# Patient Record
Sex: MALE | Race: WHITE | Employment: OTHER | URBAN - METROPOLITAN AREA
[De-identification: names, ages, dates, MRNs, and addresses within clinical notes are randomized per-mention and may not be internally consistent; named-entity substitution may affect disease eponyms.]

---

## 2017-01-05 ENCOUNTER — HOSPITAL ENCOUNTER (OUTPATIENT)
Dept: GENERAL RADIOLOGY | Age: 77
Discharge: HOME | End: 2017-01-05
Attending: INTERNAL MEDICINE | Admitting: INTERNAL MEDICINE

## 2017-01-05 ENCOUNTER — TELEPHONE (OUTPATIENT)
Dept: INTERNAL MEDICINE CLINIC | Age: 77
End: 2017-01-05

## 2017-01-05 DIAGNOSIS — S69.91XA INJURY OF RIGHT THUMB, INITIAL ENCOUNTER: Primary | ICD-10-CM

## 2017-01-05 DIAGNOSIS — S69.91XA INJURY OF RIGHT THUMB, INITIAL ENCOUNTER: ICD-10-CM

## 2017-01-06 ENCOUNTER — NURSE ONLY (OUTPATIENT)
Dept: INTERNAL MEDICINE CLINIC | Age: 77
End: 2017-01-06

## 2017-01-06 DIAGNOSIS — I48.20 CHRONIC ATRIAL FIBRILLATION (HCC): ICD-10-CM

## 2017-01-06 DIAGNOSIS — Z79.01 LONG TERM CURRENT USE OF ANTICOAGULANT THERAPY: ICD-10-CM

## 2017-01-06 LAB
INTERNATIONAL NORMALIZATION RATIO, POC: 2.3
PROTHROMBIN TIME, POC: NORMAL

## 2017-01-06 PROCEDURE — 85610 PROTHROMBIN TIME: CPT | Performed by: INTERNAL MEDICINE

## 2017-01-16 ENCOUNTER — TELEPHONE (OUTPATIENT)
Dept: INTERNAL MEDICINE CLINIC | Age: 77
End: 2017-01-16

## 2017-01-16 DIAGNOSIS — R25.1 TREMOR: Primary | ICD-10-CM

## 2017-01-23 ENCOUNTER — OFFICE VISIT (OUTPATIENT)
Dept: CARDIOLOGY CLINIC | Age: 77
End: 2017-01-23

## 2017-01-23 VITALS
BODY MASS INDEX: 32.35 KG/M2 | DIASTOLIC BLOOD PRESSURE: 78 MMHG | HEART RATE: 68 BPM | HEIGHT: 70 IN | SYSTOLIC BLOOD PRESSURE: 128 MMHG | WEIGHT: 226 LBS

## 2017-01-23 DIAGNOSIS — Z95.0 CARDIAC PACEMAKER IN SITU: ICD-10-CM

## 2017-01-23 DIAGNOSIS — E78.5 DYSLIPIDEMIA: Primary | ICD-10-CM

## 2017-01-23 PROCEDURE — 93279 PRGRMG DEV EVAL PM/LDLS PM: CPT | Performed by: INTERNAL MEDICINE

## 2017-01-23 PROCEDURE — 99213 OFFICE O/P EST LOW 20 MIN: CPT | Performed by: INTERNAL MEDICINE

## 2017-01-23 RX ORDER — EZETIMIBE 10 MG/1
10 TABLET ORAL DAILY
Qty: 30 TABLET | Refills: 11 | Status: SHIPPED | OUTPATIENT
Start: 2017-01-23 | End: 2017-11-08

## 2017-02-03 ENCOUNTER — NURSE ONLY (OUTPATIENT)
Dept: INTERNAL MEDICINE CLINIC | Age: 77
End: 2017-02-03

## 2017-02-03 VITALS — HEART RATE: 64 BPM | RESPIRATION RATE: 12 BRPM | DIASTOLIC BLOOD PRESSURE: 70 MMHG | SYSTOLIC BLOOD PRESSURE: 124 MMHG

## 2017-02-03 DIAGNOSIS — I48.20 CHRONIC ATRIAL FIBRILLATION (HCC): ICD-10-CM

## 2017-02-03 DIAGNOSIS — Z79.01 LONG TERM CURRENT USE OF ANTICOAGULANT THERAPY: ICD-10-CM

## 2017-02-03 LAB
INTERNATIONAL NORMALIZATION RATIO, POC: 2.3
PROTHROMBIN TIME, POC: NORMAL

## 2017-02-03 PROCEDURE — 85610 PROTHROMBIN TIME: CPT | Performed by: INTERNAL MEDICINE

## 2017-03-03 ENCOUNTER — NURSE ONLY (OUTPATIENT)
Dept: INTERNAL MEDICINE CLINIC | Age: 77
End: 2017-03-03

## 2017-03-03 DIAGNOSIS — Z79.01 LONG TERM CURRENT USE OF ANTICOAGULANT THERAPY: ICD-10-CM

## 2017-03-03 DIAGNOSIS — I48.20 CHRONIC ATRIAL FIBRILLATION (HCC): ICD-10-CM

## 2017-03-03 LAB
INTERNATIONAL NORMALIZATION RATIO, POC: 1.9
PROTHROMBIN TIME, POC: NORMAL

## 2017-03-03 PROCEDURE — 85610 PROTHROMBIN TIME: CPT | Performed by: INTERNAL MEDICINE

## 2017-03-13 RX ORDER — NIZATIDINE 150 MG/1
CAPSULE ORAL
Qty: 90 CAPSULE | Refills: 3 | Status: SHIPPED | OUTPATIENT
Start: 2017-03-13 | End: 2018-02-28 | Stop reason: SDUPTHER

## 2017-03-17 ENCOUNTER — NURSE ONLY (OUTPATIENT)
Dept: INTERNAL MEDICINE CLINIC | Age: 77
End: 2017-03-17

## 2017-03-17 VITALS — DIASTOLIC BLOOD PRESSURE: 64 MMHG | SYSTOLIC BLOOD PRESSURE: 122 MMHG

## 2017-03-17 DIAGNOSIS — I48.20 CHRONIC ATRIAL FIBRILLATION (HCC): ICD-10-CM

## 2017-03-17 DIAGNOSIS — Z79.01 LONG TERM CURRENT USE OF ANTICOAGULANT THERAPY: ICD-10-CM

## 2017-03-17 LAB
INTERNATIONAL NORMALIZATION RATIO, POC: 2.1
PROTHROMBIN TIME, POC: NORMAL

## 2017-03-17 PROCEDURE — 85610 PROTHROMBIN TIME: CPT | Performed by: INTERNAL MEDICINE

## 2017-03-22 ENCOUNTER — TELEPHONE (OUTPATIENT)
Dept: INTERNAL MEDICINE CLINIC | Age: 77
End: 2017-03-22

## 2017-03-23 ENCOUNTER — OFFICE VISIT (OUTPATIENT)
Dept: INTERNAL MEDICINE CLINIC | Age: 77
End: 2017-03-23

## 2017-03-23 VITALS
SYSTOLIC BLOOD PRESSURE: 120 MMHG | HEART RATE: 80 BPM | WEIGHT: 219 LBS | DIASTOLIC BLOOD PRESSURE: 70 MMHG | BODY MASS INDEX: 31.42 KG/M2

## 2017-03-23 DIAGNOSIS — I73.9 CLAUDICATION (HCC): ICD-10-CM

## 2017-03-23 DIAGNOSIS — I73.9 PERIPHERAL VASCULAR DISEASE (HCC): Primary | ICD-10-CM

## 2017-03-23 DIAGNOSIS — R73.02 GLUCOSE INTOLERANCE (IMPAIRED GLUCOSE TOLERANCE): ICD-10-CM

## 2017-03-23 DIAGNOSIS — E78.2 MIXED HYPERLIPIDEMIA: ICD-10-CM

## 2017-03-23 DIAGNOSIS — I10 ESSENTIAL HYPERTENSION: ICD-10-CM

## 2017-03-23 PROCEDURE — 99213 OFFICE O/P EST LOW 20 MIN: CPT | Performed by: INTERNAL MEDICINE

## 2017-03-28 ENCOUNTER — HOSPITAL ENCOUNTER (OUTPATIENT)
Dept: GENERAL RADIOLOGY | Age: 77
Discharge: HOME | End: 2017-03-28
Attending: INTERNAL MEDICINE | Admitting: INTERNAL MEDICINE

## 2017-03-28 LAB
ALBUMIN SERPL-MCNC: 4.6 GM/DL (ref 3.4–5)
ALP BLD-CCNC: 106 IU/L (ref 40–129)
ALT SERPL-CCNC: 14 U/L (ref 10–40)
ANION GAP SERPL CALCULATED.3IONS-SCNC: 13 MMOL/L (ref 4–16)
AST SERPL-CCNC: 15 IU/L (ref 15–37)
BILIRUB SERPL-MCNC: 0.6 MG/DL (ref 0–1)
BILIRUBIN DIRECT: 0.2 MG/DL (ref 0–0.3)
BILIRUBIN, INDIRECT: 0.4 MG/DL (ref 0–0.7)
BUN BLDV-MCNC: 19 MG/DL (ref 6–23)
CALCIUM SERPL-MCNC: 9.2 MG/DL (ref 8.3–10.6)
CHLORIDE BLD-SCNC: 103 MMOL/L (ref 99–110)
CHOLESTEROL: 215 MG/DL
CO2: 25 MMOL/L (ref 21–32)
CREAT SERPL-MCNC: 1.2 MG/DL (ref 0.9–1.3)
ESTIMATED AVERAGE GLUCOSE: 128 MG/DL
GFR AFRICAN AMERICAN: >60 ML/MIN/1.73M2
GFR NON-AFRICAN AMERICAN: 59 ML/MIN/1.73M2
GLUCOSE BLD-MCNC: 107 MG/DL (ref 70–140)
HBA1C MFR BLD: 6.1 % (ref 4.2–6.3)
HCT VFR BLD CALC: 49.6 % (ref 42–52)
HDLC SERPL-MCNC: 64 MG/DL
HEMOGLOBIN: 16 GM/DL (ref 13.5–18)
LDL CHOLESTEROL CALCULATED: 122 MG/DL
MCH RBC QN AUTO: 30.8 PG (ref 27–31)
MCHC RBC AUTO-ENTMCNC: 32.3 % (ref 32–36)
MCV RBC AUTO: 95.6 FL (ref 78–100)
PDW BLD-RTO: 14.1 % (ref 11.7–14.9)
PLATELET # BLD: 219 K/CU MM (ref 140–440)
PMV BLD AUTO: 10.9 FL (ref 7.5–11.1)
POTASSIUM SERPL-SCNC: 4.4 MMOL/L (ref 3.5–5.1)
RBC # BLD: 5.19 M/CU MM (ref 4.6–6.2)
SODIUM BLD-SCNC: 141 MMOL/L (ref 135–145)
TOTAL CK: 57 IU/L (ref 38–174)
TOTAL PROTEIN: 7.2 GM/DL (ref 6.4–8.2)
TRIGL SERPL-MCNC: 143 MG/DL
WBC # BLD: 8.9 K/CU MM (ref 4–10.5)

## 2017-04-05 ENCOUNTER — TELEPHONE (OUTPATIENT)
Dept: INTERNAL MEDICINE CLINIC | Age: 77
End: 2017-04-05

## 2017-04-06 ENCOUNTER — OFFICE VISIT (OUTPATIENT)
Dept: INTERNAL MEDICINE CLINIC | Age: 77
End: 2017-04-06

## 2017-04-06 VITALS
SYSTOLIC BLOOD PRESSURE: 120 MMHG | HEART RATE: 86 BPM | OXYGEN SATURATION: 93 % | RESPIRATION RATE: 20 BRPM | WEIGHT: 219.6 LBS | BODY MASS INDEX: 31.51 KG/M2 | DIASTOLIC BLOOD PRESSURE: 62 MMHG

## 2017-04-06 DIAGNOSIS — E78.2 MIXED HYPERLIPIDEMIA: ICD-10-CM

## 2017-04-06 DIAGNOSIS — I48.20 CHRONIC ATRIAL FIBRILLATION (HCC): ICD-10-CM

## 2017-04-06 DIAGNOSIS — R73.02 GLUCOSE INTOLERANCE (IMPAIRED GLUCOSE TOLERANCE): ICD-10-CM

## 2017-04-06 DIAGNOSIS — I10 ESSENTIAL HYPERTENSION: ICD-10-CM

## 2017-04-06 DIAGNOSIS — G20 PARKINSON DISEASE (HCC): Primary | ICD-10-CM

## 2017-04-06 PROCEDURE — 99213 OFFICE O/P EST LOW 20 MIN: CPT | Performed by: INTERNAL MEDICINE

## 2017-04-13 ENCOUNTER — NURSE ONLY (OUTPATIENT)
Dept: INTERNAL MEDICINE CLINIC | Age: 77
End: 2017-04-13

## 2017-04-13 DIAGNOSIS — Z79.01 LONG TERM CURRENT USE OF ANTICOAGULANT THERAPY: ICD-10-CM

## 2017-04-13 DIAGNOSIS — I48.20 CHRONIC ATRIAL FIBRILLATION (HCC): ICD-10-CM

## 2017-04-13 LAB
INTERNATIONAL NORMALIZATION RATIO, POC: 2.2
PROTHROMBIN TIME, POC: 26.6

## 2017-04-13 PROCEDURE — 85610 PROTHROMBIN TIME: CPT | Performed by: INTERNAL MEDICINE

## 2017-05-09 ENCOUNTER — TELEPHONE (OUTPATIENT)
Dept: INTERNAL MEDICINE CLINIC | Age: 77
End: 2017-05-09

## 2017-05-19 ENCOUNTER — TELEPHONE (OUTPATIENT)
Dept: INTERNAL MEDICINE CLINIC | Age: 77
End: 2017-05-19

## 2017-05-19 DIAGNOSIS — R73.02 GLUCOSE INTOLERANCE (IMPAIRED GLUCOSE TOLERANCE): ICD-10-CM

## 2017-05-19 DIAGNOSIS — Z79.899 ENCOUNTER FOR LONG-TERM (CURRENT) USE OF MEDICATIONS: ICD-10-CM

## 2017-05-19 DIAGNOSIS — I25.10 ASHD (ARTERIOSCLEROTIC HEART DISEASE): Primary | ICD-10-CM

## 2017-05-19 DIAGNOSIS — J43.9 PULMONARY EMPHYSEMA, UNSPECIFIED EMPHYSEMA TYPE (HCC): ICD-10-CM

## 2017-05-19 DIAGNOSIS — I48.0 PAROXYSMAL ATRIAL FIBRILLATION (HCC): ICD-10-CM

## 2017-05-19 DIAGNOSIS — I10 ESSENTIAL HYPERTENSION: ICD-10-CM

## 2017-05-19 DIAGNOSIS — Z51.81 ENCOUNTER FOR THERAPEUTIC DRUG MONITORING: ICD-10-CM

## 2017-05-19 DIAGNOSIS — E78.2 MIXED HYPERLIPIDEMIA: ICD-10-CM

## 2017-05-23 ENCOUNTER — HOSPITAL ENCOUNTER (OUTPATIENT)
Dept: GENERAL RADIOLOGY | Age: 77
Discharge: HOME | End: 2017-05-23
Attending: INTERNAL MEDICINE | Admitting: INTERNAL MEDICINE

## 2017-05-23 LAB
ALBUMIN SERPL-MCNC: 4.5 GM/DL (ref 3.4–5)
ALP BLD-CCNC: 114 IU/L (ref 40–129)
ALT SERPL-CCNC: 11 U/L (ref 10–40)
ANION GAP SERPL CALCULATED.3IONS-SCNC: 15 MMOL/L (ref 4–16)
AST SERPL-CCNC: 15 IU/L (ref 15–37)
BASOPHILS ABSOLUTE: 0.1 K/CU MM
BASOPHILS RELATIVE PERCENT: 0.6 % (ref 0–1)
BILIRUB SERPL-MCNC: 0.6 MG/DL (ref 0–1)
BILIRUBIN DIRECT: 0.2 MG/DL (ref 0–0.3)
BILIRUBIN, INDIRECT: 0.4 MG/DL (ref 0–0.7)
BUN BLDV-MCNC: 19 MG/DL (ref 6–23)
CALCIUM SERPL-MCNC: 9.3 MG/DL (ref 8.3–10.6)
CHLORIDE BLD-SCNC: 106 MMOL/L (ref 99–110)
CHOLESTEROL: 211 MG/DL
CO2: 24 MMOL/L (ref 21–32)
CREAT SERPL-MCNC: 1.1 MG/DL (ref 0.9–1.3)
DIFFERENTIAL TYPE: ABNORMAL
EOSINOPHILS ABSOLUTE: 0.1 K/CU MM
EOSINOPHILS RELATIVE PERCENT: 1.3 % (ref 0–3)
GFR AFRICAN AMERICAN: >60 ML/MIN/1.73M2
GFR NON-AFRICAN AMERICAN: >60 ML/MIN/1.73M2
GLUCOSE BLD-MCNC: 106 MG/DL (ref 70–140)
HCT VFR BLD CALC: 46.6 % (ref 42–52)
HDLC SERPL-MCNC: 58 MG/DL
HEMOGLOBIN: 15.1 GM/DL (ref 13.5–18)
IMMATURE NEUTROPHIL %: 0.3 % (ref 0–0.43)
INR BLD: 1.93 INDEX
LDL CHOLESTEROL DIRECT: 141 MG/DL
LYMPHOCYTES ABSOLUTE: 1.6 K/CU MM
LYMPHOCYTES RELATIVE PERCENT: 18.2 % (ref 24–44)
MCH RBC QN AUTO: 31.6 PG (ref 27–31)
MCHC RBC AUTO-ENTMCNC: 32.4 % (ref 32–36)
MCV RBC AUTO: 97.5 FL (ref 78–100)
MONOCYTES ABSOLUTE: 0.8 K/CU MM
MONOCYTES RELATIVE PERCENT: 8.8 % (ref 0–4)
NUCLEATED RBC %: 0 %
PDW BLD-RTO: 15.2 % (ref 11.7–14.9)
PLATELET # BLD: 251 K/CU MM (ref 140–440)
PMV BLD AUTO: 10.6 FL (ref 7.5–11.1)
POTASSIUM SERPL-SCNC: 5.1 MMOL/L (ref 3.5–5.1)
PROTHROMBIN TIME: 22.4 SECONDS (ref 9.12–12.5)
RBC # BLD: 4.78 M/CU MM (ref 4.6–6.2)
SEGMENTED NEUTROPHILS ABSOLUTE COUNT: 6.2 K/CU MM
SEGMENTED NEUTROPHILS RELATIVE PERCENT: 70.8 % (ref 36–66)
SODIUM BLD-SCNC: 145 MMOL/L (ref 135–145)
TOTAL CK: 58 IU/L (ref 38–174)
TOTAL IMMATURE NEUTOROPHIL: 0.03 K/CU MM
TOTAL NUCLEATED RBC: 0 K/CU MM
TOTAL PROTEIN: 7 GM/DL (ref 6.4–8.2)
TRIGL SERPL-MCNC: 112 MG/DL
TSH HIGH SENSITIVITY: 3.29 UIU/ML (ref 0.27–4.2)
WBC # BLD: 8.7 K/CU MM (ref 4–10.5)

## 2017-05-26 LAB
5 HIAA URINE (PER GM CREAT): 6 MG/GCR
5-HIAA 24 HOUR URINE: NORMAL
5-HIAA INTERPRETATION: NORMAL
5-HIAA URINE: 6.5
CREATININE URINE: 113
CREATININE, 24H UR: NORMAL
TIME URINE: NORMAL
VOLUME, (UVOL): NORMAL

## 2017-05-31 RX ORDER — CLOPIDOGREL BISULFATE 75 MG/1
75 TABLET ORAL DAILY
Qty: 90 TABLET | Refills: 3 | Status: ON HOLD | OUTPATIENT
Start: 2017-05-31 | End: 2017-11-17 | Stop reason: HOSPADM

## 2017-06-05 ENCOUNTER — TELEPHONE (OUTPATIENT)
Dept: INTERNAL MEDICINE CLINIC | Age: 77
End: 2017-06-05

## 2017-06-06 ENCOUNTER — OFFICE VISIT (OUTPATIENT)
Dept: INTERNAL MEDICINE CLINIC | Age: 77
End: 2017-06-06

## 2017-06-06 VITALS
WEIGHT: 216 LBS | HEART RATE: 68 BPM | BODY MASS INDEX: 30.99 KG/M2 | RESPIRATION RATE: 12 BRPM | DIASTOLIC BLOOD PRESSURE: 74 MMHG | SYSTOLIC BLOOD PRESSURE: 118 MMHG

## 2017-06-06 DIAGNOSIS — E78.2 MIXED HYPERLIPIDEMIA: ICD-10-CM

## 2017-06-06 DIAGNOSIS — I10 ESSENTIAL HYPERTENSION: ICD-10-CM

## 2017-06-06 DIAGNOSIS — J44.9 COPD, SEVERE (HCC): ICD-10-CM

## 2017-06-06 DIAGNOSIS — I25.10 ASHD (ARTERIOSCLEROTIC HEART DISEASE): Primary | ICD-10-CM

## 2017-06-06 DIAGNOSIS — F17.200 SMOKING: ICD-10-CM

## 2017-06-06 PROCEDURE — 99213 OFFICE O/P EST LOW 20 MIN: CPT | Performed by: INTERNAL MEDICINE

## 2017-06-06 ASSESSMENT — PATIENT HEALTH QUESTIONNAIRE - PHQ9
1. LITTLE INTEREST OR PLEASURE IN DOING THINGS: 0
2. FEELING DOWN, DEPRESSED OR HOPELESS: 0
SUM OF ALL RESPONSES TO PHQ9 QUESTIONS 1 & 2: 0
SUM OF ALL RESPONSES TO PHQ QUESTIONS 1-9: 0

## 2017-06-26 ENCOUNTER — NURSE ONLY (OUTPATIENT)
Dept: INTERNAL MEDICINE CLINIC | Age: 77
End: 2017-06-26

## 2017-06-26 DIAGNOSIS — Z79.01 LONG TERM CURRENT USE OF ANTICOAGULANT THERAPY: Primary | ICD-10-CM

## 2017-06-26 DIAGNOSIS — I48.20 CHRONIC ATRIAL FIBRILLATION (HCC): ICD-10-CM

## 2017-06-26 LAB
INTERNATIONAL NORMALIZATION RATIO, POC: 2.2
PROTHROMBIN TIME, POC: NORMAL

## 2017-06-26 PROCEDURE — 85610 PROTHROMBIN TIME: CPT | Performed by: INTERNAL MEDICINE

## 2017-06-30 RX ORDER — WARFARIN SODIUM 2 MG/1
TABLET ORAL
Qty: 220 TABLET | Refills: 3 | Status: SHIPPED | OUTPATIENT
Start: 2017-06-30 | End: 2019-12-05 | Stop reason: SDUPTHER

## 2017-07-10 RX ORDER — AMLODIPINE BESYLATE 10 MG/1
TABLET ORAL
Qty: 90 TABLET | Refills: 3 | Status: SHIPPED | OUTPATIENT
Start: 2017-07-10 | End: 2018-06-18 | Stop reason: SDUPTHER

## 2017-07-19 ENCOUNTER — NURSE ONLY (OUTPATIENT)
Dept: CARDIOLOGY CLINIC | Age: 77
End: 2017-07-19

## 2017-07-19 VITALS — SYSTOLIC BLOOD PRESSURE: 136 MMHG | DIASTOLIC BLOOD PRESSURE: 86 MMHG | HEART RATE: 62 BPM

## 2017-07-19 DIAGNOSIS — Z95.0 CARDIAC PACEMAKER IN SITU: Primary | ICD-10-CM

## 2017-07-19 PROCEDURE — 93279 PRGRMG DEV EVAL PM/LDLS PM: CPT | Performed by: INTERNAL MEDICINE

## 2017-07-24 ENCOUNTER — NURSE ONLY (OUTPATIENT)
Dept: INTERNAL MEDICINE CLINIC | Age: 77
End: 2017-07-24

## 2017-07-24 VITALS — SYSTOLIC BLOOD PRESSURE: 130 MMHG | DIASTOLIC BLOOD PRESSURE: 70 MMHG | RESPIRATION RATE: 14 BRPM | HEART RATE: 68 BPM

## 2017-07-24 DIAGNOSIS — Z79.01 LONG TERM CURRENT USE OF ANTICOAGULANT THERAPY: ICD-10-CM

## 2017-07-24 DIAGNOSIS — J44.1 COPD EXACERBATION (HCC): ICD-10-CM

## 2017-07-24 DIAGNOSIS — I48.20 CHRONIC ATRIAL FIBRILLATION (HCC): ICD-10-CM

## 2017-07-24 DIAGNOSIS — R42 DIZZINESS: ICD-10-CM

## 2017-07-24 LAB
INTERNATIONAL NORMALIZATION RATIO, POC: 2.3
PROTHROMBIN TIME, POC: NORMAL

## 2017-07-24 PROCEDURE — 85610 PROTHROMBIN TIME: CPT | Performed by: INTERNAL MEDICINE

## 2017-08-08 RX ORDER — TAMSULOSIN HYDROCHLORIDE 0.4 MG/1
CAPSULE ORAL
Qty: 90 CAPSULE | Refills: 3 | Status: SHIPPED | OUTPATIENT
Start: 2017-08-08 | End: 2017-11-08

## 2017-08-15 ENCOUNTER — TELEPHONE (OUTPATIENT)
Dept: INTERNAL MEDICINE CLINIC | Age: 77
End: 2017-08-15

## 2017-08-15 DIAGNOSIS — E78.2 MIXED HYPERLIPIDEMIA: ICD-10-CM

## 2017-08-15 DIAGNOSIS — Z79.01 LONG TERM CURRENT USE OF ANTICOAGULANT THERAPY: ICD-10-CM

## 2017-08-15 DIAGNOSIS — I10 ESSENTIAL HYPERTENSION: Primary | ICD-10-CM

## 2017-08-15 DIAGNOSIS — E03.4 HYPOTHYROIDISM DUE TO ACQUIRED ATROPHY OF THYROID: ICD-10-CM

## 2017-08-15 DIAGNOSIS — E11.69 TYPE 2 DIABETES MELLITUS WITH OTHER SPECIFIED COMPLICATION (HCC): ICD-10-CM

## 2017-08-15 LAB
BUN BLDV-MCNC: 18 MG/DL
CALCIUM SERPL-MCNC: 9.9 MG/DL
CHLORIDE BLD-SCNC: 103 MMOL/L
CO2: 25 MMOL/L
CREAT SERPL-MCNC: 1.1 MG/DL
GFR CALCULATED: 64
GLUCOSE BLD-MCNC: 102 MG/DL
POTASSIUM SERPL-SCNC: 4.8 MMOL/L
PROSTATE SPECIFIC ANTIGEN: 6.29 NG/ML
SODIUM BLD-SCNC: 145 MMOL/L

## 2017-08-17 ENCOUNTER — ANTI-COAG VISIT (OUTPATIENT)
Dept: INTERNAL MEDICINE CLINIC | Age: 77
End: 2017-08-17

## 2017-08-17 ENCOUNTER — NURSE ONLY (OUTPATIENT)
Dept: INTERNAL MEDICINE CLINIC | Age: 77
End: 2017-08-17

## 2017-08-17 DIAGNOSIS — E11.69 TYPE 2 DIABETES MELLITUS WITH OTHER SPECIFIED COMPLICATION (HCC): ICD-10-CM

## 2017-08-17 DIAGNOSIS — Z79.01 LONG TERM CURRENT USE OF ANTICOAGULANT THERAPY: ICD-10-CM

## 2017-08-17 DIAGNOSIS — I10 ESSENTIAL HYPERTENSION: ICD-10-CM

## 2017-08-17 DIAGNOSIS — E78.2 MIXED HYPERLIPIDEMIA: ICD-10-CM

## 2017-08-17 DIAGNOSIS — E03.4 HYPOTHYROIDISM DUE TO ACQUIRED ATROPHY OF THYROID: ICD-10-CM

## 2017-08-17 LAB
ALBUMIN SERPL-MCNC: 4.5 G/DL (ref 3.4–5)
ALP BLD-CCNC: 104 U/L (ref 40–129)
ALT SERPL-CCNC: 10 U/L (ref 10–40)
ANION GAP SERPL CALCULATED.3IONS-SCNC: 17 MMOL/L (ref 3–16)
AST SERPL-CCNC: 16 U/L (ref 15–37)
BASOPHILS ABSOLUTE: 0.1 K/UL (ref 0–0.2)
BASOPHILS RELATIVE PERCENT: 0.7 %
BILIRUB SERPL-MCNC: 0.4 MG/DL (ref 0–1)
BILIRUBIN DIRECT: <0.2 MG/DL (ref 0–0.3)
BILIRUBIN, INDIRECT: NORMAL MG/DL (ref 0–1)
BUN BLDV-MCNC: 22 MG/DL (ref 7–20)
CALCIUM SERPL-MCNC: 9.4 MG/DL (ref 8.3–10.6)
CHLORIDE BLD-SCNC: 102 MMOL/L (ref 99–110)
CO2: 24 MMOL/L (ref 21–32)
CREAT SERPL-MCNC: 1 MG/DL (ref 0.8–1.3)
EOSINOPHILS ABSOLUTE: 0.1 K/UL (ref 0–0.6)
EOSINOPHILS RELATIVE PERCENT: 1.6 %
ESTIMATED AVERAGE GLUCOSE: 128.4 MG/DL
GFR AFRICAN AMERICAN: >60
GFR NON-AFRICAN AMERICAN: >60
GLUCOSE BLD-MCNC: 89 MG/DL (ref 70–99)
HBA1C MFR BLD: 6.1 %
HCT VFR BLD CALC: 49.3 % (ref 40.5–52.5)
HEMOGLOBIN: 16.3 G/DL (ref 13.5–17.5)
INR BLD: 1.65 (ref 0.85–1.15)
LYMPHOCYTES ABSOLUTE: 1.8 K/UL (ref 1–5.1)
LYMPHOCYTES RELATIVE PERCENT: 22.9 %
MCH RBC QN AUTO: 31.8 PG (ref 26–34)
MCHC RBC AUTO-ENTMCNC: 33.1 G/DL (ref 31–36)
MCV RBC AUTO: 96.1 FL (ref 80–100)
MONOCYTES ABSOLUTE: 0.8 K/UL (ref 0–1.3)
MONOCYTES RELATIVE PERCENT: 10.4 %
NEUTROPHILS ABSOLUTE: 5.1 K/UL (ref 1.7–7.7)
NEUTROPHILS RELATIVE PERCENT: 64.4 %
PDW BLD-RTO: 14.2 % (ref 12.4–15.4)
PLATELET # BLD: 203 K/UL (ref 135–450)
PMV BLD AUTO: 9.4 FL (ref 5–10.5)
POTASSIUM SERPL-SCNC: 4.9 MMOL/L (ref 3.5–5.1)
PROTHROMBIN TIME: 18.7 SEC (ref 9.6–13)
RBC # BLD: 5.12 M/UL (ref 4.2–5.9)
SODIUM BLD-SCNC: 143 MMOL/L (ref 136–145)
TOTAL CK: 47 U/L (ref 39–308)
TOTAL PROTEIN: 6.9 G/DL (ref 6.4–8.2)
WBC # BLD: 7.9 K/UL (ref 4–11)

## 2017-08-17 PROCEDURE — 36415 COLL VENOUS BLD VENIPUNCTURE: CPT | Performed by: INTERNAL MEDICINE

## 2017-08-23 ENCOUNTER — HOSPITAL ENCOUNTER (OUTPATIENT)
Dept: CT IMAGING | Age: 77
Discharge: HOME | End: 2017-08-23
Attending: UROLOGY | Admitting: UROLOGY

## 2017-08-23 DIAGNOSIS — N20.0 CALCULUS OF KIDNEY: ICD-10-CM

## 2017-08-29 ENCOUNTER — TELEPHONE (OUTPATIENT)
Dept: INTERNAL MEDICINE CLINIC | Age: 77
End: 2017-08-29

## 2017-08-30 ENCOUNTER — NURSE ONLY (OUTPATIENT)
Dept: INTERNAL MEDICINE CLINIC | Age: 77
End: 2017-08-30

## 2017-08-30 DIAGNOSIS — I48.20 CHRONIC ATRIAL FIBRILLATION (HCC): ICD-10-CM

## 2017-08-30 DIAGNOSIS — Z79.01 LONG TERM CURRENT USE OF ANTICOAGULANT THERAPY: ICD-10-CM

## 2017-08-30 LAB
INTERNATIONAL NORMALIZATION RATIO, POC: 2.6
PROTHROMBIN TIME, POC: NORMAL

## 2017-08-30 PROCEDURE — 85610 PROTHROMBIN TIME: CPT | Performed by: INTERNAL MEDICINE

## 2017-09-06 ENCOUNTER — TELEPHONE (OUTPATIENT)
Dept: INTERNAL MEDICINE CLINIC | Age: 77
End: 2017-09-06

## 2017-09-07 ENCOUNTER — OFFICE VISIT (OUTPATIENT)
Dept: INTERNAL MEDICINE CLINIC | Age: 77
End: 2017-09-07

## 2017-09-07 VITALS
DIASTOLIC BLOOD PRESSURE: 74 MMHG | RESPIRATION RATE: 16 BRPM | HEART RATE: 90 BPM | WEIGHT: 215 LBS | OXYGEN SATURATION: 96 % | BODY MASS INDEX: 30.85 KG/M2 | SYSTOLIC BLOOD PRESSURE: 126 MMHG

## 2017-09-07 DIAGNOSIS — I10 ESSENTIAL HYPERTENSION: ICD-10-CM

## 2017-09-07 DIAGNOSIS — K76.89 LIVER CYST: ICD-10-CM

## 2017-09-07 DIAGNOSIS — I48.20 CHRONIC ATRIAL FIBRILLATION (HCC): Primary | ICD-10-CM

## 2017-09-07 DIAGNOSIS — E11.69 TYPE 2 DIABETES MELLITUS WITH OTHER SPECIFIED COMPLICATION (HCC): ICD-10-CM

## 2017-09-07 DIAGNOSIS — J44.9 COPD, SEVERE (HCC): ICD-10-CM

## 2017-09-07 PROCEDURE — 99213 OFFICE O/P EST LOW 20 MIN: CPT | Performed by: INTERNAL MEDICINE

## 2017-09-27 ENCOUNTER — NURSE ONLY (OUTPATIENT)
Dept: INTERNAL MEDICINE CLINIC | Age: 77
End: 2017-09-27

## 2017-09-27 DIAGNOSIS — I48.20 CHRONIC ATRIAL FIBRILLATION (HCC): ICD-10-CM

## 2017-09-27 DIAGNOSIS — Z79.01 LONG TERM CURRENT USE OF ANTICOAGULANT THERAPY: ICD-10-CM

## 2017-09-27 LAB
INTERNATIONAL NORMALIZATION RATIO, POC: 2.2
PROTHROMBIN TIME, POC: NORMAL

## 2017-09-27 PROCEDURE — 85610 PROTHROMBIN TIME: CPT | Performed by: INTERNAL MEDICINE

## 2017-10-25 ENCOUNTER — PROCEDURE VISIT (OUTPATIENT)
Dept: CARDIOLOGY CLINIC | Age: 77
End: 2017-10-25

## 2017-10-25 VITALS — DIASTOLIC BLOOD PRESSURE: 74 MMHG | HEART RATE: 66 BPM | SYSTOLIC BLOOD PRESSURE: 132 MMHG

## 2017-10-25 DIAGNOSIS — Z95.0 CARDIAC PACEMAKER IN SITU: Primary | ICD-10-CM

## 2017-10-25 PROCEDURE — 93279 PRGRMG DEV EVAL PM/LDLS PM: CPT | Performed by: INTERNAL MEDICINE

## 2017-10-25 NOTE — PROCEDURES
Daleen Sicard Dillahunt  76 y.o., male  1940    Christian Bates MD    Chief Complaint   Patient presents with    Procedure     Pacemaker check     Vitals:    10/25/17 0902   BP: 132/74   Pulse: 66     Pacer analysis is reviewed and filed in the pacer chart. Analysis is consistent with normal Single-chamber Medtronic pacer function with stable leads and appropriate battery status for the age of the device. Remaining battery is 4 years. Patient is  pacing 100% of the time. Recommend continued every three month pacer check and follow up office visit as scheduled.     Ayesha Rivera MD, 10/25/2017 3:22 PM

## 2017-10-31 ENCOUNTER — TELEPHONE (OUTPATIENT)
Dept: CARDIOLOGY CLINIC | Age: 77
End: 2017-10-31

## 2017-11-06 ENCOUNTER — OFFICE VISIT (OUTPATIENT)
Dept: INTERNAL MEDICINE CLINIC | Age: 77
End: 2017-11-06

## 2017-11-06 VITALS
WEIGHT: 217 LBS | HEIGHT: 70 IN | SYSTOLIC BLOOD PRESSURE: 132 MMHG | HEART RATE: 72 BPM | DIASTOLIC BLOOD PRESSURE: 80 MMHG | BODY MASS INDEX: 31.07 KG/M2

## 2017-11-06 DIAGNOSIS — Z23 NEEDS FLU SHOT: ICD-10-CM

## 2017-11-06 DIAGNOSIS — I10 ESSENTIAL HYPERTENSION: ICD-10-CM

## 2017-11-06 DIAGNOSIS — R73.02 GLUCOSE INTOLERANCE (IMPAIRED GLUCOSE TOLERANCE): ICD-10-CM

## 2017-11-06 DIAGNOSIS — Z79.01 LONG TERM CURRENT USE OF ANTICOAGULANT THERAPY: ICD-10-CM

## 2017-11-06 DIAGNOSIS — I48.20 CHRONIC ATRIAL FIBRILLATION (HCC): Primary | ICD-10-CM

## 2017-11-06 DIAGNOSIS — E78.2 MIXED HYPERLIPIDEMIA: ICD-10-CM

## 2017-11-06 LAB
INTERNATIONAL NORMALIZATION RATIO, POC: 1.8
PROTHROMBIN TIME, POC: ABNORMAL

## 2017-11-06 PROCEDURE — 85610 PROTHROMBIN TIME: CPT | Performed by: INTERNAL MEDICINE

## 2017-11-06 PROCEDURE — G0008 ADMIN INFLUENZA VIRUS VAC: HCPCS | Performed by: INTERNAL MEDICINE

## 2017-11-06 PROCEDURE — 99213 OFFICE O/P EST LOW 20 MIN: CPT | Performed by: INTERNAL MEDICINE

## 2017-11-06 PROCEDURE — 90662 IIV NO PRSV INCREASED AG IM: CPT | Performed by: INTERNAL MEDICINE

## 2017-11-06 RX ORDER — HYDROCODONE BITARTRATE AND ACETAMINOPHEN 5; 325 MG/1; MG/1
TABLET ORAL
Refills: 0 | COMMUNITY
Start: 2017-09-05 | End: 2017-11-06

## 2017-11-07 LAB
ALBUMIN SERPL-MCNC: 4.1 G/DL (ref 3.4–5)
ALP BLD-CCNC: 112 U/L (ref 40–129)
ALT SERPL-CCNC: 11 U/L (ref 10–40)
ANION GAP SERPL CALCULATED.3IONS-SCNC: 15 MMOL/L (ref 3–16)
AST SERPL-CCNC: 15 U/L (ref 15–37)
BILIRUB SERPL-MCNC: 0.4 MG/DL (ref 0–1)
BILIRUBIN DIRECT: <0.2 MG/DL (ref 0–0.3)
BILIRUBIN, INDIRECT: NORMAL MG/DL (ref 0–1)
BUN BLDV-MCNC: 22 MG/DL (ref 7–20)
CALCIUM SERPL-MCNC: 9.5 MG/DL (ref 8.3–10.6)
CHLORIDE BLD-SCNC: 105 MMOL/L (ref 99–110)
CHOLESTEROL, TOTAL: 209 MG/DL (ref 0–199)
CO2: 25 MMOL/L (ref 21–32)
CREAT SERPL-MCNC: 1.1 MG/DL (ref 0.8–1.3)
GFR AFRICAN AMERICAN: >60
GFR NON-AFRICAN AMERICAN: >60
GLUCOSE BLD-MCNC: 103 MG/DL (ref 70–99)
HCT VFR BLD CALC: 45.3 % (ref 40.5–52.5)
HDLC SERPL-MCNC: 51 MG/DL (ref 40–60)
HEMOGLOBIN: 15.2 G/DL (ref 13.5–17.5)
LDL CHOLESTEROL CALCULATED: 126 MG/DL
MCH RBC QN AUTO: 32.5 PG (ref 26–34)
MCHC RBC AUTO-ENTMCNC: 33.6 G/DL (ref 31–36)
MCV RBC AUTO: 96.7 FL (ref 80–100)
PDW BLD-RTO: 14.3 % (ref 12.4–15.4)
PLATELET # BLD: 203 K/UL (ref 135–450)
PMV BLD AUTO: 9.8 FL (ref 5–10.5)
POTASSIUM SERPL-SCNC: 4.8 MMOL/L (ref 3.5–5.1)
RBC # BLD: 4.69 M/UL (ref 4.2–5.9)
SODIUM BLD-SCNC: 145 MMOL/L (ref 136–145)
TOTAL CK: 42 U/L (ref 39–308)
TOTAL PROTEIN: 6.9 G/DL (ref 6.4–8.2)
TRIGL SERPL-MCNC: 162 MG/DL (ref 0–150)
VLDLC SERPL CALC-MCNC: 32 MG/DL
WBC # BLD: 9.2 K/UL (ref 4–11)

## 2017-11-08 VITALS — WEIGHT: 212 LBS | HEIGHT: 70 IN | BODY MASS INDEX: 30.35 KG/M2

## 2017-11-08 LAB
ESTIMATED AVERAGE GLUCOSE: 134.1 MG/DL
HBA1C MFR BLD: 6.3 %

## 2017-11-08 RX ORDER — CIPROFLOXACIN 2 MG/ML
400 INJECTION, SOLUTION INTRAVENOUS ONCE
Status: CANCELLED | OUTPATIENT
Start: 2017-11-10

## 2017-11-08 NOTE — PRE-PROCEDURE INSTRUCTIONS
Surgery:        11/10/2017@ 1445                  Arrival time: 1245  Nothing to eat or drink after midnight unless instructed to take certain medications by the doctor or the nurse the am of surgery  Arrive at the front information desk -1st floor /[facility] [place] is on [address] [facility]  Please leave money and all other valuables at home. Wear comfortable clothing. If you wear contacts please bring a case. No make up. You may be asked to remove rings or body piercing. Please bring insurance cards and picture ID am of procedure. Please bring any consent or paper work from your doctor. If you become ill,such as a cold, sore throat or fever contact your doctor. Please bathe or shower am of procedure.   Medications to take AM of procedure:  As directed by Dr Fede Enrique   Any questions call [facility] [place]  [phone]

## 2017-11-09 ENCOUNTER — HOSPITAL ENCOUNTER (OUTPATIENT)
Dept: PREADMISSION TESTING | Age: 77
Discharge: HOME | End: 2017-12-09
Attending: UROLOGY | Admitting: UROLOGY

## 2017-11-09 RX ORDER — SODIUM CHLORIDE, SODIUM LACTATE, POTASSIUM CHLORIDE, CALCIUM CHLORIDE 600; 310; 30; 20 MG/100ML; MG/100ML; MG/100ML; MG/100ML
INJECTION, SOLUTION INTRAVENOUS CONTINUOUS
Status: CANCELLED | OUTPATIENT
Start: 2017-11-09

## 2017-11-09 NOTE — ANESTHESIA PRE-OP
S/P AV shannan ablation-done 9-0889 Z98.890    Presence of permanent cardiac pacemaker Z95.0    S/P AV shannan ablation-done 4-1999 Z98.890    Long term current use of anticoagulant therapy Z79.01    Unstable angina (HCC) I20.0    Moderate COPD (chronic obstructive pulmonary disease) ([facility]) J44.9       Past Medical History:        Diagnosis Date    Acid reflux     Adenomatous polyp of colon 1-2009    last colonoscopy recomm 3 yr follow up    Arthritis     \"arthritis in low back\"    CAD (coronary artery disease)     follow with dr Ellyn Ayala- cardiac clearance for surgery done 10/31/2017 on chart)    Chronic atrial fibrillation ([facility])     Sees Dr. Mario Madison COPD (chronic obstructive pulmonary disease) ([facility] [address]     follows with dr Ashanti Hammond Diverticulitis     Diverticulosis of colon     left colon    Enlarged prostate     \"had to ream me out a couple of times\":   [facility] H/O cardiac catheterization 1/31/2006    R codominant, RCA patent, LAD patent with minimal nonobstructive irreg, CIRC patent and codominant, RI small but patent    H/O cardiovascular stress test 5/18/10    EF81% decrease in perfusion in inferoseptal segments c/w paced rhythm, LV small in volume    H/O cardiovascular stress test 5/5/2016    lexiscan-normal,EF70%    H/O cardiovascular stress test 6/2/2016    treadmill    H/O Doppler ultrasound 06/15/2016    minimal plaque throughout bilaterally    H/O echocardiogram 5/3/16    EF 50-55%. Mild concentric LV hypertrophy with low normal systolic function. Mod bilateral atrial and right ventricle dilatation. Mild AR, MR, TR, MT. Absence of pericardial effsion.     H/O echocardiogram 8/4/11    EF(not given in faxed records) mild MR, mod LAE, mild AR    H/O percutaneous left heart catheterization 5/12/16    left circumflex is 60% very distal stenosis at the bifurcation, OM1 is 99% mid segment stenosis    H/O unstable angina     History of Doppler ultrasound 3/29/12    Abd Ao Duplex - no abdominal aoritic

## 2017-11-10 ENCOUNTER — HOSPITAL ENCOUNTER (OUTPATIENT)
Dept: SURGERY | Age: 77
Discharge: HOME | End: 2017-11-10
Attending: UROLOGY | Admitting: UROLOGY

## 2017-11-10 LAB
ALBUMIN SERPL-MCNC: 4.6 G/DL
ALP BLD-CCNC: 113 U/L
ALT SERPL-CCNC: 11 U/L
ANION GAP SERPL CALCULATED.3IONS-SCNC: NORMAL MMOL/L
AST SERPL-CCNC: 19 U/L
BASOPHILS ABSOLUTE: NORMAL /ΜL
BASOPHILS RELATIVE PERCENT: NORMAL %
BILIRUB SERPL-MCNC: 0.3 MG/DL (ref 0.1–1.4)
BUN BLDV-MCNC: 22 MG/DL
CALCIUM SERPL-MCNC: 9.3 MG/DL
CHLORIDE BLD-SCNC: 102 MMOL/L
CO2: 20 MMOL/L
CREAT SERPL-MCNC: 1.1 MG/DL
EOSINOPHILS ABSOLUTE: NORMAL /ΜL
EOSINOPHILS RELATIVE PERCENT: NORMAL %
GFR CALCULATED: 64
GLUCOSE BLD-MCNC: 103 MG/DL
HCT VFR BLD CALC: 44.6 % (ref 41–53)
HEMOGLOBIN: 15.1 G/DL (ref 13.5–17.5)
INR BLD: 1.4
LYMPHOCYTES ABSOLUTE: NORMAL /ΜL
LYMPHOCYTES RELATIVE PERCENT: NORMAL %
MCH RBC QN AUTO: 32.1 PG
MCHC RBC AUTO-ENTMCNC: 33.8 G/DL
MCV RBC AUTO: 95 FL
MONOCYTES ABSOLUTE: NORMAL /ΜL
MONOCYTES RELATIVE PERCENT: NORMAL %
NEUTROPHILS ABSOLUTE: NORMAL /ΜL
NEUTROPHILS RELATIVE PERCENT: NORMAL %
PLATELET # BLD: 234 K/ΜL
PMV BLD AUTO: NORMAL FL
POTASSIUM SERPL-SCNC: 4.6 MMOL/L
PROTIME: 14.3 SECONDS
RBC # BLD: 4.69 10^6/ΜL
SODIUM BLD-SCNC: 143 MMOL/L
TOTAL PROTEIN: 6.8
WBC # BLD: 8.7 10^3/ML

## 2017-11-17 PROBLEM — R31.0 GROSS HEMATURIA: Status: ACTIVE | Noted: 2017-11-17

## 2017-11-17 PROBLEM — N20.1 URETERAL STONE: Status: ACTIVE | Noted: 2017-11-17

## 2017-11-21 ENCOUNTER — TELEPHONE (OUTPATIENT)
Dept: INTERNAL MEDICINE CLINIC | Age: 77
End: 2017-11-21

## 2017-11-21 DIAGNOSIS — E03.4 HYPOTHYROIDISM DUE TO ACQUIRED ATROPHY OF THYROID: ICD-10-CM

## 2017-11-21 DIAGNOSIS — I10 ESSENTIAL HYPERTENSION: ICD-10-CM

## 2017-11-21 DIAGNOSIS — E78.2 MIXED HYPERLIPIDEMIA: ICD-10-CM

## 2017-11-21 DIAGNOSIS — E11.69 TYPE 2 DIABETES MELLITUS WITH OTHER SPECIFIED COMPLICATION, UNSPECIFIED LONG TERM INSULIN USE STATUS: Primary | ICD-10-CM

## 2017-11-27 ENCOUNTER — NURSE ONLY (OUTPATIENT)
Dept: INTERNAL MEDICINE CLINIC | Age: 77
End: 2017-11-27

## 2017-11-27 DIAGNOSIS — Z79.01 LONG TERM CURRENT USE OF ANTICOAGULANT THERAPY: ICD-10-CM

## 2017-11-27 DIAGNOSIS — I48.20 CHRONIC ATRIAL FIBRILLATION (HCC): ICD-10-CM

## 2017-11-27 LAB
INTERNATIONAL NORMALIZATION RATIO, POC: 1.2
PROTHROMBIN TIME, POC: ABNORMAL

## 2017-11-27 PROCEDURE — 85610 PROTHROMBIN TIME: CPT | Performed by: INTERNAL MEDICINE

## 2017-11-27 NOTE — PROGRESS NOTES
Radha Elizalde stated that he had been off of his Coumadin due to a procedure and started it back up at this regular dose on Tues.

## 2017-12-01 ENCOUNTER — NURSE ONLY (OUTPATIENT)
Dept: INTERNAL MEDICINE CLINIC | Age: 77
End: 2017-12-01

## 2017-12-01 DIAGNOSIS — Z79.01 LONG TERM CURRENT USE OF ANTICOAGULANT THERAPY: ICD-10-CM

## 2017-12-01 DIAGNOSIS — I48.20 CHRONIC ATRIAL FIBRILLATION (HCC): ICD-10-CM

## 2017-12-01 LAB
INTERNATIONAL NORMALIZATION RATIO, POC: 1.6
PROTHROMBIN TIME, POC: ABNORMAL

## 2017-12-01 PROCEDURE — 85610 PROTHROMBIN TIME: CPT | Performed by: INTERNAL MEDICINE

## 2017-12-01 NOTE — PROGRESS NOTES
Message was left for mario to call the office and make a 2 week follow up and to keep his Coumadin the same dose.

## 2017-12-07 ENCOUNTER — OFFICE VISIT (OUTPATIENT)
Dept: INTERNAL MEDICINE CLINIC | Age: 77
End: 2017-12-07

## 2017-12-07 VITALS
BODY MASS INDEX: 30.39 KG/M2 | WEIGHT: 211.8 LBS | SYSTOLIC BLOOD PRESSURE: 120 MMHG | HEART RATE: 80 BPM | DIASTOLIC BLOOD PRESSURE: 68 MMHG

## 2017-12-07 DIAGNOSIS — I10 ESSENTIAL HYPERTENSION: ICD-10-CM

## 2017-12-07 DIAGNOSIS — J44.9 COPD, SEVERE (HCC): ICD-10-CM

## 2017-12-07 DIAGNOSIS — I48.20 CHRONIC ATRIAL FIBRILLATION (HCC): Primary | ICD-10-CM

## 2017-12-07 DIAGNOSIS — E11.69 TYPE 2 DIABETES MELLITUS WITH OTHER SPECIFIED COMPLICATION, UNSPECIFIED LONG TERM INSULIN USE STATUS: ICD-10-CM

## 2017-12-07 DIAGNOSIS — E78.2 MIXED HYPERLIPIDEMIA: ICD-10-CM

## 2017-12-07 PROCEDURE — 99213 OFFICE O/P EST LOW 20 MIN: CPT | Performed by: INTERNAL MEDICINE

## 2017-12-21 ENCOUNTER — NURSE ONLY (OUTPATIENT)
Dept: INTERNAL MEDICINE CLINIC | Age: 77
End: 2017-12-21

## 2017-12-21 DIAGNOSIS — I48.20 CHRONIC ATRIAL FIBRILLATION (HCC): ICD-10-CM

## 2017-12-21 DIAGNOSIS — Z79.01 LONG TERM CURRENT USE OF ANTICOAGULANT THERAPY: ICD-10-CM

## 2017-12-21 LAB
INTERNATIONAL NORMALIZATION RATIO, POC: 3
PROTHROMBIN TIME, POC: NORMAL

## 2017-12-21 PROCEDURE — 85610 PROTHROMBIN TIME: CPT | Performed by: INTERNAL MEDICINE

## 2018-01-07 DIAGNOSIS — E78.5 DYSLIPIDEMIA: ICD-10-CM

## 2018-01-11 ENCOUNTER — NURSE ONLY (OUTPATIENT)
Dept: INTERNAL MEDICINE CLINIC | Age: 78
End: 2018-01-11

## 2018-01-11 DIAGNOSIS — Z79.01 LONG TERM CURRENT USE OF ANTICOAGULANT THERAPY: ICD-10-CM

## 2018-01-11 DIAGNOSIS — I48.20 CHRONIC ATRIAL FIBRILLATION (HCC): ICD-10-CM

## 2018-01-11 LAB
INTERNATIONAL NORMALIZATION RATIO, POC: 2
PROTHROMBIN TIME, POC: NORMAL

## 2018-01-11 PROCEDURE — 85610 PROTHROMBIN TIME: CPT | Performed by: INTERNAL MEDICINE

## 2018-01-23 ENCOUNTER — TELEPHONE (OUTPATIENT)
Dept: INTERNAL MEDICINE CLINIC | Age: 78
End: 2018-01-23

## 2018-01-23 DIAGNOSIS — E11.69 TYPE 2 DIABETES MELLITUS WITH OTHER SPECIFIED COMPLICATION, UNSPECIFIED LONG TERM INSULIN USE STATUS: ICD-10-CM

## 2018-01-23 DIAGNOSIS — Z79.01 LONG TERM CURRENT USE OF ANTICOAGULANT THERAPY: Primary | ICD-10-CM

## 2018-01-23 DIAGNOSIS — I10 ESSENTIAL HYPERTENSION: ICD-10-CM

## 2018-01-23 DIAGNOSIS — E78.2 MIXED HYPERLIPIDEMIA: ICD-10-CM

## 2018-01-29 DIAGNOSIS — E11.69 TYPE 2 DIABETES MELLITUS WITH OTHER SPECIFIED COMPLICATION, UNSPECIFIED LONG TERM INSULIN USE STATUS: ICD-10-CM

## 2018-01-29 DIAGNOSIS — E78.2 MIXED HYPERLIPIDEMIA: ICD-10-CM

## 2018-01-29 DIAGNOSIS — I10 ESSENTIAL HYPERTENSION: ICD-10-CM

## 2018-01-29 LAB
ALBUMIN SERPL-MCNC: 4.7 G/DL (ref 3.4–5)
ALP BLD-CCNC: 121 U/L (ref 40–129)
ALT SERPL-CCNC: 10 U/L (ref 10–40)
ANION GAP SERPL CALCULATED.3IONS-SCNC: 19 MMOL/L (ref 3–16)
AST SERPL-CCNC: 14 U/L (ref 15–37)
BASOPHILS ABSOLUTE: 0.1 K/UL (ref 0–0.2)
BASOPHILS RELATIVE PERCENT: 0.7 %
BILIRUB SERPL-MCNC: 0.5 MG/DL (ref 0–1)
BILIRUBIN DIRECT: <0.2 MG/DL (ref 0–0.3)
BILIRUBIN, INDIRECT: ABNORMAL MG/DL (ref 0–1)
BUN BLDV-MCNC: 25 MG/DL (ref 7–20)
CALCIUM SERPL-MCNC: 9.4 MG/DL (ref 8.3–10.6)
CHLORIDE BLD-SCNC: 106 MMOL/L (ref 99–110)
CHOLESTEROL, TOTAL: 215 MG/DL (ref 0–199)
CO2: 22 MMOL/L (ref 21–32)
CREAT SERPL-MCNC: 1.1 MG/DL (ref 0.8–1.3)
EOSINOPHILS ABSOLUTE: 0.1 K/UL (ref 0–0.6)
EOSINOPHILS RELATIVE PERCENT: 1.3 %
GFR AFRICAN AMERICAN: >60
GFR NON-AFRICAN AMERICAN: >60
GLUCOSE BLD-MCNC: 97 MG/DL (ref 70–99)
HCT VFR BLD CALC: 45.2 % (ref 40.5–52.5)
HDLC SERPL-MCNC: 62 MG/DL (ref 40–60)
HEMOGLOBIN: 14.9 G/DL (ref 13.5–17.5)
LDL CHOLESTEROL CALCULATED: 130 MG/DL
LYMPHOCYTES ABSOLUTE: 2.1 K/UL (ref 1–5.1)
LYMPHOCYTES RELATIVE PERCENT: 20.5 %
MCH RBC QN AUTO: 32.1 PG (ref 26–34)
MCHC RBC AUTO-ENTMCNC: 32.9 G/DL (ref 31–36)
MCV RBC AUTO: 97.5 FL (ref 80–100)
MONOCYTES ABSOLUTE: 1.1 K/UL (ref 0–1.3)
MONOCYTES RELATIVE PERCENT: 10.6 %
NEUTROPHILS ABSOLUTE: 6.7 K/UL (ref 1.7–7.7)
NEUTROPHILS RELATIVE PERCENT: 66.9 %
PDW BLD-RTO: 14.5 % (ref 12.4–15.4)
PLATELET # BLD: 231 K/UL (ref 135–450)
PMV BLD AUTO: 9.7 FL (ref 5–10.5)
POTASSIUM SERPL-SCNC: 5.2 MMOL/L (ref 3.5–5.1)
RBC # BLD: 4.64 M/UL (ref 4.2–5.9)
SODIUM BLD-SCNC: 147 MMOL/L (ref 136–145)
TOTAL CK: 42 U/L (ref 39–308)
TOTAL PROTEIN: 6.9 G/DL (ref 6.4–8.2)
TRIGL SERPL-MCNC: 116 MG/DL (ref 0–150)
VLDLC SERPL CALC-MCNC: 23 MG/DL
WBC # BLD: 10 K/UL (ref 4–11)

## 2018-01-30 LAB
ESTIMATED AVERAGE GLUCOSE: 114 MG/DL
HBA1C MFR BLD: 5.6 %

## 2018-02-08 ENCOUNTER — OFFICE VISIT (OUTPATIENT)
Dept: INTERNAL MEDICINE CLINIC | Age: 78
End: 2018-02-08

## 2018-02-08 VITALS — HEART RATE: 76 BPM | DIASTOLIC BLOOD PRESSURE: 66 MMHG | SYSTOLIC BLOOD PRESSURE: 130 MMHG

## 2018-02-08 DIAGNOSIS — I48.20 CHRONIC ATRIAL FIBRILLATION (HCC): ICD-10-CM

## 2018-02-08 DIAGNOSIS — Z79.01 LONG TERM CURRENT USE OF ANTICOAGULANT THERAPY: ICD-10-CM

## 2018-02-08 DIAGNOSIS — E78.2 MIXED HYPERLIPIDEMIA: ICD-10-CM

## 2018-02-08 DIAGNOSIS — J44.9 COPD, SEVERE (HCC): Primary | ICD-10-CM

## 2018-02-08 DIAGNOSIS — I10 ESSENTIAL HYPERTENSION: ICD-10-CM

## 2018-02-08 LAB
INTERNATIONAL NORMALIZATION RATIO, POC: 1.2
PROTHROMBIN TIME, POC: ABNORMAL

## 2018-02-08 PROCEDURE — G8427 DOCREV CUR MEDS BY ELIG CLIN: HCPCS | Performed by: INTERNAL MEDICINE

## 2018-02-08 PROCEDURE — 3023F SPIROM DOC REV: CPT | Performed by: INTERNAL MEDICINE

## 2018-02-08 PROCEDURE — G8484 FLU IMMUNIZE NO ADMIN: HCPCS | Performed by: INTERNAL MEDICINE

## 2018-02-08 PROCEDURE — 1123F ACP DISCUSS/DSCN MKR DOCD: CPT | Performed by: INTERNAL MEDICINE

## 2018-02-08 PROCEDURE — 99213 OFFICE O/P EST LOW 20 MIN: CPT | Performed by: INTERNAL MEDICINE

## 2018-02-08 PROCEDURE — 4004F PT TOBACCO SCREEN RCVD TLK: CPT | Performed by: INTERNAL MEDICINE

## 2018-02-08 PROCEDURE — G8598 ASA/ANTIPLAT THER USED: HCPCS | Performed by: INTERNAL MEDICINE

## 2018-02-08 PROCEDURE — 4040F PNEUMOC VAC/ADMIN/RCVD: CPT | Performed by: INTERNAL MEDICINE

## 2018-02-08 PROCEDURE — G8417 CALC BMI ABV UP PARAM F/U: HCPCS | Performed by: INTERNAL MEDICINE

## 2018-02-08 PROCEDURE — 85610 PROTHROMBIN TIME: CPT | Performed by: INTERNAL MEDICINE

## 2018-02-08 PROCEDURE — G8926 SPIRO NO PERF OR DOC: HCPCS | Performed by: INTERNAL MEDICINE

## 2018-02-14 ENCOUNTER — OFFICE VISIT (OUTPATIENT)
Dept: CARDIOLOGY CLINIC | Age: 78
End: 2018-02-14

## 2018-02-14 ENCOUNTER — PROCEDURE VISIT (OUTPATIENT)
Dept: CARDIOLOGY CLINIC | Age: 78
End: 2018-02-14

## 2018-02-14 VITALS
HEIGHT: 70 IN | BODY MASS INDEX: 30.69 KG/M2 | DIASTOLIC BLOOD PRESSURE: 74 MMHG | SYSTOLIC BLOOD PRESSURE: 128 MMHG | WEIGHT: 214.4 LBS | HEART RATE: 70 BPM

## 2018-02-14 VITALS — DIASTOLIC BLOOD PRESSURE: 74 MMHG | SYSTOLIC BLOOD PRESSURE: 128 MMHG | HEART RATE: 70 BPM

## 2018-02-14 DIAGNOSIS — I48.0 PAROXYSMAL ATRIAL FIBRILLATION (HCC): Primary | ICD-10-CM

## 2018-02-14 DIAGNOSIS — Z95.0 CARDIAC PACEMAKER IN SITU: Primary | ICD-10-CM

## 2018-02-14 PROCEDURE — 4004F PT TOBACCO SCREEN RCVD TLK: CPT | Performed by: INTERNAL MEDICINE

## 2018-02-14 PROCEDURE — 93279 PRGRMG DEV EVAL PM/LDLS PM: CPT | Performed by: INTERNAL MEDICINE

## 2018-02-14 PROCEDURE — G8427 DOCREV CUR MEDS BY ELIG CLIN: HCPCS | Performed by: INTERNAL MEDICINE

## 2018-02-14 PROCEDURE — G8484 FLU IMMUNIZE NO ADMIN: HCPCS | Performed by: INTERNAL MEDICINE

## 2018-02-14 PROCEDURE — 99214 OFFICE O/P EST MOD 30 MIN: CPT | Performed by: INTERNAL MEDICINE

## 2018-02-14 PROCEDURE — G8417 CALC BMI ABV UP PARAM F/U: HCPCS | Performed by: INTERNAL MEDICINE

## 2018-02-14 PROCEDURE — G8598 ASA/ANTIPLAT THER USED: HCPCS | Performed by: INTERNAL MEDICINE

## 2018-02-14 PROCEDURE — 1123F ACP DISCUSS/DSCN MKR DOCD: CPT | Performed by: INTERNAL MEDICINE

## 2018-02-14 PROCEDURE — 4040F PNEUMOC VAC/ADMIN/RCVD: CPT | Performed by: INTERNAL MEDICINE

## 2018-02-14 NOTE — PROGRESS NOTES
TAKE 3 TABLETS ON THE OTHER     DAYS OR AS DIRECTED. 220 tablet 3    nizatidine (AXID) 150 MG capsule TAKE 1 CAPSULE DAILY 90 capsule 3    traMADol (ULTRAM) 50 MG tablet Take 1 tablet by mouth every 8 hours as needed (for headaches) 100 tablet 2    valsartan (DIOVAN) 160 MG tablet TAKE 1 TABLET DAILY (Patient taking differently: take 1/2 tab per day) 90 tablet 3    ipratropium-albuterol (DUONEB) 0.5-2.5 (3) MG/3ML SOLN nebulizer solution Inhale 1 vial into the lungs every 4 hours      aspirin 81 MG EC tablet Take 1 tablet by mouth daily 30 tablet 3    Multiple Vitamins-Minerals (CENTRUM PO) Take by mouth nightly Over The Counter      Cholecalciferol (VITAMIN D PO) Take by mouth 2 times daily Over The Counter       Resveratrol 250 MG CAPS Take by mouth every morning      fluticasone-salmeterol (ADVAIR HFA) 230-21 MCG/ACT inhaler Inhale 2 puffs into the lungs 2 times daily. (Patient taking differently:   Inhale 2 puffs into the lungs as needed ) 4 Inhaler 3     No current facility-administered medications for this visit. Allergies: Other;  Atorvastatin; Flomax [tamsulosin hcl]; and Proscar [finasteride]  Past Medical History:   Diagnosis Date    Acid reflux     Adenomatous polyp of colon 1-2009    last colonoscopy recomm 3 yr follow up    Anticoagulated 2010    takes for afib Dr Rubina Dotson monitors INR 1.2 on 02/08/2018    Arthritis     \"arthritis in low back\"    CAD (coronary artery disease)     follow with dr April Love- cardiac clearance for surgery done 10/31/2017 on chart)    Chronic atrial fibrillation (HCC)     Sees Dr. Leatha Morelos COPD (chronic obstructive pulmonary disease) ([facility] [address]     follows with dr Mario Posada Diverticulitis     Diverticulosis of colon     left colon    Enlarged prostate     \"had to ream me out a couple of times\":   [facility] H/O cardiac catheterization 1/31/2006    R codominant, RCA patent, LAD patent with minimal nonobstructive irreg, CIRC patent and codominant, RI small but patent   

## 2018-02-28 RX ORDER — NIZATIDINE 150 MG/1
CAPSULE ORAL
Qty: 90 CAPSULE | Refills: 3 | Status: SHIPPED | OUTPATIENT
Start: 2018-02-28 | End: 2019-02-21 | Stop reason: SDUPTHER

## 2018-03-08 ENCOUNTER — ANTI-COAG VISIT (OUTPATIENT)
Dept: INTERNAL MEDICINE CLINIC | Age: 78
End: 2018-03-08

## 2018-03-08 DIAGNOSIS — Z79.01 LONG TERM CURRENT USE OF ANTICOAGULANT THERAPY: Primary | ICD-10-CM

## 2018-03-08 LAB
INR BLD: 1.12 (ref 0.85–1.15)
PROTHROMBIN TIME: 12.7 SEC (ref 9.6–13)

## 2018-03-27 ENCOUNTER — NURSE ONLY (OUTPATIENT)
Dept: INTERNAL MEDICINE CLINIC | Age: 78
End: 2018-03-27

## 2018-03-27 DIAGNOSIS — Z79.01 LONG TERM CURRENT USE OF ANTICOAGULANT THERAPY: ICD-10-CM

## 2018-03-27 DIAGNOSIS — I48.20 CHRONIC ATRIAL FIBRILLATION (HCC): ICD-10-CM

## 2018-03-27 LAB
INTERNATIONAL NORMALIZATION RATIO, POC: 2.1
PROTHROMBIN TIME, POC: NORMAL

## 2018-03-27 PROCEDURE — 85610 PROTHROMBIN TIME: CPT | Performed by: INTERNAL MEDICINE

## 2018-04-18 DIAGNOSIS — E78.2 MIXED HYPERLIPIDEMIA: ICD-10-CM

## 2018-04-18 DIAGNOSIS — E03.4 HYPOTHYROIDISM DUE TO ACQUIRED ATROPHY OF THYROID: ICD-10-CM

## 2018-04-18 DIAGNOSIS — Z79.01 LONG TERM CURRENT USE OF ANTICOAGULANT THERAPY: Primary | ICD-10-CM

## 2018-04-18 DIAGNOSIS — E11.69 TYPE 2 DIABETES MELLITUS WITH OTHER SPECIFIED COMPLICATION, UNSPECIFIED LONG TERM INSULIN USE STATUS: ICD-10-CM

## 2018-04-18 DIAGNOSIS — I10 ESSENTIAL HYPERTENSION: ICD-10-CM

## 2018-04-27 DIAGNOSIS — E78.2 MIXED HYPERLIPIDEMIA: ICD-10-CM

## 2018-04-27 DIAGNOSIS — I10 ESSENTIAL HYPERTENSION: ICD-10-CM

## 2018-04-27 DIAGNOSIS — E11.69 TYPE 2 DIABETES MELLITUS WITH OTHER SPECIFIED COMPLICATION, UNSPECIFIED LONG TERM INSULIN USE STATUS: ICD-10-CM

## 2018-04-27 DIAGNOSIS — E03.4 HYPOTHYROIDISM DUE TO ACQUIRED ATROPHY OF THYROID: ICD-10-CM

## 2018-04-27 LAB
ALBUMIN SERPL-MCNC: 4.4 G/DL (ref 3.4–5)
ALP BLD-CCNC: 127 U/L (ref 40–129)
ALT SERPL-CCNC: 11 U/L (ref 10–40)
ANION GAP SERPL CALCULATED.3IONS-SCNC: 15 MMOL/L (ref 3–16)
AST SERPL-CCNC: 16 U/L (ref 15–37)
BASOPHILS ABSOLUTE: 0 K/UL (ref 0–0.2)
BASOPHILS RELATIVE PERCENT: 0.6 %
BILIRUB SERPL-MCNC: 0.5 MG/DL (ref 0–1)
BILIRUBIN DIRECT: <0.2 MG/DL (ref 0–0.3)
BILIRUBIN, INDIRECT: NORMAL MG/DL (ref 0–1)
BUN BLDV-MCNC: 19 MG/DL (ref 7–20)
CALCIUM SERPL-MCNC: 9.2 MG/DL (ref 8.3–10.6)
CHLORIDE BLD-SCNC: 104 MMOL/L (ref 99–110)
CHOLESTEROL, TOTAL: 208 MG/DL (ref 0–199)
CO2: 25 MMOL/L (ref 21–32)
CREAT SERPL-MCNC: 1.1 MG/DL (ref 0.8–1.3)
EOSINOPHILS ABSOLUTE: 0.2 K/UL (ref 0–0.6)
EOSINOPHILS RELATIVE PERCENT: 1.8 %
GFR AFRICAN AMERICAN: >60
GFR NON-AFRICAN AMERICAN: >60
GLUCOSE BLD-MCNC: 101 MG/DL (ref 70–99)
HCT VFR BLD CALC: 48.4 % (ref 40.5–52.5)
HDLC SERPL-MCNC: 61 MG/DL (ref 40–60)
HEMOGLOBIN: 15.9 G/DL (ref 13.5–17.5)
INR BLD: 1.72 (ref 0.85–1.15)
LDL CHOLESTEROL CALCULATED: 123 MG/DL
LYMPHOCYTES ABSOLUTE: 1.8 K/UL (ref 1–5.1)
LYMPHOCYTES RELATIVE PERCENT: 21 %
MCH RBC QN AUTO: 30.7 PG (ref 26–34)
MCHC RBC AUTO-ENTMCNC: 32.9 G/DL (ref 31–36)
MCV RBC AUTO: 93.2 FL (ref 80–100)
MONOCYTES ABSOLUTE: 1 K/UL (ref 0–1.3)
MONOCYTES RELATIVE PERCENT: 11.6 %
NEUTROPHILS ABSOLUTE: 5.5 K/UL (ref 1.7–7.7)
NEUTROPHILS RELATIVE PERCENT: 65 %
PDW BLD-RTO: 15.5 % (ref 12.4–15.4)
PLATELET # BLD: 206 K/UL (ref 135–450)
PMV BLD AUTO: 10.1 FL (ref 5–10.5)
POTASSIUM SERPL-SCNC: 4.9 MMOL/L (ref 3.5–5.1)
PROTHROMBIN TIME: 19.4 SEC (ref 9.6–13)
RBC # BLD: 5.2 M/UL (ref 4.2–5.9)
SODIUM BLD-SCNC: 144 MMOL/L (ref 136–145)
TOTAL CK: 50 U/L (ref 39–308)
TOTAL PROTEIN: 6.9 G/DL (ref 6.4–8.2)
TRIGL SERPL-MCNC: 122 MG/DL (ref 0–150)
TSH SERPL DL<=0.05 MIU/L-ACNC: 4.05 UIU/ML (ref 0.27–4.2)
VLDLC SERPL CALC-MCNC: 24 MG/DL
WBC # BLD: 8.4 K/UL (ref 4–11)

## 2018-04-28 LAB
ESTIMATED AVERAGE GLUCOSE: 139.9 MG/DL
HBA1C MFR BLD: 6.5 %

## 2018-05-08 ENCOUNTER — OFFICE VISIT (OUTPATIENT)
Dept: INTERNAL MEDICINE CLINIC | Age: 78
End: 2018-05-08

## 2018-05-08 VITALS
DIASTOLIC BLOOD PRESSURE: 70 MMHG | HEART RATE: 64 BPM | WEIGHT: 211.6 LBS | SYSTOLIC BLOOD PRESSURE: 120 MMHG | BODY MASS INDEX: 30.36 KG/M2

## 2018-05-08 DIAGNOSIS — E78.2 MIXED HYPERLIPIDEMIA: ICD-10-CM

## 2018-05-08 DIAGNOSIS — I25.10 ASHD (ARTERIOSCLEROTIC HEART DISEASE): Primary | ICD-10-CM

## 2018-05-08 DIAGNOSIS — J44.9 COPD, SEVERE (HCC): ICD-10-CM

## 2018-05-08 DIAGNOSIS — I48.0 PAROXYSMAL ATRIAL FIBRILLATION (HCC): ICD-10-CM

## 2018-05-08 DIAGNOSIS — M17.11 PRIMARY OSTEOARTHRITIS OF RIGHT KNEE: ICD-10-CM

## 2018-05-08 PROCEDURE — G8598 ASA/ANTIPLAT THER USED: HCPCS | Performed by: INTERNAL MEDICINE

## 2018-05-08 PROCEDURE — 3023F SPIROM DOC REV: CPT | Performed by: INTERNAL MEDICINE

## 2018-05-08 PROCEDURE — 99213 OFFICE O/P EST LOW 20 MIN: CPT | Performed by: INTERNAL MEDICINE

## 2018-05-08 PROCEDURE — 4040F PNEUMOC VAC/ADMIN/RCVD: CPT | Performed by: INTERNAL MEDICINE

## 2018-05-08 PROCEDURE — 1123F ACP DISCUSS/DSCN MKR DOCD: CPT | Performed by: INTERNAL MEDICINE

## 2018-05-08 PROCEDURE — G8427 DOCREV CUR MEDS BY ELIG CLIN: HCPCS | Performed by: INTERNAL MEDICINE

## 2018-05-08 PROCEDURE — G8417 CALC BMI ABV UP PARAM F/U: HCPCS | Performed by: INTERNAL MEDICINE

## 2018-05-08 PROCEDURE — G8926 SPIRO NO PERF OR DOC: HCPCS | Performed by: INTERNAL MEDICINE

## 2018-05-08 PROCEDURE — 4004F PT TOBACCO SCREEN RCVD TLK: CPT | Performed by: INTERNAL MEDICINE

## 2018-05-16 RX ORDER — CLOPIDOGREL BISULFATE 75 MG/1
TABLET ORAL
Qty: 90 TABLET | Refills: 3 | OUTPATIENT
Start: 2018-05-16

## 2018-05-23 ENCOUNTER — PROCEDURE VISIT (OUTPATIENT)
Dept: CARDIOLOGY CLINIC | Age: 78
End: 2018-05-23

## 2018-05-23 VITALS — DIASTOLIC BLOOD PRESSURE: 62 MMHG | HEART RATE: 68 BPM | SYSTOLIC BLOOD PRESSURE: 104 MMHG

## 2018-05-23 DIAGNOSIS — Z95.0 CARDIAC PACEMAKER IN SITU: Primary | ICD-10-CM

## 2018-05-23 PROCEDURE — 93279 PRGRMG DEV EVAL PM/LDLS PM: CPT | Performed by: INTERNAL MEDICINE

## 2018-05-30 ENCOUNTER — OFFICE VISIT (OUTPATIENT)
Dept: ORTHOPEDIC SURGERY | Age: 78
End: 2018-05-30

## 2018-05-30 VITALS — WEIGHT: 215 LBS | HEIGHT: 70 IN | RESPIRATION RATE: 16 BRPM | BODY MASS INDEX: 30.78 KG/M2

## 2018-05-30 DIAGNOSIS — M17.12 PRIMARY OSTEOARTHRITIS OF LEFT KNEE: Primary | ICD-10-CM

## 2018-05-30 DIAGNOSIS — R52 PAIN: ICD-10-CM

## 2018-05-30 DIAGNOSIS — M11.262: ICD-10-CM

## 2018-05-30 PROBLEM — N40.1 HYPERTROPHY OF PROSTATE WITH URINARY OBSTRUCTION AND OTHER LOWER URINARY TRACT SYMPTOMS (LUTS): Status: ACTIVE | Noted: 2018-05-30

## 2018-05-30 PROBLEM — N13.8 HYPERTROPHY OF PROSTATE WITH URINARY OBSTRUCTION AND OTHER LOWER URINARY TRACT SYMPTOMS (LUTS): Status: ACTIVE | Noted: 2018-05-30

## 2018-05-30 PROBLEM — M62.838 CERVICAL PARASPINAL MUSCLE SPASM: Status: ACTIVE | Noted: 2017-05-18

## 2018-05-30 PROBLEM — N40.0 ENLARGED PROSTATE WITHOUT LOWER URINARY TRACT SYMPTOMS (LUTS): Status: ACTIVE | Noted: 2018-05-30

## 2018-05-30 PROBLEM — N40.1 ENLARGED PROSTATE WITH LOWER URINARY TRACT SYMPTOMS (LUTS): Status: ACTIVE | Noted: 2018-05-30

## 2018-05-30 PROBLEM — R42 LIGHTHEADEDNESS: Status: ACTIVE | Noted: 2017-05-18

## 2018-05-30 PROBLEM — I63.9 MULTIPLE CEREBRAL INFARCTIONS (HCC): Status: ACTIVE | Noted: 2017-05-18

## 2018-05-30 PROBLEM — R10.9 ABDOMINAL PAIN: Status: ACTIVE | Noted: 2018-05-30

## 2018-05-30 PROBLEM — N39.0 URINARY TRACT INFECTION: Status: ACTIVE | Noted: 2018-05-30

## 2018-05-30 PROBLEM — R42 DIZZINESS: Status: ACTIVE | Noted: 2017-05-18

## 2018-05-30 PROBLEM — G25.2 RESTING TREMOR: Status: ACTIVE | Noted: 2017-02-14

## 2018-05-30 PROBLEM — R33.9 INCOMPLETE BLADDER EMPTYING: Status: ACTIVE | Noted: 2018-05-30

## 2018-05-30 PROBLEM — N40.0 HYPERTROPHY OF PROSTATE WITHOUT URINARY OBSTRUCTION AND OTHER LOWER URINARY TRACT SYMPTOMS (LUTS): Status: ACTIVE | Noted: 2018-05-30

## 2018-05-30 PROBLEM — G20 PARKINSON DISEASE (HCC): Status: ACTIVE | Noted: 2017-02-14

## 2018-05-30 PROBLEM — R31.9 HEMATURIA: Status: ACTIVE | Noted: 2018-05-30

## 2018-05-30 PROBLEM — R27.0 MULTIFACTORIAL ATAXIA: Status: ACTIVE | Noted: 2017-05-18

## 2018-05-30 PROBLEM — R27.0 ATAXIA: Status: ACTIVE | Noted: 2017-02-14

## 2018-05-30 PROBLEM — M54.50 LOW BACK PAIN: Status: ACTIVE | Noted: 2018-05-30

## 2018-05-30 PROBLEM — G20.A1 PARKINSON DISEASE: Status: ACTIVE | Noted: 2017-02-14

## 2018-05-30 PROBLEM — Z12.5 SPECIAL SCREENING FOR MALIGNANT NEOPLASM OF PROSTATE: Status: ACTIVE | Noted: 2018-05-30

## 2018-05-30 PROBLEM — R35.1 NOCTURIA: Status: ACTIVE | Noted: 2018-05-30

## 2018-05-30 PROBLEM — G44.209 TENSION TYPE HEADACHE: Status: ACTIVE | Noted: 2017-05-18

## 2018-05-30 PROCEDURE — 99203 OFFICE O/P NEW LOW 30 MIN: CPT | Performed by: ORTHOPAEDIC SURGERY

## 2018-05-30 PROCEDURE — G8427 DOCREV CUR MEDS BY ELIG CLIN: HCPCS | Performed by: ORTHOPAEDIC SURGERY

## 2018-05-30 PROCEDURE — G8417 CALC BMI ABV UP PARAM F/U: HCPCS | Performed by: ORTHOPAEDIC SURGERY

## 2018-05-30 ASSESSMENT — ENCOUNTER SYMPTOMS
WHEEZING: 1
COUGH: 1
EYES NEGATIVE: 1
GASTROINTESTINAL NEGATIVE: 1
SHORTNESS OF BREATH: 1
BACK PAIN: 1

## 2018-05-31 DIAGNOSIS — E78.2 MIXED HYPERLIPIDEMIA: ICD-10-CM

## 2018-05-31 DIAGNOSIS — I48.0 PAROXYSMAL ATRIAL FIBRILLATION (HCC): ICD-10-CM

## 2018-05-31 LAB
ALBUMIN SERPL-MCNC: 3.6 G/DL (ref 3.4–5)
ALP BLD-CCNC: 95 U/L (ref 40–129)
ALT SERPL-CCNC: 11 U/L (ref 10–40)
AST SERPL-CCNC: 13 U/L (ref 15–37)
BILIRUB SERPL-MCNC: <0.2 MG/DL (ref 0–1)
BILIRUBIN DIRECT: <0.2 MG/DL (ref 0–0.3)
BILIRUBIN, INDIRECT: ABNORMAL MG/DL (ref 0–1)
CHOLESTEROL, TOTAL: 126 MG/DL (ref 0–199)
HDLC SERPL-MCNC: 49 MG/DL (ref 40–60)
LDL CHOLESTEROL CALCULATED: 59 MG/DL
REASON FOR REJECTION: NORMAL
REJECTED TEST: NORMAL
TOTAL PROTEIN: 5.9 G/DL (ref 6.4–8.2)
TRIGL SERPL-MCNC: 90 MG/DL (ref 0–150)
VLDLC SERPL CALC-MCNC: 18 MG/DL

## 2018-06-01 ENCOUNTER — NURSE ONLY (OUTPATIENT)
Dept: INTERNAL MEDICINE CLINIC | Age: 78
End: 2018-06-01

## 2018-06-01 DIAGNOSIS — Z79.01 LONG TERM CURRENT USE OF ANTICOAGULANT THERAPY: ICD-10-CM

## 2018-06-01 DIAGNOSIS — I48.20 CHRONIC ATRIAL FIBRILLATION (HCC): ICD-10-CM

## 2018-06-01 LAB
INTERNATIONAL NORMALIZATION RATIO, POC: 2.1
PROTHROMBIN TIME, POC: NORMAL

## 2018-06-01 PROCEDURE — 85610 PROTHROMBIN TIME: CPT | Performed by: INTERNAL MEDICINE

## 2018-06-18 DIAGNOSIS — E78.5 DYSLIPIDEMIA: ICD-10-CM

## 2018-06-18 RX ORDER — AMLODIPINE BESYLATE 10 MG/1
TABLET ORAL
Qty: 90 TABLET | Refills: 3 | Status: SHIPPED | OUTPATIENT
Start: 2018-06-18 | End: 2019-06-05 | Stop reason: SDUPTHER

## 2018-06-25 ENCOUNTER — TELEPHONE (OUTPATIENT)
Dept: INTERNAL MEDICINE CLINIC | Age: 78
End: 2018-06-25

## 2018-06-29 PROBLEM — Z12.5 SPECIAL SCREENING FOR MALIGNANT NEOPLASM OF PROSTATE: Status: RESOLVED | Noted: 2018-05-30 | Resolved: 2018-06-29

## 2018-06-29 PROBLEM — N39.0 URINARY TRACT INFECTION: Status: RESOLVED | Noted: 2018-05-30 | Resolved: 2018-06-29

## 2018-07-02 ENCOUNTER — NURSE ONLY (OUTPATIENT)
Dept: INTERNAL MEDICINE CLINIC | Age: 78
End: 2018-07-02

## 2018-07-02 ENCOUNTER — ANTI-COAG VISIT (OUTPATIENT)
Dept: INTERNAL MEDICINE CLINIC | Age: 78
End: 2018-07-02

## 2018-07-02 VITALS — HEART RATE: 60 BPM | SYSTOLIC BLOOD PRESSURE: 116 MMHG | DIASTOLIC BLOOD PRESSURE: 70 MMHG

## 2018-07-02 DIAGNOSIS — I48.20 CHRONIC ATRIAL FIBRILLATION (HCC): Primary | ICD-10-CM

## 2018-07-02 LAB
INTERNATIONAL NORMALIZATION RATIO, POC: 1.5
PROTHROMBIN TIME, POC: 17.9

## 2018-07-02 PROCEDURE — 85610 PROTHROMBIN TIME: CPT | Performed by: INTERNAL MEDICINE

## 2018-07-02 NOTE — PROGRESS NOTES
Patient presented to the office to have fingerstick INR performed used his right hand finger with good blood return and tolerated well. Results will be forwarded to Dr Eulogio Urbano for review and direction.   PT 17.9  INR 1.5  Patient currently taking Coumadin 2 mg 1 tab other days; 1/2 tab Wed & Fri.

## 2018-07-06 RX ORDER — VALSARTAN 160 MG/1
TABLET ORAL
Qty: 90 TABLET | Refills: 3 | Status: SHIPPED | OUTPATIENT
Start: 2018-07-06 | End: 2018-07-25 | Stop reason: CLARIF

## 2018-07-10 ENCOUNTER — NURSE ONLY (OUTPATIENT)
Dept: INTERNAL MEDICINE CLINIC | Age: 78
End: 2018-07-10

## 2018-07-10 DIAGNOSIS — Z79.01 LONG TERM CURRENT USE OF ANTICOAGULANT THERAPY: Primary | ICD-10-CM

## 2018-07-10 DIAGNOSIS — I48.20 CHRONIC ATRIAL FIBRILLATION (HCC): ICD-10-CM

## 2018-07-10 LAB
INTERNATIONAL NORMALIZATION RATIO, POC: 3.2
PROTHROMBIN TIME, POC: ABNORMAL

## 2018-07-10 PROCEDURE — 85610 PROTHROMBIN TIME: CPT | Performed by: INTERNAL MEDICINE

## 2018-07-10 ASSESSMENT — PATIENT HEALTH QUESTIONNAIRE - PHQ9
1. LITTLE INTEREST OR PLEASURE IN DOING THINGS: 0
SUM OF ALL RESPONSES TO PHQ QUESTIONS 1-9: 0
SUM OF ALL RESPONSES TO PHQ9 QUESTIONS 1 & 2: 0
2. FEELING DOWN, DEPRESSED OR HOPELESS: 0

## 2018-07-10 NOTE — PROGRESS NOTES
Nancy Magdaleno presented to the office to have fingerstick INR performed-used left hand middle finger with good blood return and tolerated well-results will be forwarded to Dr Suhail Dunham for review and directions

## 2018-07-17 ENCOUNTER — NURSE ONLY (OUTPATIENT)
Dept: INTERNAL MEDICINE CLINIC | Age: 78
End: 2018-07-17

## 2018-07-17 DIAGNOSIS — I48.20 CHRONIC ATRIAL FIBRILLATION (HCC): ICD-10-CM

## 2018-07-17 DIAGNOSIS — Z79.01 LONG TERM CURRENT USE OF ANTICOAGULANT THERAPY: ICD-10-CM

## 2018-07-17 LAB
INTERNATIONAL NORMALIZATION RATIO, POC: 2.8
PROTHROMBIN TIME, POC: NORMAL

## 2018-07-17 PROCEDURE — 85610 PROTHROMBIN TIME: CPT | Performed by: INTERNAL MEDICINE

## 2018-07-24 ENCOUNTER — TELEPHONE (OUTPATIENT)
Dept: INTERNAL MEDICINE CLINIC | Age: 78
End: 2018-07-24

## 2018-07-25 RX ORDER — LOSARTAN POTASSIUM 100 MG/1
100 TABLET ORAL DAILY
Qty: 90 TABLET | Refills: 3 | Status: SHIPPED | OUTPATIENT
Start: 2018-07-25 | End: 2019-07-20 | Stop reason: SDUPTHER

## 2018-07-25 NOTE — TELEPHONE ENCOUNTER
Spoke with pt, he verbalized understanding. Patient requested rx be sent to [facility]. Rx sent over.

## 2018-08-03 ENCOUNTER — NURSE ONLY (OUTPATIENT)
Dept: INTERNAL MEDICINE CLINIC | Age: 78
End: 2018-08-03

## 2018-08-03 DIAGNOSIS — Z79.01 LONG TERM CURRENT USE OF ANTICOAGULANT THERAPY: ICD-10-CM

## 2018-08-03 DIAGNOSIS — I48.20 CHRONIC ATRIAL FIBRILLATION (HCC): ICD-10-CM

## 2018-08-03 LAB
INTERNATIONAL NORMALIZATION RATIO, POC: 2.4
PROTHROMBIN TIME, POC: NORMAL

## 2018-08-03 PROCEDURE — 85610 PROTHROMBIN TIME: CPT | Performed by: INTERNAL MEDICINE

## 2018-08-03 NOTE — PROGRESS NOTES
Gabo Garcia presented to the office to have fingerstick INR performed-used left hand middle finger with good blood return and tolerated well-results will be forwarded to Dr Stevenson Rothman for review and directions

## 2018-08-17 ENCOUNTER — NURSE ONLY (OUTPATIENT)
Dept: INTERNAL MEDICINE CLINIC | Age: 78
End: 2018-08-17

## 2018-08-17 DIAGNOSIS — I48.20 CHRONIC ATRIAL FIBRILLATION (HCC): ICD-10-CM

## 2018-08-17 DIAGNOSIS — Z79.01 LONG TERM CURRENT USE OF ANTICOAGULANT THERAPY: ICD-10-CM

## 2018-08-17 LAB
INTERNATIONAL NORMALIZATION RATIO, POC: 1.8
PROTHROMBIN TIME, POC: ABNORMAL

## 2018-08-17 PROCEDURE — 85610 PROTHROMBIN TIME: CPT | Performed by: INTERNAL MEDICINE

## 2018-08-17 NOTE — PROGRESS NOTES
Patient in office today for INR, INR at 1.8 today. Spoke with PCP before patient left office, he stated continue current dosage and repeat in 2 weeks. Pt verbalized understanding.

## 2018-08-29 ENCOUNTER — PROCEDURE VISIT (OUTPATIENT)
Dept: CARDIOLOGY CLINIC | Age: 78
End: 2018-08-29

## 2018-08-29 ENCOUNTER — OFFICE VISIT (OUTPATIENT)
Dept: CARDIOLOGY CLINIC | Age: 78
End: 2018-08-29

## 2018-08-29 VITALS
WEIGHT: 211.2 LBS | HEART RATE: 66 BPM | HEIGHT: 70 IN | SYSTOLIC BLOOD PRESSURE: 118 MMHG | DIASTOLIC BLOOD PRESSURE: 68 MMHG | BODY MASS INDEX: 30.24 KG/M2

## 2018-08-29 VITALS — DIASTOLIC BLOOD PRESSURE: 68 MMHG | SYSTOLIC BLOOD PRESSURE: 118 MMHG | HEART RATE: 66 BPM

## 2018-08-29 DIAGNOSIS — Z95.0 CARDIAC PACEMAKER IN SITU: Primary | ICD-10-CM

## 2018-08-29 DIAGNOSIS — I25.10 CORONARY ARTERY DISEASE INVOLVING NATIVE CORONARY ARTERY OF NATIVE HEART WITHOUT ANGINA PECTORIS: Primary | ICD-10-CM

## 2018-08-29 PROCEDURE — G8427 DOCREV CUR MEDS BY ELIG CLIN: HCPCS | Performed by: INTERNAL MEDICINE

## 2018-08-29 PROCEDURE — 4004F PT TOBACCO SCREEN RCVD TLK: CPT | Performed by: INTERNAL MEDICINE

## 2018-08-29 PROCEDURE — 99214 OFFICE O/P EST MOD 30 MIN: CPT | Performed by: INTERNAL MEDICINE

## 2018-08-29 PROCEDURE — G8417 CALC BMI ABV UP PARAM F/U: HCPCS | Performed by: INTERNAL MEDICINE

## 2018-08-29 PROCEDURE — G8598 ASA/ANTIPLAT THER USED: HCPCS | Performed by: INTERNAL MEDICINE

## 2018-08-29 PROCEDURE — 4040F PNEUMOC VAC/ADMIN/RCVD: CPT | Performed by: INTERNAL MEDICINE

## 2018-08-29 PROCEDURE — 93279 PRGRMG DEV EVAL PM/LDLS PM: CPT | Performed by: INTERNAL MEDICINE

## 2018-08-29 PROCEDURE — 1123F ACP DISCUSS/DSCN MKR DOCD: CPT | Performed by: INTERNAL MEDICINE

## 2018-08-29 PROCEDURE — 1101F PT FALLS ASSESS-DOCD LE1/YR: CPT | Performed by: INTERNAL MEDICINE

## 2018-08-29 NOTE — PROGRESS NOTES
January Velez MD        OFFICE  FOLLOWUP NOTE    Chief complaints: patient is here for management of CAD, PPM, HTN, AFIB, DYSLPIDEMIA    Subjective: patient feels better, no chest pain, no shortness of breath, no dizziness, no palpitations    JOSE Weber is a [de-identified] y. o.year old who  has a past medical history of Acid reflux; Adenomatous polyp of colon; Anticoagulated; Arthritis; CAD (coronary artery disease); Chronic atrial fibrillation (HCC); COPD (chronic obstructive pulmonary disease) ([facility] [address] Diverticulitis; Diverticulosis of colon; Enlarged prostate; H/O cardiac catheterization; H/O cardiovascular stress test; H/O cardiovascular stress test; H/O cardiovascular stress test; H/O Doppler ultrasound; H/O echocardiogram; H/O echocardiogram; H/O percutaneous left heart catheterization; H/O unstable angina; History of Doppler ultrasound; History of Doppler ultrasound; History of Holter monitoring; Hx of CT scan of chest; HX OTHER MEDICAL; Hyperlipidemia; Hypertension; Kidney stones; Nephrolithiasis; Nocturia; Pacemaker; Peptic ulcer, unspecified site, unspecified as acute or chronic, without mention of hemorrhage or perforation; Pneumonia; Shortness of breath; Skin Cancer Arms And Ears; Sleep apnea; Stented coronary artery; Teeth missing; Wears dentures; and Wears glasses.  and presents for management of CAD, PPM, HTN, AFIB, DYSLPIDEMIA, which are well controlled      Current Outpatient Prescriptions   Medication Sig Dispense Refill    carbidopa-levodopa (SINEMET)  MG per tablet Take 1 tablet by mouth      Evolocumab (REPATHA SC) Inject into the skin      losartan (COZAAR) 100 MG tablet Take 1 tablet by mouth daily 90 tablet 3    warfarin (COUMADIN) 1 MG tablet Take 2 mg by mouth daily      metoprolol tartrate (LOPRESSOR) 25 MG tablet Take 0.5 tablets by mouth 2 times daily 45 tablet 3    amLODIPine (NORVASC) 10 MG tablet TAKE 1 TABLET NIGHTLY 90 tablet 3    fluticasone-salmeterol (ADVAIR HFA) scar,no stasis dermatitis   Neurologic  alert oriented times 3,Cranial nerves II through XII are grossly intact. There were no gross focal neurologic abnormalities. All sensory and motor nerves examined and were normal  Psychiatric: normal mood and affect    Lab Results   Component Value Date    CKTOTAL 50 04/27/2018    TROPONINI 0.020 12/04/2012     BNP:    Lab Results   Component Value Date    BNP 83.4 10/18/2010     PT/INR:  No results found for: PTINR  Lab Results   Component Value Date    LABA1C 6.5 04/27/2018    LABA1C 5.6 01/29/2018     Lab Results   Component Value Date    CHOL 126 05/31/2018    TRIG 90 05/31/2018    HDL 49 05/31/2018    LDLCALC 59 05/31/2018    LDLDIRECT 141 (H) 05/23/2017     Lab Results   Component Value Date    ALT 11 05/31/2018    AST 13 (L) 05/31/2018     TSH:    Lab Results   Component Value Date    TSH 4.05 04/27/2018     PACER/ICD  INTERROGATION      LIFE/VOLTAGE:ADEQUATE  A FIB:VVIR mode  ATRIAL PACING: NO  VENTRICULAR PACING:YES  SHOCKS:No  ALL lead thresholds, impedence and functions are normal  Recommend to continue routine evaluation      Impression:  Pearlean Habermann is a [de-identified] y. o.year old who  has a past medical history of Acid reflux; Adenomatous polyp of colon; Anticoagulated; Arthritis; CAD (coronary artery disease); Chronic atrial fibrillation (HCC); COPD (chronic obstructive pulmonary disease) ([facility] [address] Diverticulitis; Diverticulosis of colon; Enlarged prostate; H/O cardiac catheterization; H/O cardiovascular stress test; H/O cardiovascular stress test; H/O cardiovascular stress test; H/O Doppler ultrasound; H/O echocardiogram; H/O echocardiogram; H/O percutaneous left heart catheterization; H/O unstable angina; History of Doppler ultrasound; History of Doppler ultrasound; History of Holter monitoring; Hx of CT scan of chest; HX OTHER MEDICAL; Hyperlipidemia; Hypertension; Kidney stones; Nephrolithiasis; Nocturia;  Pacemaker; Peptic ulcer, unspecified site, unspecified as acute or chronic, without mention of hemorrhage or perforation; Pneumonia; Shortness of breath; Skin Cancer Arms And Ears; Sleep apnea; Stented coronary artery; Teeth missing; Wears dentures; and Wears glasses. and presents with     Plan:  1. CAD:stable Continue aspirin, statins were discontinued as it was causing myalgia, continue ARB and, betablockers  2. Paroxysmal afib: on coumadin  3. PPM: checked, normal function. 4. Thigh muscle ache and burning: MRI of lumbar spine to r.o spinal stenosis  5. Dyslipidemia:patient could not tolerate 4 statins, on repatha now  6. HTN: stable, continue lopressor and losartan  7. COPD: stable, alreadty quit smokingHealth maintenance: exerise and diet  All labs, medications and tests reviewed, continue all other medications of all above medical condition listed as is.     @[employer]@

## 2018-08-30 DIAGNOSIS — E11.69 TYPE 2 DIABETES MELLITUS WITH OTHER SPECIFIED COMPLICATION, UNSPECIFIED WHETHER LONG TERM INSULIN USE (HCC): ICD-10-CM

## 2018-08-30 DIAGNOSIS — E78.2 MIXED HYPERLIPIDEMIA: Primary | ICD-10-CM

## 2018-08-30 DIAGNOSIS — I10 ESSENTIAL HYPERTENSION: ICD-10-CM

## 2018-08-30 DIAGNOSIS — E03.4 HYPOTHYROIDISM DUE TO ACQUIRED ATROPHY OF THYROID: ICD-10-CM

## 2018-08-31 ENCOUNTER — NURSE ONLY (OUTPATIENT)
Dept: INTERNAL MEDICINE CLINIC | Age: 78
End: 2018-08-31

## 2018-08-31 DIAGNOSIS — Z79.01 LONG TERM CURRENT USE OF ANTICOAGULANT THERAPY: ICD-10-CM

## 2018-08-31 DIAGNOSIS — I48.20 CHRONIC ATRIAL FIBRILLATION (HCC): ICD-10-CM

## 2018-08-31 LAB
INTERNATIONAL NORMALIZATION RATIO, POC: 1.8
PROTHROMBIN TIME, POC: ABNORMAL

## 2018-08-31 PROCEDURE — 85610 PROTHROMBIN TIME: CPT | Performed by: INTERNAL MEDICINE

## 2018-08-31 NOTE — PROGRESS NOTES
Candice Salcedo presented to the office to have fingerstick INR performed-used left hand middle finger with good blood return and tolerated well-results will be forwarded to Dr Niharika Wilson for review and directions

## 2018-09-06 DIAGNOSIS — E78.2 MIXED HYPERLIPIDEMIA: ICD-10-CM

## 2018-09-06 DIAGNOSIS — I10 ESSENTIAL HYPERTENSION: ICD-10-CM

## 2018-09-06 DIAGNOSIS — E11.69 TYPE 2 DIABETES MELLITUS WITH OTHER SPECIFIED COMPLICATION, UNSPECIFIED WHETHER LONG TERM INSULIN USE (HCC): ICD-10-CM

## 2018-09-06 DIAGNOSIS — E03.4 HYPOTHYROIDISM DUE TO ACQUIRED ATROPHY OF THYROID: ICD-10-CM

## 2018-09-06 LAB
ALBUMIN SERPL-MCNC: 4.4 G/DL (ref 3.4–5)
ALP BLD-CCNC: 138 U/L (ref 40–129)
ALT SERPL-CCNC: 17 U/L (ref 10–40)
ANION GAP SERPL CALCULATED.3IONS-SCNC: 13 MMOL/L (ref 3–16)
AST SERPL-CCNC: 18 U/L (ref 15–37)
BASOPHILS ABSOLUTE: 0 K/UL (ref 0–0.2)
BASOPHILS RELATIVE PERCENT: 0.5 %
BILIRUB SERPL-MCNC: 0.6 MG/DL (ref 0–1)
BILIRUBIN DIRECT: <0.2 MG/DL (ref 0–0.3)
BILIRUBIN, INDIRECT: ABNORMAL MG/DL (ref 0–1)
BUN BLDV-MCNC: 17 MG/DL (ref 7–20)
CALCIUM SERPL-MCNC: 9.5 MG/DL (ref 8.3–10.6)
CHLORIDE BLD-SCNC: 104 MMOL/L (ref 99–110)
CHOLESTEROL, TOTAL: 133 MG/DL (ref 0–199)
CO2: 27 MMOL/L (ref 21–32)
CREAT SERPL-MCNC: 1.3 MG/DL (ref 0.8–1.3)
EOSINOPHILS ABSOLUTE: 0.1 K/UL (ref 0–0.6)
EOSINOPHILS RELATIVE PERCENT: 1.5 %
GFR AFRICAN AMERICAN: >60
GFR NON-AFRICAN AMERICAN: 53
GLUCOSE BLD-MCNC: 92 MG/DL (ref 70–99)
HCT VFR BLD CALC: 51.2 % (ref 40.5–52.5)
HDLC SERPL-MCNC: 51 MG/DL (ref 40–60)
HEMOGLOBIN: 16.9 G/DL (ref 13.5–17.5)
LDL CHOLESTEROL CALCULATED: 56 MG/DL
LYMPHOCYTES ABSOLUTE: 2 K/UL (ref 1–5.1)
LYMPHOCYTES RELATIVE PERCENT: 22.4 %
MCH RBC QN AUTO: 32 PG (ref 26–34)
MCHC RBC AUTO-ENTMCNC: 33 G/DL (ref 31–36)
MCV RBC AUTO: 97 FL (ref 80–100)
MONOCYTES ABSOLUTE: 0.9 K/UL (ref 0–1.3)
MONOCYTES RELATIVE PERCENT: 10.3 %
NEUTROPHILS ABSOLUTE: 5.9 K/UL (ref 1.7–7.7)
NEUTROPHILS RELATIVE PERCENT: 65.3 %
PDW BLD-RTO: 15.4 % (ref 12.4–15.4)
PLATELET # BLD: 198 K/UL (ref 135–450)
PMV BLD AUTO: 9.7 FL (ref 5–10.5)
POTASSIUM SERPL-SCNC: 5.1 MMOL/L (ref 3.5–5.1)
RBC # BLD: 5.27 M/UL (ref 4.2–5.9)
SODIUM BLD-SCNC: 144 MMOL/L (ref 136–145)
TOTAL CK: 42 U/L (ref 39–308)
TOTAL PROTEIN: 7.1 G/DL (ref 6.4–8.2)
TRIGL SERPL-MCNC: 131 MG/DL (ref 0–150)
TSH SERPL DL<=0.05 MIU/L-ACNC: 5.37 UIU/ML (ref 0.27–4.2)
VLDLC SERPL CALC-MCNC: 26 MG/DL
WBC # BLD: 9.1 K/UL (ref 4–11)

## 2018-09-07 LAB
ESTIMATED AVERAGE GLUCOSE: 151.3 MG/DL
HBA1C MFR BLD: 6.9 %

## 2018-09-13 ENCOUNTER — OFFICE VISIT (OUTPATIENT)
Dept: INTERNAL MEDICINE CLINIC | Age: 78
End: 2018-09-13

## 2018-09-13 VITALS — SYSTOLIC BLOOD PRESSURE: 104 MMHG | DIASTOLIC BLOOD PRESSURE: 52 MMHG | HEART RATE: 80 BPM

## 2018-09-13 DIAGNOSIS — I10 ESSENTIAL HYPERTENSION: ICD-10-CM

## 2018-09-13 DIAGNOSIS — I48.20 CHRONIC ATRIAL FIBRILLATION (HCC): ICD-10-CM

## 2018-09-13 DIAGNOSIS — G20 PARKINSON DISEASE (HCC): ICD-10-CM

## 2018-09-13 DIAGNOSIS — I25.10 ASHD (ARTERIOSCLEROTIC HEART DISEASE): Primary | ICD-10-CM

## 2018-09-13 DIAGNOSIS — E78.2 MIXED HYPERLIPIDEMIA: ICD-10-CM

## 2018-09-13 DIAGNOSIS — R74.8 ELEVATED ALKALINE PHOSPHATASE IN NEWBORN: ICD-10-CM

## 2018-09-13 DIAGNOSIS — J44.9 COPD, SEVERE (HCC): ICD-10-CM

## 2018-09-13 PROCEDURE — 4040F PNEUMOC VAC/ADMIN/RCVD: CPT | Performed by: INTERNAL MEDICINE

## 2018-09-13 PROCEDURE — G8598 ASA/ANTIPLAT THER USED: HCPCS | Performed by: INTERNAL MEDICINE

## 2018-09-13 PROCEDURE — G8427 DOCREV CUR MEDS BY ELIG CLIN: HCPCS | Performed by: INTERNAL MEDICINE

## 2018-09-13 PROCEDURE — G8926 SPIRO NO PERF OR DOC: HCPCS | Performed by: INTERNAL MEDICINE

## 2018-09-13 PROCEDURE — 1101F PT FALLS ASSESS-DOCD LE1/YR: CPT | Performed by: INTERNAL MEDICINE

## 2018-09-13 PROCEDURE — G8417 CALC BMI ABV UP PARAM F/U: HCPCS | Performed by: INTERNAL MEDICINE

## 2018-09-13 PROCEDURE — 1123F ACP DISCUSS/DSCN MKR DOCD: CPT | Performed by: INTERNAL MEDICINE

## 2018-09-13 PROCEDURE — 4004F PT TOBACCO SCREEN RCVD TLK: CPT | Performed by: INTERNAL MEDICINE

## 2018-09-13 PROCEDURE — 99214 OFFICE O/P EST MOD 30 MIN: CPT | Performed by: INTERNAL MEDICINE

## 2018-09-13 PROCEDURE — 3023F SPIROM DOC REV: CPT | Performed by: INTERNAL MEDICINE

## 2018-09-17 LAB
ALK PHOS OTHER CALC: 0 U/L
ALK PHOSPHATASE: 132 U/L (ref 40–120)
ALKALINE PHOSPHATASE BONE FRACTION: 29 U/L (ref 0–55)
ALKALINE PHOSPHATASE LIVER FRACTION: 103 U/L (ref 0–94)

## 2018-09-24 NOTE — PROGRESS NOTES
S: Patient presents with problems of COPD, atrial fib on coumadin, AV shannan ablation s/p pacer, HTN, hyperlipidemia, ASHD s/p PTCA & stenting of OM1, and sleep apnea. After being intolerant to multiple statins he was placed on Repatha. He is tolerating this and following his low fat diet. No headache, chest pain, or breathing difficulties. He is still smoking and understands the need to stop. No spontaneous bleeding or bruising from his coumadin therapy. His right leg tremor has been diagnosed as Parkinson's. He is now taking Sinemet CR  1 bid. O:Blood pressure (!) 104/52, pulse 80. standing 116/70      HEENT: TMs and canals were clear, oral pharynx clear       Neck: No palpable lymph nodes, normal thyroid examination.       Lungs: Diffuse decreased BS, no wheezing       Cardio: irreg pulse      Ext: no edema          Labs: from 9/6/18 CBC normal, Lytes normal, BUN 17 Creat 1.3, Glucose 92, Liver normal except Alk Phos 138, CK 42, Hgba1c 6.9%, LDL 56 HDL 51 Trig 131, TSH 5.37, from 8/31/18 INR 1.8     A: ASHD s/p PTCA & stenting of OM1 on 5/12/16      Hypertension controlled      COPD stable       Smoking       Chronic atrial fib on coumadin       Intolerance to Lipitor, Livalo, & Zetia        Hyperlipidemia controlled with Repatha        Parkinson's now on Sinemet     P: copy of labs given       Continue present medications       Encourage to stop smoking       Alk Phos Isoenzymes and call results        INR on 5/31/18       OV in 2M with basic chem Hgba1c lipid liver cpk cbc tsh

## 2018-09-25 ENCOUNTER — NURSE ONLY (OUTPATIENT)
Dept: INTERNAL MEDICINE CLINIC | Age: 78
End: 2018-09-25
Payer: COMMERCIAL

## 2018-09-25 DIAGNOSIS — Z79.01 LONG TERM CURRENT USE OF ANTICOAGULANT THERAPY: ICD-10-CM

## 2018-09-25 DIAGNOSIS — I48.20 CHRONIC ATRIAL FIBRILLATION (HCC): ICD-10-CM

## 2018-09-25 LAB
INTERNATIONAL NORMALIZATION RATIO, POC: 1.9
PROTHROMBIN TIME, POC: ABNORMAL

## 2018-09-25 PROCEDURE — 85610 PROTHROMBIN TIME: CPT | Performed by: INTERNAL MEDICINE

## 2018-09-25 NOTE — PROGRESS NOTES
Patient in office today for INR. INR at 1.9, advised pt to continue current dosage and repeat in 2 weeks. Will call patient with any dosage changes or if PCP wants him in sooner than 2 weeks. Patient verbalized understanding.

## 2018-10-23 ENCOUNTER — NURSE ONLY (OUTPATIENT)
Dept: INTERNAL MEDICINE CLINIC | Age: 78
End: 2018-10-23
Payer: COMMERCIAL

## 2018-10-23 DIAGNOSIS — E78.2 MIXED HYPERLIPIDEMIA: ICD-10-CM

## 2018-10-23 DIAGNOSIS — I48.20 CHRONIC ATRIAL FIBRILLATION (HCC): ICD-10-CM

## 2018-10-23 DIAGNOSIS — E11.69 DIABETES MELLITUS ASSOCIATED WITH HORMONAL ETIOLOGY (HCC): ICD-10-CM

## 2018-10-23 DIAGNOSIS — Z79.01 LONG TERM CURRENT USE OF ANTICOAGULANT THERAPY: ICD-10-CM

## 2018-10-23 DIAGNOSIS — E03.4 HYPOTHYROIDISM DUE TO ACQUIRED ATROPHY OF THYROID: ICD-10-CM

## 2018-10-23 DIAGNOSIS — I10 ESSENTIAL HYPERTENSION: Primary | ICD-10-CM

## 2018-10-23 LAB
INTERNATIONAL NORMALIZATION RATIO, POC: 1.7
PROTHROMBIN TIME, POC: ABNORMAL

## 2018-10-23 PROCEDURE — 85610 PROTHROMBIN TIME: CPT | Performed by: INTERNAL MEDICINE

## 2018-10-24 ENCOUNTER — TELEPHONE (OUTPATIENT)
Dept: INTERNAL MEDICINE CLINIC | Age: 78
End: 2018-10-24

## 2018-10-31 ENCOUNTER — HOSPITAL ENCOUNTER (OUTPATIENT)
Age: 78
Discharge: HOME | End: 2018-10-31
Payer: COMMERCIAL

## 2018-10-31 LAB
ALBUMIN SERPL-MCNC: 4.2 GM/DL (ref 3.4–5)
ALP BLD-CCNC: 123 IU/L (ref 40–129)
ALT SERPL-CCNC: 13 U/L (ref 10–40)
ANION GAP SERPL CALCULATED.3IONS-SCNC: 12 MMOL/L (ref 4–16)
AST SERPL-CCNC: 16 IU/L (ref 15–37)
BASOPHILS ABSOLUTE: 0.1 K/CU MM
BASOPHILS RELATIVE PERCENT: 0.6 % (ref 0–1)
BILIRUB SERPL-MCNC: 0.6 MG/DL (ref 0–1)
BILIRUBIN DIRECT: 0.2 MG/DL (ref 0–0.3)
BILIRUBIN, INDIRECT: 0.4 MG/DL (ref 0–0.7)
BUN BLDV-MCNC: 21 MG/DL (ref 6–23)
CALCIUM SERPL-MCNC: 9 MG/DL (ref 8.3–10.6)
CHLORIDE BLD-SCNC: 106 MMOL/L (ref 99–110)
CHOLESTEROL: 170 MG/DL
CO2: 25 MMOL/L (ref 21–32)
CREAT SERPL-MCNC: 1.3 MG/DL (ref 0.9–1.3)
DIFFERENTIAL TYPE: ABNORMAL
EOSINOPHILS ABSOLUTE: 0.2 K/CU MM
EOSINOPHILS RELATIVE PERCENT: 2.4 % (ref 0–3)
ESTIMATED AVERAGE GLUCOSE: 128 MG/DL
GFR AFRICAN AMERICAN: >60 ML/MIN/1.73M2
GFR NON-AFRICAN AMERICAN: 53 ML/MIN/1.73M2
GLUCOSE BLD-MCNC: 99 MG/DL (ref 70–99)
HBA1C MFR BLD: 6.1 % (ref 4.2–6.3)
HCT VFR BLD CALC: 50 % (ref 42–52)
HDLC SERPL-MCNC: 52 MG/DL
HEMOGLOBIN: 16.6 GM/DL (ref 13.5–18)
IMMATURE NEUTROPHIL %: 0.2 % (ref 0–0.43)
INR BLD: 1.95 INDEX
LDL CHOLESTEROL DIRECT: 101 MG/DL
LYMPHOCYTES ABSOLUTE: 2 K/CU MM
LYMPHOCYTES RELATIVE PERCENT: 22.9 % (ref 24–44)
MCH RBC QN AUTO: 32.7 PG (ref 27–31)
MCHC RBC AUTO-ENTMCNC: 33.2 % (ref 32–36)
MCV RBC AUTO: 98.6 FL (ref 78–100)
MONOCYTES ABSOLUTE: 0.9 K/CU MM
MONOCYTES RELATIVE PERCENT: 10.7 % (ref 0–4)
NUCLEATED RBC %: 0 %
PDW BLD-RTO: 14.9 % (ref 11.7–14.9)
PLATELET # BLD: 218 K/CU MM (ref 140–440)
PMV BLD AUTO: 11.5 FL (ref 7.5–11.1)
POTASSIUM SERPL-SCNC: 4.5 MMOL/L (ref 3.5–5.1)
PROTHROMBIN TIME: 22.1 SECONDS (ref 9.12–12.5)
RBC # BLD: 5.07 M/CU MM (ref 4.6–6.2)
SEGMENTED NEUTROPHILS ABSOLUTE COUNT: 5.5 K/CU MM
SEGMENTED NEUTROPHILS RELATIVE PERCENT: 63.2 % (ref 36–66)
SODIUM BLD-SCNC: 143 MMOL/L (ref 135–145)
TOTAL CK: 61 IU/L (ref 38–174)
TOTAL IMMATURE NEUTOROPHIL: 0.02 K/CU MM
TOTAL NUCLEATED RBC: 0 K/CU MM
TOTAL PROTEIN: 6.7 GM/DL (ref 6.4–8.2)
TRIGL SERPL-MCNC: 149 MG/DL
TSH HIGH SENSITIVITY: 3.46 UIU/ML (ref 0.27–4.2)
WBC # BLD: 8.7 K/CU MM (ref 4–10.5)

## 2018-10-31 PROCEDURE — 83036 HEMOGLOBIN GLYCOSYLATED A1C: CPT

## 2018-10-31 PROCEDURE — 82248 BILIRUBIN DIRECT: CPT

## 2018-10-31 PROCEDURE — 83721 ASSAY OF BLOOD LIPOPROTEIN: CPT

## 2018-10-31 PROCEDURE — 80061 LIPID PANEL: CPT

## 2018-10-31 PROCEDURE — 85025 COMPLETE CBC W/AUTO DIFF WBC: CPT

## 2018-10-31 PROCEDURE — 85610 PROTHROMBIN TIME: CPT

## 2018-10-31 PROCEDURE — 84443 ASSAY THYROID STIM HORMONE: CPT

## 2018-10-31 PROCEDURE — 80053 COMPREHEN METABOLIC PANEL: CPT

## 2018-10-31 PROCEDURE — 82550 ASSAY OF CK (CPK): CPT

## 2018-10-31 PROCEDURE — 36415 COLL VENOUS BLD VENIPUNCTURE: CPT

## 2018-11-05 DIAGNOSIS — E78.2 MIXED HYPERLIPIDEMIA: ICD-10-CM

## 2018-11-05 DIAGNOSIS — Z79.01 LONG TERM CURRENT USE OF ANTICOAGULANT THERAPY: ICD-10-CM

## 2018-11-05 DIAGNOSIS — E03.4 HYPOTHYROIDISM DUE TO ACQUIRED ATROPHY OF THYROID: ICD-10-CM

## 2018-11-05 DIAGNOSIS — I48.20 CHRONIC ATRIAL FIBRILLATION (HCC): ICD-10-CM

## 2018-11-05 DIAGNOSIS — E11.69 TYPE 2 DIABETES MELLITUS WITH OTHER SPECIFIED COMPLICATION, UNSPECIFIED WHETHER LONG TERM INSULIN USE (HCC): Primary | ICD-10-CM

## 2018-11-05 DIAGNOSIS — I10 ESSENTIAL HYPERTENSION: ICD-10-CM

## 2018-11-14 ENCOUNTER — OFFICE VISIT (OUTPATIENT)
Dept: INTERNAL MEDICINE CLINIC | Age: 78
End: 2018-11-14
Payer: COMMERCIAL

## 2018-11-14 VITALS
HEART RATE: 72 BPM | RESPIRATION RATE: 16 BRPM | DIASTOLIC BLOOD PRESSURE: 60 MMHG | BODY MASS INDEX: 30.13 KG/M2 | SYSTOLIC BLOOD PRESSURE: 120 MMHG | WEIGHT: 210 LBS | OXYGEN SATURATION: 98 %

## 2018-11-14 DIAGNOSIS — I48.20 CHRONIC ATRIAL FIBRILLATION (HCC): ICD-10-CM

## 2018-11-14 DIAGNOSIS — E78.2 MIXED HYPERLIPIDEMIA: ICD-10-CM

## 2018-11-14 DIAGNOSIS — I10 ESSENTIAL HYPERTENSION: ICD-10-CM

## 2018-11-14 DIAGNOSIS — R25.1 TREMOR, UNSPECIFIED: ICD-10-CM

## 2018-11-14 DIAGNOSIS — Z23 NEED FOR INFLUENZA VACCINATION: ICD-10-CM

## 2018-11-14 DIAGNOSIS — E74.39 GLUCOSE INTOLERANCE: ICD-10-CM

## 2018-11-14 DIAGNOSIS — I25.10 ASHD (ARTERIOSCLEROTIC HEART DISEASE): ICD-10-CM

## 2018-11-14 DIAGNOSIS — J44.9 CHRONIC OBSTRUCTIVE PULMONARY DISEASE, UNSPECIFIED COPD TYPE (HCC): Primary | ICD-10-CM

## 2018-11-14 DIAGNOSIS — Z23 NEED FOR PROPHYLACTIC VACCINATION AND INOCULATION AGAINST VARICELLA: ICD-10-CM

## 2018-11-14 PROCEDURE — 99214 OFFICE O/P EST MOD 30 MIN: CPT | Performed by: INTERNAL MEDICINE

## 2018-11-14 PROCEDURE — 1101F PT FALLS ASSESS-DOCD LE1/YR: CPT | Performed by: INTERNAL MEDICINE

## 2018-11-14 PROCEDURE — 4040F PNEUMOC VAC/ADMIN/RCVD: CPT | Performed by: INTERNAL MEDICINE

## 2018-11-14 PROCEDURE — G0008 ADMIN INFLUENZA VIRUS VAC: HCPCS | Performed by: INTERNAL MEDICINE

## 2018-11-14 PROCEDURE — G8926 SPIRO NO PERF OR DOC: HCPCS | Performed by: INTERNAL MEDICINE

## 2018-11-14 PROCEDURE — G8482 FLU IMMUNIZE ORDER/ADMIN: HCPCS | Performed by: INTERNAL MEDICINE

## 2018-11-14 PROCEDURE — 4004F PT TOBACCO SCREEN RCVD TLK: CPT | Performed by: INTERNAL MEDICINE

## 2018-11-14 PROCEDURE — G8417 CALC BMI ABV UP PARAM F/U: HCPCS | Performed by: INTERNAL MEDICINE

## 2018-11-14 PROCEDURE — 1123F ACP DISCUSS/DSCN MKR DOCD: CPT | Performed by: INTERNAL MEDICINE

## 2018-11-14 PROCEDURE — 90662 IIV NO PRSV INCREASED AG IM: CPT | Performed by: INTERNAL MEDICINE

## 2018-11-14 PROCEDURE — G8598 ASA/ANTIPLAT THER USED: HCPCS | Performed by: INTERNAL MEDICINE

## 2018-11-14 PROCEDURE — G8427 DOCREV CUR MEDS BY ELIG CLIN: HCPCS | Performed by: INTERNAL MEDICINE

## 2018-11-14 PROCEDURE — 3023F SPIROM DOC REV: CPT | Performed by: INTERNAL MEDICINE

## 2018-11-14 RX ORDER — CLONAZEPAM 0.5 MG/1
TABLET ORAL
Refills: 1 | COMMUNITY
Start: 2018-10-12 | End: 2018-11-26 | Stop reason: SDUPTHER

## 2018-11-26 DIAGNOSIS — F41.9 ANXIETY: Primary | ICD-10-CM

## 2018-11-26 NOTE — LETTER
supplements and oral decongestants. Dependence withdrawal symptoms may include depressed mood, loss of interest, suicidal thoughts, anxiety, fatigue, appetite changes and agitation. Testosterone replacement therapy:  Potential side effects include increased risk of stroke and heart attack, blood clots, increased blood pressure, increased cholesterol, enlarged prostate, sleep apnea, irritability/aggression and other mood disorders, and decreased fertility. Other:     1. I understand that I have the following responsibilities:  · I will take medications at the dose and frequency prescribed. · I will not increase or change how I take my medications without the approval of the health care provider who signs this Medication Agreement. · I will arrange for refills at the prescribed interval ONLY during regular office hours. I will not ask for refills earlier than agreed, after-hours, on holidays or on weekends. · I will obtain all refills for these medications at  ·  ____________________________________  pharmacy (phone number  ·  ________________________), with full consent for my provider and pharmacist to exchange information in writing or verbally. · I will not request any pain medications or controlled substances from other providers and will inform this provider of all other medications I am taking. · I will inform my other health care providers that I am taking these medications and of the existence of this [address]. In the event of an emergency, I will provide the same information to the emergency department providers. · I will protect my prescriptions and medications. I understand that lost or misplaced prescriptions will not be replaced. · I will keep medications only for my own use and will not share them with others. I will keep all medications away from children. · I agree to participate in any medical, psychological or psychiatric assessments recommended by my provider. which may also result in my being prevented from receiving further care from this office. · Other:____________________________________________________________________    AGREEMENT:    I have read the above and have had all of my questions answered. For chronic disease management, I know that my symptoms can be managed with many types of treatments. A chronic medication trial may be part of my treatment, but I must be an active participant in my care. Medication therapy is only one part of my symptom management plan. In some cases, there may be limited scientific evidence to support the chronic use of certain medications to improve symptoms and daily function. Furthermore, in certain circumstances, there may be scientific information that suggests that use of chronic controlled substances may actually worsen my symptoms and increase my risk of unintentional death directly related to this medication therapy. I know that if my provider feels my risk from controlled medications is greater than my benefit, I will have my controlled substance medication(s) compassionately lowered or removed altogether. I agree to a controlled substance medication trial.      I further agree to allow this office to contact family or friends if there are concerns about my safety and use of the controlled medications. I have agreed to use the following medications above as instructed by my physician and as stated in this [address].      Patient Signature:  ______________________  Date:12/5/2018 or _____________    Provider Signature:______________________  IMKW:09/2/5660 or _____________

## 2018-11-29 ENCOUNTER — PROCEDURE VISIT (OUTPATIENT)
Dept: CARDIOLOGY CLINIC | Age: 78
End: 2018-11-29
Payer: COMMERCIAL

## 2018-11-29 VITALS — HEART RATE: 70 BPM | DIASTOLIC BLOOD PRESSURE: 72 MMHG | SYSTOLIC BLOOD PRESSURE: 126 MMHG

## 2018-11-29 DIAGNOSIS — Z95.0 CARDIAC PACEMAKER IN SITU: Primary | ICD-10-CM

## 2018-11-29 PROCEDURE — 93279 PRGRMG DEV EVAL PM/LDLS PM: CPT | Performed by: INTERNAL MEDICINE

## 2018-12-05 ENCOUNTER — NURSE ONLY (OUTPATIENT)
Dept: INTERNAL MEDICINE CLINIC | Age: 78
End: 2018-12-05
Payer: COMMERCIAL

## 2018-12-05 DIAGNOSIS — I48.20 CHRONIC ATRIAL FIBRILLATION (HCC): ICD-10-CM

## 2018-12-05 DIAGNOSIS — Z79.01 LONG TERM CURRENT USE OF ANTICOAGULANT THERAPY: ICD-10-CM

## 2018-12-05 LAB
INTERNATIONAL NORMALIZATION RATIO, POC: 2
PROTHROMBIN TIME, POC: NORMAL

## 2018-12-05 PROCEDURE — 85610 PROTHROMBIN TIME: CPT | Performed by: INTERNAL MEDICINE

## 2018-12-05 RX ORDER — LEVOTHYROXINE SODIUM 0.03 MG/1
25 TABLET ORAL DAILY
Qty: 90 TABLET | Refills: 3 | Status: SHIPPED | OUTPATIENT
Start: 2018-12-05 | End: 2019-12-07 | Stop reason: SDUPTHER

## 2018-12-05 RX ORDER — CLONAZEPAM 0.5 MG/1
TABLET ORAL
Qty: 90 TABLET | Refills: 0 | Status: SHIPPED | OUTPATIENT
Start: 2018-12-05 | End: 2019-03-06 | Stop reason: SDUPTHER

## 2018-12-05 NOTE — TELEPHONE ENCOUNTER
Controlled Substances Monitoring:     RX Monitoring 12/5/2018   Attestation The Prescription Monitoring Report for this patient was reviewed today. Documentation No signs of potential drug abuse or diversion identified.

## 2018-12-26 ENCOUNTER — NURSE ONLY (OUTPATIENT)
Dept: INTERNAL MEDICINE CLINIC | Age: 78
End: 2018-12-26
Payer: COMMERCIAL

## 2018-12-26 DIAGNOSIS — I48.20 CHRONIC ATRIAL FIBRILLATION (HCC): ICD-10-CM

## 2018-12-26 DIAGNOSIS — Z79.01 LONG TERM CURRENT USE OF ANTICOAGULANT THERAPY: ICD-10-CM

## 2018-12-26 DIAGNOSIS — E78.5 DYSLIPIDEMIA: ICD-10-CM

## 2018-12-26 LAB
INTERNATIONAL NORMALIZATION RATIO, POC: 2
PROTHROMBIN TIME, POC: NORMAL

## 2018-12-26 PROCEDURE — 85610 PROTHROMBIN TIME: CPT | Performed by: INTERNAL MEDICINE

## 2018-12-26 NOTE — PROGRESS NOTES
Barbara Ellis presented to the office to have fingerstick INR performed-used left hand middle finger with good blood return and tolerated well-results will be forwarded to Dr Santhosh Elliott for review and directions

## 2019-01-04 ENCOUNTER — TELEPHONE (OUTPATIENT)
Dept: INTERNAL MEDICINE CLINIC | Age: 79
End: 2019-01-04

## 2019-01-04 ENCOUNTER — OFFICE VISIT (OUTPATIENT)
Dept: INTERNAL MEDICINE CLINIC | Age: 79
End: 2019-01-04
Payer: COMMERCIAL

## 2019-01-04 VITALS
BODY MASS INDEX: 31.14 KG/M2 | HEART RATE: 84 BPM | DIASTOLIC BLOOD PRESSURE: 68 MMHG | SYSTOLIC BLOOD PRESSURE: 134 MMHG | WEIGHT: 217 LBS | RESPIRATION RATE: 16 BRPM

## 2019-01-04 DIAGNOSIS — I48.20 CHRONIC ATRIAL FIBRILLATION (HCC): ICD-10-CM

## 2019-01-04 DIAGNOSIS — I10 ESSENTIAL HYPERTENSION: ICD-10-CM

## 2019-01-04 DIAGNOSIS — M50.30 DDD (DEGENERATIVE DISC DISEASE), CERVICAL: ICD-10-CM

## 2019-01-04 DIAGNOSIS — M62.838 CERVICAL PARASPINAL MUSCLE SPASM: Primary | ICD-10-CM

## 2019-01-04 PROCEDURE — G8482 FLU IMMUNIZE ORDER/ADMIN: HCPCS | Performed by: INTERNAL MEDICINE

## 2019-01-04 PROCEDURE — G8427 DOCREV CUR MEDS BY ELIG CLIN: HCPCS | Performed by: INTERNAL MEDICINE

## 2019-01-04 PROCEDURE — 4040F PNEUMOC VAC/ADMIN/RCVD: CPT | Performed by: INTERNAL MEDICINE

## 2019-01-04 PROCEDURE — 4004F PT TOBACCO SCREEN RCVD TLK: CPT | Performed by: INTERNAL MEDICINE

## 2019-01-04 PROCEDURE — 99213 OFFICE O/P EST LOW 20 MIN: CPT | Performed by: INTERNAL MEDICINE

## 2019-01-04 PROCEDURE — G8417 CALC BMI ABV UP PARAM F/U: HCPCS | Performed by: INTERNAL MEDICINE

## 2019-01-04 PROCEDURE — G8598 ASA/ANTIPLAT THER USED: HCPCS | Performed by: INTERNAL MEDICINE

## 2019-01-04 PROCEDURE — 1101F PT FALLS ASSESS-DOCD LE1/YR: CPT | Performed by: INTERNAL MEDICINE

## 2019-01-04 PROCEDURE — 1123F ACP DISCUSS/DSCN MKR DOCD: CPT | Performed by: INTERNAL MEDICINE

## 2019-01-04 RX ORDER — BACLOFEN 10 MG/1
10 TABLET ORAL 2 TIMES DAILY PRN
Qty: 30 TABLET | Refills: 1 | Status: SHIPPED | OUTPATIENT
Start: 2019-01-04 | End: 2019-01-04 | Stop reason: SDUPTHER

## 2019-01-04 RX ORDER — METHYLPREDNISOLONE 4 MG/1
TABLET ORAL
Qty: 1 KIT | Refills: 0 | Status: SHIPPED | OUTPATIENT
Start: 2019-01-04 | End: 2019-01-04 | Stop reason: SDUPTHER

## 2019-01-04 RX ORDER — BACLOFEN 10 MG/1
10 TABLET ORAL 2 TIMES DAILY PRN
Qty: 30 TABLET | Refills: 1 | Status: SHIPPED | OUTPATIENT
Start: 2019-01-04 | End: 2019-12-05

## 2019-01-04 RX ORDER — METHYLPREDNISOLONE 4 MG/1
TABLET ORAL
Qty: 1 KIT | Refills: 0 | Status: SHIPPED | OUTPATIENT
Start: 2019-01-04 | End: 2019-01-10

## 2019-01-15 ENCOUNTER — OFFICE VISIT (OUTPATIENT)
Dept: INTERNAL MEDICINE CLINIC | Age: 79
End: 2019-01-15
Payer: COMMERCIAL

## 2019-01-15 VITALS — RESPIRATION RATE: 15 BRPM | DIASTOLIC BLOOD PRESSURE: 74 MMHG | HEART RATE: 76 BPM | SYSTOLIC BLOOD PRESSURE: 124 MMHG

## 2019-01-15 DIAGNOSIS — R73.09 ELEVATED HEMOGLOBIN A1C: ICD-10-CM

## 2019-01-15 DIAGNOSIS — E74.39 GLUCOSE INTOLERANCE: ICD-10-CM

## 2019-01-15 DIAGNOSIS — I48.20 CHRONIC ATRIAL FIBRILLATION (HCC): ICD-10-CM

## 2019-01-15 DIAGNOSIS — E78.2 MIXED HYPERLIPIDEMIA: ICD-10-CM

## 2019-01-15 DIAGNOSIS — I10 ESSENTIAL HYPERTENSION: ICD-10-CM

## 2019-01-15 DIAGNOSIS — J44.9 COPD, SEVERE (HCC): Primary | ICD-10-CM

## 2019-01-15 PROCEDURE — G8598 ASA/ANTIPLAT THER USED: HCPCS | Performed by: INTERNAL MEDICINE

## 2019-01-15 PROCEDURE — G8417 CALC BMI ABV UP PARAM F/U: HCPCS | Performed by: INTERNAL MEDICINE

## 2019-01-15 PROCEDURE — 4040F PNEUMOC VAC/ADMIN/RCVD: CPT | Performed by: INTERNAL MEDICINE

## 2019-01-15 PROCEDURE — G8427 DOCREV CUR MEDS BY ELIG CLIN: HCPCS | Performed by: INTERNAL MEDICINE

## 2019-01-15 PROCEDURE — 1101F PT FALLS ASSESS-DOCD LE1/YR: CPT | Performed by: INTERNAL MEDICINE

## 2019-01-15 PROCEDURE — 4004F PT TOBACCO SCREEN RCVD TLK: CPT | Performed by: INTERNAL MEDICINE

## 2019-01-15 PROCEDURE — 99214 OFFICE O/P EST MOD 30 MIN: CPT | Performed by: INTERNAL MEDICINE

## 2019-01-15 PROCEDURE — G8482 FLU IMMUNIZE ORDER/ADMIN: HCPCS | Performed by: INTERNAL MEDICINE

## 2019-01-15 PROCEDURE — 3023F SPIROM DOC REV: CPT | Performed by: INTERNAL MEDICINE

## 2019-01-15 PROCEDURE — G8926 SPIRO NO PERF OR DOC: HCPCS | Performed by: INTERNAL MEDICINE

## 2019-01-15 PROCEDURE — 1123F ACP DISCUSS/DSCN MKR DOCD: CPT | Performed by: INTERNAL MEDICINE

## 2019-01-16 ENCOUNTER — ANTI-COAG VISIT (OUTPATIENT)
Dept: INTERNAL MEDICINE CLINIC | Age: 79
End: 2019-01-16

## 2019-01-16 DIAGNOSIS — R73.09 ELEVATED HEMOGLOBIN A1C: ICD-10-CM

## 2019-01-16 DIAGNOSIS — E78.2 MIXED HYPERLIPIDEMIA: ICD-10-CM

## 2019-01-16 DIAGNOSIS — I10 ESSENTIAL HYPERTENSION: ICD-10-CM

## 2019-01-16 DIAGNOSIS — I48.20 CHRONIC ATRIAL FIBRILLATION (HCC): ICD-10-CM

## 2019-01-16 LAB
ALBUMIN SERPL-MCNC: 4.2 G/DL (ref 3.4–5)
ALP BLD-CCNC: 107 U/L (ref 40–129)
ALT SERPL-CCNC: 19 U/L (ref 10–40)
ANION GAP SERPL CALCULATED.3IONS-SCNC: 15 MMOL/L (ref 3–16)
AST SERPL-CCNC: 16 U/L (ref 15–37)
BILIRUB SERPL-MCNC: 0.6 MG/DL (ref 0–1)
BILIRUBIN DIRECT: <0.2 MG/DL (ref 0–0.3)
BILIRUBIN, INDIRECT: NORMAL MG/DL (ref 0–1)
BUN BLDV-MCNC: 19 MG/DL (ref 7–20)
CALCIUM SERPL-MCNC: 9.2 MG/DL (ref 8.3–10.6)
CHLORIDE BLD-SCNC: 103 MMOL/L (ref 99–110)
CHOLESTEROL, TOTAL: 125 MG/DL (ref 0–199)
CO2: 26 MMOL/L (ref 21–32)
CREAT SERPL-MCNC: 1.3 MG/DL (ref 0.8–1.3)
GFR AFRICAN AMERICAN: >60
GFR NON-AFRICAN AMERICAN: 53
GLUCOSE BLD-MCNC: 100 MG/DL (ref 70–99)
HCT VFR BLD CALC: 49.9 % (ref 40.5–52.5)
HDLC SERPL-MCNC: 54 MG/DL (ref 40–60)
HEMOGLOBIN: 16.5 G/DL (ref 13.5–17.5)
INR BLD: 1.82 (ref 0.86–1.14)
LDL CHOLESTEROL CALCULATED: 45 MG/DL
MCH RBC QN AUTO: 32.4 PG (ref 26–34)
MCHC RBC AUTO-ENTMCNC: 33 G/DL (ref 31–36)
MCV RBC AUTO: 98.2 FL (ref 80–100)
PDW BLD-RTO: 14.1 % (ref 12.4–15.4)
PLATELET # BLD: 205 K/UL (ref 135–450)
PMV BLD AUTO: 9.5 FL (ref 5–10.5)
POTASSIUM SERPL-SCNC: 4.9 MMOL/L (ref 3.5–5.1)
PROTHROMBIN TIME: 20.8 SEC (ref 9.8–13)
RBC # BLD: 5.09 M/UL (ref 4.2–5.9)
SODIUM BLD-SCNC: 144 MMOL/L (ref 136–145)
TOTAL CK: 34 U/L (ref 39–308)
TOTAL PROTEIN: 6.8 G/DL (ref 6.4–8.2)
TRIGL SERPL-MCNC: 129 MG/DL (ref 0–150)
VLDLC SERPL CALC-MCNC: 26 MG/DL
WBC # BLD: 9.9 K/UL (ref 4–11)

## 2019-01-17 LAB
ESTIMATED AVERAGE GLUCOSE: 128.4 MG/DL
HBA1C MFR BLD: 6.1 %

## 2019-02-12 ENCOUNTER — NURSE ONLY (OUTPATIENT)
Dept: INTERNAL MEDICINE CLINIC | Age: 79
End: 2019-02-12
Payer: COMMERCIAL

## 2019-02-12 DIAGNOSIS — Z79.01 LONG TERM CURRENT USE OF ANTICOAGULANT THERAPY: ICD-10-CM

## 2019-02-12 DIAGNOSIS — I48.20 CHRONIC ATRIAL FIBRILLATION (HCC): ICD-10-CM

## 2019-02-12 LAB
INTERNATIONAL NORMALIZATION RATIO, POC: 2
PROTHROMBIN TIME, POC: NORMAL

## 2019-02-12 PROCEDURE — 85610 PROTHROMBIN TIME: CPT | Performed by: INTERNAL MEDICINE

## 2019-02-21 RX ORDER — NIZATIDINE 150 MG/1
CAPSULE ORAL
Qty: 90 CAPSULE | Refills: 3 | Status: SHIPPED | OUTPATIENT
Start: 2019-02-21 | End: 2020-02-18 | Stop reason: SDUPTHER

## 2019-03-01 ENCOUNTER — TELEPHONE (OUTPATIENT)
Dept: CARDIOLOGY CLINIC | Age: 79
End: 2019-03-01

## 2019-03-01 ENCOUNTER — OFFICE VISIT (OUTPATIENT)
Dept: CARDIOLOGY CLINIC | Age: 79
End: 2019-03-01
Payer: COMMERCIAL

## 2019-03-01 ENCOUNTER — PROCEDURE VISIT (OUTPATIENT)
Dept: CARDIOLOGY CLINIC | Age: 79
End: 2019-03-01
Payer: COMMERCIAL

## 2019-03-01 VITALS — DIASTOLIC BLOOD PRESSURE: 80 MMHG | HEART RATE: 70 BPM | SYSTOLIC BLOOD PRESSURE: 134 MMHG

## 2019-03-01 VITALS
SYSTOLIC BLOOD PRESSURE: 134 MMHG | BODY MASS INDEX: 31.78 KG/M2 | HEIGHT: 70 IN | WEIGHT: 222 LBS | HEART RATE: 70 BPM | DIASTOLIC BLOOD PRESSURE: 80 MMHG

## 2019-03-01 DIAGNOSIS — I10 ESSENTIAL HYPERTENSION: Primary | ICD-10-CM

## 2019-03-01 DIAGNOSIS — E78.5 DYSLIPIDEMIA: ICD-10-CM

## 2019-03-01 DIAGNOSIS — Z95.0 CARDIAC PACEMAKER IN SITU: Primary | ICD-10-CM

## 2019-03-01 PROCEDURE — G8427 DOCREV CUR MEDS BY ELIG CLIN: HCPCS | Performed by: INTERNAL MEDICINE

## 2019-03-01 PROCEDURE — G8417 CALC BMI ABV UP PARAM F/U: HCPCS | Performed by: INTERNAL MEDICINE

## 2019-03-01 PROCEDURE — 4004F PT TOBACCO SCREEN RCVD TLK: CPT | Performed by: INTERNAL MEDICINE

## 2019-03-01 PROCEDURE — 93279 PRGRMG DEV EVAL PM/LDLS PM: CPT | Performed by: INTERNAL MEDICINE

## 2019-03-01 PROCEDURE — 1101F PT FALLS ASSESS-DOCD LE1/YR: CPT | Performed by: INTERNAL MEDICINE

## 2019-03-01 PROCEDURE — G8482 FLU IMMUNIZE ORDER/ADMIN: HCPCS | Performed by: INTERNAL MEDICINE

## 2019-03-01 PROCEDURE — 1123F ACP DISCUSS/DSCN MKR DOCD: CPT | Performed by: INTERNAL MEDICINE

## 2019-03-01 PROCEDURE — 99214 OFFICE O/P EST MOD 30 MIN: CPT | Performed by: INTERNAL MEDICINE

## 2019-03-01 PROCEDURE — G8598 ASA/ANTIPLAT THER USED: HCPCS | Performed by: INTERNAL MEDICINE

## 2019-03-01 PROCEDURE — 4040F PNEUMOC VAC/ADMIN/RCVD: CPT | Performed by: INTERNAL MEDICINE

## 2019-03-05 DIAGNOSIS — F41.9 ANXIETY: ICD-10-CM

## 2019-03-06 RX ORDER — CLONAZEPAM 0.5 MG/1
TABLET ORAL
Qty: 90 TABLET | Refills: 0 | Status: SHIPPED | OUTPATIENT
Start: 2019-03-06 | End: 2019-06-11 | Stop reason: SDUPTHER

## 2019-03-11 ENCOUNTER — NURSE ONLY (OUTPATIENT)
Dept: INTERNAL MEDICINE CLINIC | Age: 79
End: 2019-03-11
Payer: COMMERCIAL

## 2019-03-11 DIAGNOSIS — I48.20 CHRONIC ATRIAL FIBRILLATION (HCC): ICD-10-CM

## 2019-03-11 DIAGNOSIS — Z79.01 LONG TERM CURRENT USE OF ANTICOAGULANT THERAPY: ICD-10-CM

## 2019-03-11 LAB
INTERNATIONAL NORMALIZATION RATIO, POC: 1.9
PROTHROMBIN TIME, POC: ABNORMAL

## 2019-03-11 PROCEDURE — 85610 PROTHROMBIN TIME: CPT | Performed by: INTERNAL MEDICINE

## 2019-04-08 ENCOUNTER — NURSE ONLY (OUTPATIENT)
Dept: INTERNAL MEDICINE CLINIC | Age: 79
End: 2019-04-08
Payer: COMMERCIAL

## 2019-04-08 DIAGNOSIS — Z79.01 LONG TERM CURRENT USE OF ANTICOAGULANT THERAPY: ICD-10-CM

## 2019-04-08 DIAGNOSIS — I48.20 CHRONIC ATRIAL FIBRILLATION (HCC): ICD-10-CM

## 2019-04-08 LAB
INTERNATIONAL NORMALIZATION RATIO, POC: 1.7
PROTHROMBIN TIME, POC: ABNORMAL

## 2019-04-08 PROCEDURE — 85610 PROTHROMBIN TIME: CPT | Performed by: INTERNAL MEDICINE

## 2019-04-08 NOTE — PROGRESS NOTES
Patient in office today for INR. INR at 1.7. Advised pt to continue current dosage and repeat with blood draw having at the beginning of May. He verbalized understanding. Will contact patient with any changes.

## 2019-04-17 DIAGNOSIS — E11.69 TYPE 2 DIABETES MELLITUS WITH OTHER SPECIFIED COMPLICATION, UNSPECIFIED WHETHER LONG TERM INSULIN USE (HCC): Primary | ICD-10-CM

## 2019-04-17 DIAGNOSIS — E03.4 HYPOTHYROIDISM DUE TO ACQUIRED ATROPHY OF THYROID: ICD-10-CM

## 2019-04-17 DIAGNOSIS — I48.20 CHRONIC ATRIAL FIBRILLATION (HCC): ICD-10-CM

## 2019-04-17 DIAGNOSIS — I10 ESSENTIAL HYPERTENSION: ICD-10-CM

## 2019-04-17 DIAGNOSIS — E78.2 MIXED HYPERLIPIDEMIA: ICD-10-CM

## 2019-05-06 DIAGNOSIS — I48.20 CHRONIC ATRIAL FIBRILLATION (HCC): ICD-10-CM

## 2019-05-06 DIAGNOSIS — E78.2 MIXED HYPERLIPIDEMIA: ICD-10-CM

## 2019-05-06 DIAGNOSIS — I10 ESSENTIAL HYPERTENSION: ICD-10-CM

## 2019-05-06 DIAGNOSIS — E11.69 TYPE 2 DIABETES MELLITUS WITH OTHER SPECIFIED COMPLICATION, UNSPECIFIED WHETHER LONG TERM INSULIN USE (HCC): ICD-10-CM

## 2019-05-06 DIAGNOSIS — E03.4 HYPOTHYROIDISM DUE TO ACQUIRED ATROPHY OF THYROID: ICD-10-CM

## 2019-05-06 LAB
ALBUMIN SERPL-MCNC: 4.4 G/DL (ref 3.4–5)
ALP BLD-CCNC: 130 U/L (ref 40–129)
ALT SERPL-CCNC: 14 U/L (ref 10–40)
ANION GAP SERPL CALCULATED.3IONS-SCNC: 22 MMOL/L (ref 3–16)
AST SERPL-CCNC: 18 U/L (ref 15–37)
BASOPHILS ABSOLUTE: 0.1 K/UL (ref 0–0.2)
BASOPHILS RELATIVE PERCENT: 0.6 %
BILIRUB SERPL-MCNC: 0.6 MG/DL (ref 0–1)
BILIRUBIN DIRECT: <0.2 MG/DL (ref 0–0.3)
BILIRUBIN, INDIRECT: ABNORMAL MG/DL (ref 0–1)
BUN BLDV-MCNC: 23 MG/DL (ref 7–20)
CALCIUM SERPL-MCNC: 9.9 MG/DL (ref 8.3–10.6)
CHLORIDE BLD-SCNC: 103 MMOL/L (ref 99–110)
CHOLESTEROL, TOTAL: 139 MG/DL (ref 0–199)
CO2: 20 MMOL/L (ref 21–32)
CREAT SERPL-MCNC: 1.7 MG/DL (ref 0.8–1.3)
EOSINOPHILS ABSOLUTE: 0.2 K/UL (ref 0–0.6)
EOSINOPHILS RELATIVE PERCENT: 2.1 %
GFR AFRICAN AMERICAN: 47
GFR NON-AFRICAN AMERICAN: 39
GLUCOSE BLD-MCNC: 102 MG/DL (ref 70–99)
HCT VFR BLD CALC: 49.5 % (ref 40.5–52.5)
HDLC SERPL-MCNC: 56 MG/DL (ref 40–60)
HEMOGLOBIN: 16.4 G/DL (ref 13.5–17.5)
INR BLD: 1.32 (ref 0.86–1.14)
LDL CHOLESTEROL CALCULATED: 65 MG/DL
LYMPHOCYTES ABSOLUTE: 1.9 K/UL (ref 1–5.1)
LYMPHOCYTES RELATIVE PERCENT: 22.6 %
MCH RBC QN AUTO: 32.3 PG (ref 26–34)
MCHC RBC AUTO-ENTMCNC: 33.2 G/DL (ref 31–36)
MCV RBC AUTO: 97.2 FL (ref 80–100)
MONOCYTES ABSOLUTE: 0.9 K/UL (ref 0–1.3)
MONOCYTES RELATIVE PERCENT: 10.2 %
NEUTROPHILS ABSOLUTE: 5.5 K/UL (ref 1.7–7.7)
NEUTROPHILS RELATIVE PERCENT: 64.5 %
PDW BLD-RTO: 15.2 % (ref 12.4–15.4)
PLATELET # BLD: 207 K/UL (ref 135–450)
PMV BLD AUTO: 10.1 FL (ref 5–10.5)
POTASSIUM SERPL-SCNC: 5.2 MMOL/L (ref 3.5–5.1)
PROTHROMBIN TIME: 15.1 SEC (ref 9.8–13)
RBC # BLD: 5.09 M/UL (ref 4.2–5.9)
SODIUM BLD-SCNC: 145 MMOL/L (ref 136–145)
TOTAL CK: 53 U/L (ref 39–308)
TOTAL PROTEIN: 7.2 G/DL (ref 6.4–8.2)
TRIGL SERPL-MCNC: 91 MG/DL (ref 0–150)
TSH SERPL DL<=0.05 MIU/L-ACNC: 3.22 UIU/ML (ref 0.27–4.2)
VLDLC SERPL CALC-MCNC: 18 MG/DL
WBC # BLD: 8.5 K/UL (ref 4–11)

## 2019-05-07 ENCOUNTER — ANTI-COAG VISIT (OUTPATIENT)
Dept: INTERNAL MEDICINE CLINIC | Age: 79
End: 2019-05-07

## 2019-05-07 LAB
ESTIMATED AVERAGE GLUCOSE: 125.5 MG/DL
HBA1C MFR BLD: 6 %

## 2019-05-07 NOTE — PROGRESS NOTES
Spoke with pt, no dosages missed. He is doing 1mg Wednesday & Friday and 2mg all other days. Also taking 81mg aspirin.

## 2019-05-14 ENCOUNTER — OFFICE VISIT (OUTPATIENT)
Dept: INTERNAL MEDICINE CLINIC | Age: 79
End: 2019-05-14
Payer: COMMERCIAL

## 2019-05-14 VITALS
SYSTOLIC BLOOD PRESSURE: 124 MMHG | BODY MASS INDEX: 31.14 KG/M2 | DIASTOLIC BLOOD PRESSURE: 70 MMHG | HEART RATE: 76 BPM | RESPIRATION RATE: 16 BRPM | WEIGHT: 217 LBS

## 2019-05-14 DIAGNOSIS — E78.2 MIXED HYPERLIPIDEMIA: ICD-10-CM

## 2019-05-14 DIAGNOSIS — E11.69 TYPE 2 DIABETES MELLITUS WITH OTHER SPECIFIED COMPLICATION, UNSPECIFIED WHETHER LONG TERM INSULIN USE (HCC): Primary | ICD-10-CM

## 2019-05-14 DIAGNOSIS — N28.9 RENAL DYSFUNCTION: ICD-10-CM

## 2019-05-14 DIAGNOSIS — Z79.01 LONG TERM CURRENT USE OF ANTICOAGULANT THERAPY: ICD-10-CM

## 2019-05-14 DIAGNOSIS — E03.4 HYPOTHYROIDISM DUE TO ACQUIRED ATROPHY OF THYROID: ICD-10-CM

## 2019-05-14 DIAGNOSIS — I48.20 CHRONIC ATRIAL FIBRILLATION (HCC): ICD-10-CM

## 2019-05-14 DIAGNOSIS — I10 ESSENTIAL HYPERTENSION: ICD-10-CM

## 2019-05-14 DIAGNOSIS — J44.9 COPD, SEVERE (HCC): Primary | ICD-10-CM

## 2019-05-14 LAB
INTERNATIONAL NORMALIZATION RATIO, POC: 2
PROTHROMBIN TIME, POC: NORMAL

## 2019-05-14 PROCEDURE — 4004F PT TOBACCO SCREEN RCVD TLK: CPT | Performed by: INTERNAL MEDICINE

## 2019-05-14 PROCEDURE — 3023F SPIROM DOC REV: CPT | Performed by: INTERNAL MEDICINE

## 2019-05-14 PROCEDURE — G8926 SPIRO NO PERF OR DOC: HCPCS | Performed by: INTERNAL MEDICINE

## 2019-05-14 PROCEDURE — G8417 CALC BMI ABV UP PARAM F/U: HCPCS | Performed by: INTERNAL MEDICINE

## 2019-05-14 PROCEDURE — G8427 DOCREV CUR MEDS BY ELIG CLIN: HCPCS | Performed by: INTERNAL MEDICINE

## 2019-05-14 PROCEDURE — 99213 OFFICE O/P EST LOW 20 MIN: CPT | Performed by: INTERNAL MEDICINE

## 2019-05-14 PROCEDURE — 1123F ACP DISCUSS/DSCN MKR DOCD: CPT | Performed by: INTERNAL MEDICINE

## 2019-05-14 PROCEDURE — 85610 PROTHROMBIN TIME: CPT | Performed by: INTERNAL MEDICINE

## 2019-05-14 PROCEDURE — G8598 ASA/ANTIPLAT THER USED: HCPCS | Performed by: INTERNAL MEDICINE

## 2019-05-14 PROCEDURE — 4040F PNEUMOC VAC/ADMIN/RCVD: CPT | Performed by: INTERNAL MEDICINE

## 2019-05-14 ASSESSMENT — PATIENT HEALTH QUESTIONNAIRE - PHQ9
2. FEELING DOWN, DEPRESSED OR HOPELESS: 0
SUM OF ALL RESPONSES TO PHQ9 QUESTIONS 1 & 2: 0
1. LITTLE INTEREST OR PLEASURE IN DOING THINGS: 0
SUM OF ALL RESPONSES TO PHQ QUESTIONS 1-9: 0
SUM OF ALL RESPONSES TO PHQ QUESTIONS 1-9: 0

## 2019-05-21 DIAGNOSIS — N28.9 RENAL DYSFUNCTION: ICD-10-CM

## 2019-05-21 LAB
ANION GAP SERPL CALCULATED.3IONS-SCNC: 14 MMOL/L (ref 3–16)
BUN BLDV-MCNC: 19 MG/DL (ref 7–20)
CALCIUM SERPL-MCNC: 9.3 MG/DL (ref 8.3–10.6)
CHLORIDE BLD-SCNC: 104 MMOL/L (ref 99–110)
CO2: 25 MMOL/L (ref 21–32)
CREAT SERPL-MCNC: 1 MG/DL (ref 0.8–1.3)
GFR AFRICAN AMERICAN: >60
GFR NON-AFRICAN AMERICAN: >60
GLUCOSE BLD-MCNC: 92 MG/DL (ref 70–99)
POTASSIUM SERPL-SCNC: 4.6 MMOL/L (ref 3.5–5.1)
SODIUM BLD-SCNC: 143 MMOL/L (ref 136–145)

## 2019-05-22 ENCOUNTER — TELEPHONE (OUTPATIENT)
Dept: INTERNAL MEDICINE CLINIC | Age: 79
End: 2019-05-22

## 2019-05-22 DIAGNOSIS — R42 DIZZINESS: Primary | ICD-10-CM

## 2019-05-25 NOTE — PROGRESS NOTES
S: Patient presents with problems of COPD, atrial fib on coumadin, AV shannan ablation s/p pacer, HTN, hyperlipidemia, ASHD s/p PTCA & stenting of OM1, and sleep apnea. His Wife is present. He continues to smoke and understands the need to stop. No headache, chest pain, or breathing difficulties. No palpitations, dizziness, or syncope. He is taking his coumadin as directed. O:Blood pressure 124/70, pulse 76, resp. rate 16, weight 217 lb (98.4 kg). standing 116/70      HEENT: TMs and canals were clear, oral pharynx clear       Neck: No palpable lymph nodes, normal thyroid examination. No neck muscle spasms.        Lungs: Diffuse decreased BS, no wheezing       Cardio: irreg pulse      Ext: no edema          LABS: from 5/6/19 CBC normal, Na 145 K 5.2 Cl 103 BUN 23 Creat 1.7, Glucose 102, Liver normal except alk phos 130, LDL 65 HDL 56 Trig 91, Hgba1c 6.0%, CK 53 TSH 3.22; from 5/14/19 INR 2.0     A: ASHD s/p PTCA & stenting of OM1 on 5/12/16      Hypertension controlled      COPD stable       Smoking        Chronic atrial fib on coumadin      Hyperlipidemia controlled on Repatha       Glucose intolerance controlled    P: copy of labs given        Continue present medications       Encourage to stop smoking       Fingerstick INR in one month         OV in 3M with basic chem Hgba1c lipid liver cpk cbc tsh

## 2019-06-05 ENCOUNTER — PROCEDURE VISIT (OUTPATIENT)
Dept: CARDIOLOGY CLINIC | Age: 79
End: 2019-06-05
Payer: COMMERCIAL

## 2019-06-05 VITALS — HEART RATE: 70 BPM | DIASTOLIC BLOOD PRESSURE: 70 MMHG | SYSTOLIC BLOOD PRESSURE: 132 MMHG

## 2019-06-05 DIAGNOSIS — Z95.0 CARDIAC PACEMAKER IN SITU: Primary | ICD-10-CM

## 2019-06-05 PROCEDURE — 93279 PRGRMG DEV EVAL PM/LDLS PM: CPT | Performed by: INTERNAL MEDICINE

## 2019-06-05 RX ORDER — AMLODIPINE BESYLATE 10 MG/1
TABLET ORAL
Qty: 90 TABLET | Refills: 3 | Status: SHIPPED | OUTPATIENT
Start: 2019-06-05 | End: 2020-01-31

## 2019-06-11 DIAGNOSIS — F41.9 ANXIETY: ICD-10-CM

## 2019-06-11 RX ORDER — CLONAZEPAM 0.5 MG/1
TABLET ORAL
Qty: 90 TABLET | Refills: 0 | Status: SHIPPED | OUTPATIENT
Start: 2019-06-11 | End: 2019-12-19 | Stop reason: SDUPTHER

## 2019-06-11 NOTE — TELEPHONE ENCOUNTER
Controlled Substance Monitoring:    Acute and Chronic Pain Monitoring:   RX Monitoring 6/11/2019   Attestation -   Periodic Controlled Substance Monitoring No signs of potential drug abuse or diversion identified.

## 2019-06-12 NOTE — PROCEDURES
Deangelo Kirk  78 y.o., male  1940    Meño Amaro MD    Chief Complaint   Patient presents with    Procedure     Pacemaker check     Vitals:    06/05/19 0856   BP: 132/70   Pulse: 70     Pacer analysis is reviewed and filed in the pacer chart. Analysis is consistent with normal single-chamber Medtronic non-MRI pacer function with stable leads and appropriate battery status for the age of the device. Remaining battery is 23 months. Pacer is 99.5% pacing the right ventricle. Recommend continued every three month pacer check and follow up office visit as scheduled.     Kelly Verdugo MD, 6/12/2019 10:43 AM

## 2019-06-17 ENCOUNTER — TELEPHONE (OUTPATIENT)
Dept: CARDIOLOGY CLINIC | Age: 79
End: 2019-06-17

## 2019-06-17 NOTE — TELEPHONE ENCOUNTER
Received [facility] HEDIS request, emailed to Stas Dean at [email or web address]

## 2019-06-18 ENCOUNTER — NURSE ONLY (OUTPATIENT)
Dept: INTERNAL MEDICINE CLINIC | Age: 79
End: 2019-06-18
Payer: COMMERCIAL

## 2019-06-18 DIAGNOSIS — Z79.01 LONG TERM CURRENT USE OF ANTICOAGULANT THERAPY: ICD-10-CM

## 2019-06-18 DIAGNOSIS — I48.20 CHRONIC ATRIAL FIBRILLATION (HCC): ICD-10-CM

## 2019-06-18 LAB
INTERNATIONAL NORMALIZATION RATIO, POC: 1.6
PROTHROMBIN TIME, POC: ABNORMAL

## 2019-06-18 PROCEDURE — 85610 PROTHROMBIN TIME: CPT | Performed by: INTERNAL MEDICINE

## 2019-06-24 ENCOUNTER — TELEPHONE (OUTPATIENT)
Dept: CARDIOLOGY CLINIC | Age: 79
End: 2019-06-24

## 2019-06-24 RX ORDER — PROPRANOLOL HYDROCHLORIDE 20 MG/1
20 TABLET ORAL 3 TIMES DAILY
Qty: 270 TABLET | Refills: 3 | Status: SHIPPED | OUTPATIENT
Start: 2019-06-24 | End: 2020-02-27 | Stop reason: SDUPTHER

## 2019-06-24 NOTE — TELEPHONE ENCOUNTER
Patient has tremors, doctor suggested switching Metoprolol to Propranolol.  Wife asking if OK with dr Veronica Romero

## 2019-06-24 NOTE — TELEPHONE ENCOUNTER
The patients wife called in today about switching metprolol to another medication since the patient has trimmers.  Call the patients wife back when you get this message

## 2019-07-09 ENCOUNTER — TELEPHONE (OUTPATIENT)
Dept: CARDIOLOGY CLINIC | Age: 79
End: 2019-07-09

## 2019-07-22 RX ORDER — LOSARTAN POTASSIUM 100 MG/1
TABLET ORAL
Qty: 90 TABLET | Refills: 3 | Status: SHIPPED | OUTPATIENT
Start: 2019-07-22 | End: 2019-09-06

## 2019-07-23 ENCOUNTER — NURSE ONLY (OUTPATIENT)
Dept: INTERNAL MEDICINE CLINIC | Age: 79
End: 2019-07-23
Payer: COMMERCIAL

## 2019-07-23 DIAGNOSIS — I48.20 CHRONIC ATRIAL FIBRILLATION (HCC): ICD-10-CM

## 2019-07-23 DIAGNOSIS — Z79.01 LONG TERM CURRENT USE OF ANTICOAGULANT THERAPY: ICD-10-CM

## 2019-07-23 DIAGNOSIS — I48.20 CHRONIC ATRIAL FIBRILLATION (HCC): Primary | ICD-10-CM

## 2019-07-23 LAB
INTERNATIONAL NORMALIZATION RATIO, POC: 2.5
PROTHROMBIN TIME, POC: NORMAL

## 2019-07-23 PROCEDURE — 85610 PROTHROMBIN TIME: CPT | Performed by: INTERNAL MEDICINE

## 2019-08-13 ENCOUNTER — ANTI-COAG VISIT (OUTPATIENT)
Dept: INTERNAL MEDICINE CLINIC | Age: 79
End: 2019-08-13

## 2019-08-13 DIAGNOSIS — E03.4 HYPOTHYROIDISM DUE TO ACQUIRED ATROPHY OF THYROID: ICD-10-CM

## 2019-08-13 DIAGNOSIS — Z79.01 LONG TERM CURRENT USE OF ANTICOAGULANT THERAPY: ICD-10-CM

## 2019-08-13 DIAGNOSIS — I48.20 CHRONIC ATRIAL FIBRILLATION (HCC): ICD-10-CM

## 2019-08-13 DIAGNOSIS — E78.2 MIXED HYPERLIPIDEMIA: ICD-10-CM

## 2019-08-13 DIAGNOSIS — E11.69 TYPE 2 DIABETES MELLITUS WITH OTHER SPECIFIED COMPLICATION, UNSPECIFIED WHETHER LONG TERM INSULIN USE (HCC): ICD-10-CM

## 2019-08-13 DIAGNOSIS — I10 ESSENTIAL HYPERTENSION: ICD-10-CM

## 2019-08-13 LAB
ALBUMIN SERPL-MCNC: 4.8 G/DL (ref 3.4–5)
ALP BLD-CCNC: 123 U/L (ref 40–129)
ALT SERPL-CCNC: 17 U/L (ref 10–40)
ANION GAP SERPL CALCULATED.3IONS-SCNC: 20 MMOL/L (ref 3–16)
AST SERPL-CCNC: 20 U/L (ref 15–37)
BASOPHILS ABSOLUTE: 0.1 K/UL (ref 0–0.2)
BASOPHILS RELATIVE PERCENT: 0.9 %
BILIRUB SERPL-MCNC: 0.6 MG/DL (ref 0–1)
BILIRUBIN DIRECT: <0.2 MG/DL (ref 0–0.3)
BILIRUBIN, INDIRECT: NORMAL MG/DL (ref 0–1)
BUN BLDV-MCNC: 25 MG/DL (ref 7–20)
CALCIUM SERPL-MCNC: 9.8 MG/DL (ref 8.3–10.6)
CHLORIDE BLD-SCNC: 106 MMOL/L (ref 99–110)
CHOLESTEROL, TOTAL: 126 MG/DL (ref 0–199)
CO2: 22 MMOL/L (ref 21–32)
CREAT SERPL-MCNC: 1.4 MG/DL (ref 0.8–1.3)
EOSINOPHILS ABSOLUTE: 0.2 K/UL (ref 0–0.6)
EOSINOPHILS RELATIVE PERCENT: 2.9 %
GFR AFRICAN AMERICAN: 59
GFR NON-AFRICAN AMERICAN: 49
GLUCOSE BLD-MCNC: 92 MG/DL (ref 70–99)
HCT VFR BLD CALC: 48.8 % (ref 40.5–52.5)
HDLC SERPL-MCNC: 58 MG/DL (ref 40–60)
HEMOGLOBIN: 16 G/DL (ref 13.5–17.5)
INR BLD: 2.22 (ref 0.86–1.14)
LDL CHOLESTEROL CALCULATED: 48 MG/DL
LYMPHOCYTES ABSOLUTE: 1.7 K/UL (ref 1–5.1)
LYMPHOCYTES RELATIVE PERCENT: 20.3 %
MCH RBC QN AUTO: 32.1 PG (ref 26–34)
MCHC RBC AUTO-ENTMCNC: 32.8 G/DL (ref 31–36)
MCV RBC AUTO: 97.7 FL (ref 80–100)
MONOCYTES ABSOLUTE: 0.9 K/UL (ref 0–1.3)
MONOCYTES RELATIVE PERCENT: 11.4 %
NEUTROPHILS ABSOLUTE: 5.3 K/UL (ref 1.7–7.7)
NEUTROPHILS RELATIVE PERCENT: 64.5 %
PDW BLD-RTO: 14.9 % (ref 12.4–15.4)
PLATELET # BLD: 181 K/UL (ref 135–450)
PMV BLD AUTO: 10.4 FL (ref 5–10.5)
POTASSIUM SERPL-SCNC: 5.3 MMOL/L (ref 3.5–5.1)
PROTHROMBIN TIME: 25.3 SEC (ref 9.8–13)
RBC # BLD: 4.99 M/UL (ref 4.2–5.9)
SODIUM BLD-SCNC: 148 MMOL/L (ref 136–145)
TOTAL CK: 89 U/L (ref 39–308)
TOTAL PROTEIN: 7 G/DL (ref 6.4–8.2)
TRIGL SERPL-MCNC: 100 MG/DL (ref 0–150)
TSH SERPL DL<=0.05 MIU/L-ACNC: 4.95 UIU/ML (ref 0.27–4.2)
VLDLC SERPL CALC-MCNC: 20 MG/DL
WBC # BLD: 8.2 K/UL (ref 4–11)

## 2019-08-14 LAB
ESTIMATED AVERAGE GLUCOSE: 137 MG/DL
HBA1C MFR BLD: 6.4 %

## 2019-08-21 ENCOUNTER — OFFICE VISIT (OUTPATIENT)
Dept: INTERNAL MEDICINE CLINIC | Age: 79
End: 2019-08-21
Payer: COMMERCIAL

## 2019-08-21 VITALS
SYSTOLIC BLOOD PRESSURE: 114 MMHG | DIASTOLIC BLOOD PRESSURE: 70 MMHG | HEART RATE: 80 BPM | WEIGHT: 221 LBS | BODY MASS INDEX: 31.71 KG/M2

## 2019-08-21 DIAGNOSIS — E03.4 HYPOTHYROIDISM DUE TO ACQUIRED ATROPHY OF THYROID: ICD-10-CM

## 2019-08-21 DIAGNOSIS — I48.20 CHRONIC ATRIAL FIBRILLATION (HCC): ICD-10-CM

## 2019-08-21 DIAGNOSIS — E03.4 HYPOTHYROIDISM DUE TO ACQUIRED ATROPHY OF THYROID: Primary | ICD-10-CM

## 2019-08-21 DIAGNOSIS — E78.2 MIXED HYPERLIPIDEMIA: ICD-10-CM

## 2019-08-21 DIAGNOSIS — I10 ESSENTIAL HYPERTENSION: ICD-10-CM

## 2019-08-21 DIAGNOSIS — J44.9 CHRONIC OBSTRUCTIVE PULMONARY DISEASE, UNSPECIFIED COPD TYPE (HCC): Primary | ICD-10-CM

## 2019-08-21 DIAGNOSIS — E11.69 TYPE 2 DIABETES MELLITUS WITH OTHER SPECIFIED COMPLICATION, UNSPECIFIED WHETHER LONG TERM INSULIN USE (HCC): ICD-10-CM

## 2019-08-21 PROCEDURE — G8427 DOCREV CUR MEDS BY ELIG CLIN: HCPCS | Performed by: INTERNAL MEDICINE

## 2019-08-21 PROCEDURE — G8598 ASA/ANTIPLAT THER USED: HCPCS | Performed by: INTERNAL MEDICINE

## 2019-08-21 PROCEDURE — 4004F PT TOBACCO SCREEN RCVD TLK: CPT | Performed by: INTERNAL MEDICINE

## 2019-08-21 PROCEDURE — G8926 SPIRO NO PERF OR DOC: HCPCS | Performed by: INTERNAL MEDICINE

## 2019-08-21 PROCEDURE — 4040F PNEUMOC VAC/ADMIN/RCVD: CPT | Performed by: INTERNAL MEDICINE

## 2019-08-21 PROCEDURE — G8417 CALC BMI ABV UP PARAM F/U: HCPCS | Performed by: INTERNAL MEDICINE

## 2019-08-21 PROCEDURE — 3023F SPIROM DOC REV: CPT | Performed by: INTERNAL MEDICINE

## 2019-08-21 PROCEDURE — 1123F ACP DISCUSS/DSCN MKR DOCD: CPT | Performed by: INTERNAL MEDICINE

## 2019-08-21 PROCEDURE — 99213 OFFICE O/P EST LOW 20 MIN: CPT | Performed by: INTERNAL MEDICINE

## 2019-08-29 ENCOUNTER — TELEPHONE (OUTPATIENT)
Dept: INTERNAL MEDICINE CLINIC | Age: 79
End: 2019-08-29

## 2019-08-29 NOTE — TELEPHONE ENCOUNTER
Detailed message was left for Alexander Barbosa and Ruthy to only take 1/2 of Norvasc and to call me in the morning to schedule the BP check

## 2019-08-29 NOTE — TELEPHONE ENCOUNTER
Hold the Norvasc 10 mg tablet today, then restart on 8/30 at 1/2 tablet a day, do BP check in one week

## 2019-09-06 ENCOUNTER — OFFICE VISIT (OUTPATIENT)
Dept: CARDIOLOGY CLINIC | Age: 79
End: 2019-09-06
Payer: COMMERCIAL

## 2019-09-06 VITALS
SYSTOLIC BLOOD PRESSURE: 90 MMHG | WEIGHT: 223.2 LBS | BODY MASS INDEX: 31.95 KG/M2 | DIASTOLIC BLOOD PRESSURE: 50 MMHG | HEART RATE: 60 BPM | HEIGHT: 70 IN

## 2019-09-06 DIAGNOSIS — R42 DIZZINESS: Primary | ICD-10-CM

## 2019-09-06 PROCEDURE — 4040F PNEUMOC VAC/ADMIN/RCVD: CPT | Performed by: INTERNAL MEDICINE

## 2019-09-06 PROCEDURE — 4004F PT TOBACCO SCREEN RCVD TLK: CPT | Performed by: INTERNAL MEDICINE

## 2019-09-06 PROCEDURE — G8427 DOCREV CUR MEDS BY ELIG CLIN: HCPCS | Performed by: INTERNAL MEDICINE

## 2019-09-06 PROCEDURE — G8417 CALC BMI ABV UP PARAM F/U: HCPCS | Performed by: INTERNAL MEDICINE

## 2019-09-06 PROCEDURE — 99214 OFFICE O/P EST MOD 30 MIN: CPT | Performed by: INTERNAL MEDICINE

## 2019-09-06 PROCEDURE — G8598 ASA/ANTIPLAT THER USED: HCPCS | Performed by: INTERNAL MEDICINE

## 2019-09-06 PROCEDURE — 1123F ACP DISCUSS/DSCN MKR DOCD: CPT | Performed by: INTERNAL MEDICINE

## 2019-09-06 NOTE — PROGRESS NOTES
MM ELIGIO stent to OM 1    Teeth missing     Lower    Wears dentures     Full Lower    Wears glasses      Past Surgical History:   Procedure Laterality Date    CARDIAC PACEMAKER PLACEMENT  5-3-99, 1-29-14    Medtronic Sensia SR Pacemaker Implanted    COLONOSCOPY  8-12 - Dr Matteo Salgado    Polyps Removed In Past    CORONARY ANGIOPLASTY WITH STENT PLACEMENT      per old chart had cath with stent placement done 5/2016    CYSTOSCOPY Left 11/17/2017    cysto, left retrograde, flugeration of prostate    DENTAL SURGERY      Teeth Extracted In Past    ENDOSCOPY, COLON, DIAGNOSTIC  1983    KNEE SURGERY Right 1966    \"shaved the kneecap\"    LITHOTRIPSY  2009, 2014    Kidney Stones    PACEMAKER PLACEMENT  5-3-99    Medtronic Donovan SR Pacemaker Implanted    PACEMAKER PLACEMENT  1-29-14    Medtronic Donovan SR Pacemaker Implanted( had to replace the whole thing in 2014- that was my 2rd one - first one in [address] [place] [address] and next one 2007\"    SKIN CANCER EXCISION  In Past    Skin Cancer Excised Arms And Ears     Family History   Problem Relation Age of Onset    Heart Disease Mother     Cancer Father 76        Leukemia    Heart Disease Father         Atrial Fib    Cancer Sister         Leukemia    Cancer Brother         Leukemia     Social History     Tobacco Use    Smoking status: Current Every Day Smoker     Packs/day: 0.50     Years: 67.00     Pack years: 33.50     Types: Cigarettes     Start date: 8/14/1948    Smokeless tobacco: Never Used   Substance Use Topics    Alcohol use: No     Alcohol/week: 0.0 standard drinks      @ROSAOopsLab@  Review of Systems:   · Constitutional: No Fever or Weight Loss   · Eyes: No Decreased Vision  · ENT: No Headaches, Hearing Loss or Vertigo  · Cardiovascular: No chest pain, dyspnea on exertion, palpitations or loss of consciousness  · Respiratory: No cough or wheezing    · Gastrointestinal: No abdominal pain, appetite loss, blood in stools, constipation, diarrhea or heartburn  · Genitourinary: bruits  Musculoskeletal - No significant edema, no kyphosis or scoliosis, no deformity in any extremity noted, muscle strength and tone are normal  Skin: no ulcer,no scar,no stasis dermatitis   Neurologic - alert oriented times 3,Cranial nerves II through XII are grossly intact. There were no gross focal neurologic abnormalities. All sensory and motor nerves examined and were normal  Psychiatric: normal mood and affect    Lab Results   Component Value Date    CKTOTAL 89 08/13/2019    TROPONINI 0.020 12/04/2012     BNP:    Lab Results   Component Value Date    BNP 83.4 10/18/2010     PT/INR:  No results found for: PTINR  Lab Results   Component Value Date    LABA1C 6.4 08/13/2019    LABA1C 6.0 05/06/2019     Lab Results   Component Value Date    CHOL 126 08/13/2019    TRIG 100 08/13/2019    HDL 58 08/13/2019    LDLCALC 48 08/13/2019    LDLDIRECT 101 (H) 10/31/2018     Lab Results   Component Value Date    ALT 17 08/13/2019    AST 20 08/13/2019     TSH:    Lab Results   Component Value Date    TSH 4.95 08/13/2019     PACER/ICD  INTERROGATION      LIFE/VOLTAGE: 20 months remaining  A FIB:No  VVIR MODE  VENTRICULAR PACING:YES 99.5%  SHOCKS:No  ALL lead thresholds, impedence and functions are normal  Recommend to continue routine evaluation        Impression:  Oxana Sandhu is a 78 y. o.year old who  has a past medical history of Acid reflux, Adenomatous polyp of colon, Anticoagulated, Arthritis, CAD (coronary artery disease), Chronic atrial fibrillation (HCC), COPD (chronic obstructive pulmonary disease) ([facility] [address] Diverticulitis, Diverticulosis of colon, Enlarged prostate, H/O cardiac catheterization, H/O cardiovascular stress test, H/O cardiovascular stress test, H/O cardiovascular stress test, H/O Doppler ultrasound, H/O echocardiogram, H/O echocardiogram, H/O percutaneous left heart catheterization, H/O unstable angina, History of Doppler ultrasound, History of Doppler ultrasound, History of Holter monitoring, Hx of CT scan of

## 2019-09-10 ENCOUNTER — ANTI-COAG VISIT (OUTPATIENT)
Dept: INTERNAL MEDICINE CLINIC | Age: 79
End: 2019-09-10

## 2019-09-10 DIAGNOSIS — I48.20 CHRONIC ATRIAL FIBRILLATION (HCC): ICD-10-CM

## 2019-09-10 DIAGNOSIS — I10 ESSENTIAL HYPERTENSION: ICD-10-CM

## 2019-09-10 DIAGNOSIS — R42 DIZZINESS: ICD-10-CM

## 2019-09-10 LAB
ANION GAP SERPL CALCULATED.3IONS-SCNC: 14 MMOL/L (ref 3–16)
BUN BLDV-MCNC: 24 MG/DL (ref 7–20)
CALCIUM SERPL-MCNC: 9.4 MG/DL (ref 8.3–10.6)
CHLORIDE BLD-SCNC: 106 MMOL/L (ref 99–110)
CO2: 25 MMOL/L (ref 21–32)
CREAT SERPL-MCNC: 1.2 MG/DL (ref 0.8–1.3)
GFR AFRICAN AMERICAN: >60
GFR NON-AFRICAN AMERICAN: 58
GLUCOSE BLD-MCNC: 93 MG/DL (ref 70–99)
INR BLD: 2.67 (ref 0.86–1.14)
POTASSIUM SERPL-SCNC: 4.9 MMOL/L (ref 3.5–5.1)
PROTHROMBIN TIME: 30.4 SEC (ref 9.8–13)
SODIUM BLD-SCNC: 145 MMOL/L (ref 136–145)

## 2019-09-18 ENCOUNTER — PROCEDURE VISIT (OUTPATIENT)
Dept: CARDIOLOGY CLINIC | Age: 79
End: 2019-09-18
Payer: COMMERCIAL

## 2019-09-18 DIAGNOSIS — R42 DIZZINESS: Primary | ICD-10-CM

## 2019-09-18 LAB
LV EF: 58 %
LVEF MODALITY: NORMAL

## 2019-09-18 PROCEDURE — 93306 TTE W/DOPPLER COMPLETE: CPT | Performed by: INTERNAL MEDICINE

## 2019-09-23 ENCOUNTER — TELEPHONE (OUTPATIENT)
Dept: CARDIOLOGY CLINIC | Age: 79
End: 2019-09-23

## 2019-09-30 ENCOUNTER — OFFICE VISIT (OUTPATIENT)
Dept: CARDIOLOGY CLINIC | Age: 79
End: 2019-09-30
Payer: COMMERCIAL

## 2019-09-30 VITALS
WEIGHT: 225 LBS | HEART RATE: 76 BPM | BODY MASS INDEX: 32.21 KG/M2 | DIASTOLIC BLOOD PRESSURE: 82 MMHG | HEIGHT: 70 IN | SYSTOLIC BLOOD PRESSURE: 122 MMHG

## 2019-09-30 DIAGNOSIS — N18.30 STAGE 3 CHRONIC KIDNEY DISEASE (HCC): Primary | ICD-10-CM

## 2019-09-30 PROCEDURE — 4004F PT TOBACCO SCREEN RCVD TLK: CPT | Performed by: INTERNAL MEDICINE

## 2019-09-30 PROCEDURE — G8598 ASA/ANTIPLAT THER USED: HCPCS | Performed by: INTERNAL MEDICINE

## 2019-09-30 PROCEDURE — 1123F ACP DISCUSS/DSCN MKR DOCD: CPT | Performed by: INTERNAL MEDICINE

## 2019-09-30 PROCEDURE — 4040F PNEUMOC VAC/ADMIN/RCVD: CPT | Performed by: INTERNAL MEDICINE

## 2019-09-30 PROCEDURE — G8417 CALC BMI ABV UP PARAM F/U: HCPCS | Performed by: INTERNAL MEDICINE

## 2019-09-30 PROCEDURE — G8427 DOCREV CUR MEDS BY ELIG CLIN: HCPCS | Performed by: INTERNAL MEDICINE

## 2019-09-30 PROCEDURE — 99214 OFFICE O/P EST MOD 30 MIN: CPT | Performed by: INTERNAL MEDICINE

## 2019-09-30 NOTE — PROGRESS NOTES
1 CAPSULE DAILY 90 capsule 3    baclofen (LIORESAL) 10 MG tablet Take 1 tablet by mouth 2 times daily as needed (muscle spasms) 30 tablet 1    levothyroxine (SYNTHROID) 25 MCG tablet Take 1 tablet by mouth Daily 90 tablet 3    warfarin (COUMADIN) 1 MG tablet Take 2 mg by mouth daily      fluticasone-salmeterol (ADVAIR HFA) 230-21 MCG/ACT inhaler Inhale 2 puffs into the lungs 2 times daily 3 Inhaler 3    JANTOVEN 2 MG tablet TAKE 2 TABLETS ON MONDAY,  WEDNESDAY AND FRIDAY. TAKE 3 TABLETS ON THE OTHER     DAYS OR AS DIRECTED. 220 tablet 3    aspirin 81 MG EC tablet Take 1 tablet by mouth daily 30 tablet 3    Multiple Vitamins-Minerals (CENTRUM PO) Take by mouth nightly Over The Counter      Cholecalciferol (VITAMIN D PO) Take by mouth 2 times daily Over The Counter       clonazePAM (KLONOPIN) 0.5 MG tablet TAKE 1 TABLET BY MOUTH AT BEDTIME AS NEEDED FOR ANXIETY 90 tablet 0     No current facility-administered medications for this visit. Allergies: Other;  Atorvastatin; Flomax [tamsulosin hcl]; and Proscar [finasteride]  Past Medical History:   Diagnosis Date    Acid reflux     Adenomatous polyp of colon 1-2009    last colonoscopy recomm 3 yr follow up    Anticoagulated 2010    takes for afib Dr Isidro Thomas monitors INR 1.2 on 02/08/2018    Arthritis     \"arthritis in low back\"    CAD (coronary artery disease)     follow with dr Mihai Bustillos- cardiac clearance for surgery done 10/31/2017 on chart)    Chronic atrial fibrillation (HCC)     Sees Dr. Cinthia Griffin COPD (chronic obstructive pulmonary disease) ([facility] [address]     follows with dr Javon Davis Diverticulitis     Diverticulosis of colon     left colon    Enlarged prostate     \"had to ream me out a couple of times\":   [facility] H/O cardiac catheterization 1/31/2006    R codominant, RCA patent, LAD patent with minimal nonobstructive irreg, CIRC patent and codominant, RI small but patent    H/O cardiovascular stress test 5/18/10    EF81% decrease in perfusion in inferoseptal segments c/w paced rhythm, LV small in volume    H/O cardiovascular stress test 5/5/2016    lexiscan-normal,EF70%    H/O cardiovascular stress test 6/2/2016    treadmill    H/O Doppler ultrasound 06/15/2016    minimal plaque throughout bilaterally    H/O echocardiogram 5/3/16    EF 50-55%. Mild concentric LV hypertrophy with low normal systolic function. Mod bilateral atrial and right ventricle dilatation. Mild AR, MR, TR, IA. Absence of pericardial effsion.  H/O echocardiogram 8/4/11    EF(not given in faxed records) mild MR, mod LAE, mild AR    H/O echocardiogram 09/18/2019    EF 55-60%,  The left atrium is moderate to severely dilated.     H/O percutaneous left heart catheterization 5/12/16    left circumflex is 60% very distal stenosis at the bifurcation, OM1 is 99% mid segment stenosis    H/O unstable angina     History of Doppler ultrasound 3/29/12    Abd Ao Duplex - no abdominal aoritic aneurysm or iliac aneurysm identified    History of Doppler ultrasound 10/19/10    carotid - 0-19% bilateral ICAs, R and L carotid system show minimal atheromatous diseassse without stenosis    History of Holter monitoring 10/19/10    rhythm is ventricular paced with underlying a-fib    Hx of CT scan of chest 8/17/10    mod-severe centrilobular emphysematous changes    HX OTHER MEDICAL     Primary Care Physician Is Dr. Ramiro Ko    Hyperlipidemia     Hypertension     Kidney stones \"Been Having Kidney Stones For 39 Years\"    Passed Kidney Stones Several Times    Nephrolithiasis     with hematuria episodes followed by Dr Joanna Campbell Nocturia     Pacemaker [id number]    S/P AV ablation    Peptic ulcer, unspecified site, unspecified as acute or chronic, without mention of hemorrhage or perforation 1983    per EGD    Pneumonia Dx 2000's    Shortness of breath     Skin Cancer Arms And Ears In Past    Excision Skin Cancer Arms And Ears    Sleep apnea     No CPAP- had sleep study done 10+ yrs ago   Marcus Willams constipation, diarrhea or heartburn  · Genitourinary: No dysuria, trouble voiding, or hematuria  · Musculoskeletal:  No gait disturbance, weakness or joint complaints  · Integumentary: No rash or pruritis  · Neurological: No TIA or stroke symptoms  · Psychiatric: No anxiety or depression  · Endocrine: No malaise, fatigue or temperature intolerance  · Hematologic/Lymphatic: No bleeding problems, blood clots or swollen lymph nodes  · Allergic/Immunologic: No nasal congestion or hives  All systems negative except as marked. Objective:  /82   Pulse 76   Ht 5' 10\" (1.778 m)   Wt 225 lb (102.1 kg)   BMI 32.28 kg/m²   Wt Readings from Last 3 Encounters:   09/30/19 225 lb (102.1 kg)   09/06/19 223 lb 3.2 oz (101.2 kg)   08/21/19 221 lb (100.2 kg)     Body mass index is 32.28 kg/m². GENERAL - Alert, oriented, pleasant, in no apparent distress,normal grooming  HEENT - pupils are reactive to light and accomodation, cornea intact, conjunctive normal color, ears are normal in exam,throat exam in normal, teeth, gum and palate are normal, oral mucosa is normal without any notation of pallor or cyanosis  Neck - Supple. No jugular venous distention noted. No carotid bruits, no apical -carotid delay  Respiratory:  Normal breath sounds, No respiratory distress, No wheezing, No chest tenderness. ,no use of accessory muscles, diaphragm movement is normal  Cardiovascular: (PMI) apex non displaced,no lifts no thrills, no s3,no s4, Normal heart rate, Normal rhythm, No murmurs, No rubs, No gallops.  Carotid arteries pulse and amplitude are normal no bruit, no abdominal bruit noted ( normal abdominal aorta ausculation), femoral arteries pulse and amplitude are normal no bruit, pedal pulses are normal  Femoral pulses have normal amplitude, no bruits   Extremities - No cyanosis, clubbing, or significant edema, no varicose veins    Abdomen - No masses, tenderness, or organomegaly, no hepato-splenomegally, no bruits  Musculoskeletal

## 2019-10-02 ENCOUNTER — TELEPHONE (OUTPATIENT)
Dept: CARDIOLOGY CLINIC | Age: 79
End: 2019-10-02

## 2019-10-09 ENCOUNTER — NURSE ONLY (OUTPATIENT)
Dept: INTERNAL MEDICINE CLINIC | Age: 79
End: 2019-10-09
Payer: COMMERCIAL

## 2019-10-09 DIAGNOSIS — I48.20 CHRONIC ATRIAL FIBRILLATION (HCC): ICD-10-CM

## 2019-10-09 DIAGNOSIS — Z79.01 LONG TERM CURRENT USE OF ANTICOAGULANT THERAPY: ICD-10-CM

## 2019-10-09 LAB
INTERNATIONAL NORMALIZATION RATIO, POC: 3.3
PROTHROMBIN TIME, POC: ABNORMAL

## 2019-10-09 PROCEDURE — 85610 PROTHROMBIN TIME: CPT | Performed by: INTERNAL MEDICINE

## 2019-10-10 ENCOUNTER — TELEPHONE (OUTPATIENT)
Dept: CARDIOLOGY CLINIC | Age: 79
End: 2019-10-10

## 2019-10-23 ENCOUNTER — NURSE ONLY (OUTPATIENT)
Dept: INTERNAL MEDICINE CLINIC | Age: 79
End: 2019-10-23
Payer: COMMERCIAL

## 2019-10-23 DIAGNOSIS — Z79.01 LONG TERM CURRENT USE OF ANTICOAGULANT THERAPY: ICD-10-CM

## 2019-10-23 DIAGNOSIS — I48.20 CHRONIC ATRIAL FIBRILLATION (HCC): ICD-10-CM

## 2019-10-23 LAB
INTERNATIONAL NORMALIZATION RATIO, POC: 2.6
PROTHROMBIN TIME, POC: NORMAL

## 2019-10-23 PROCEDURE — 85610 PROTHROMBIN TIME: CPT | Performed by: INTERNAL MEDICINE

## 2019-10-29 ENCOUNTER — OFFICE VISIT (OUTPATIENT)
Dept: CARDIOLOGY CLINIC | Age: 79
End: 2019-10-29
Payer: COMMERCIAL

## 2019-10-29 ENCOUNTER — TELEPHONE (OUTPATIENT)
Dept: CARDIOLOGY CLINIC | Age: 79
End: 2019-10-29

## 2019-10-29 VITALS
SYSTOLIC BLOOD PRESSURE: 108 MMHG | WEIGHT: 226.4 LBS | DIASTOLIC BLOOD PRESSURE: 60 MMHG | HEIGHT: 70 IN | HEART RATE: 78 BPM | BODY MASS INDEX: 32.41 KG/M2

## 2019-10-29 DIAGNOSIS — R42 DIZZINESS: Primary | ICD-10-CM

## 2019-10-29 PROCEDURE — G8598 ASA/ANTIPLAT THER USED: HCPCS | Performed by: INTERNAL MEDICINE

## 2019-10-29 PROCEDURE — 4004F PT TOBACCO SCREEN RCVD TLK: CPT | Performed by: INTERNAL MEDICINE

## 2019-10-29 PROCEDURE — 99214 OFFICE O/P EST MOD 30 MIN: CPT | Performed by: INTERNAL MEDICINE

## 2019-10-29 PROCEDURE — 4040F PNEUMOC VAC/ADMIN/RCVD: CPT | Performed by: INTERNAL MEDICINE

## 2019-10-29 PROCEDURE — G8427 DOCREV CUR MEDS BY ELIG CLIN: HCPCS | Performed by: INTERNAL MEDICINE

## 2019-10-29 PROCEDURE — G8417 CALC BMI ABV UP PARAM F/U: HCPCS | Performed by: INTERNAL MEDICINE

## 2019-10-29 PROCEDURE — 1123F ACP DISCUSS/DSCN MKR DOCD: CPT | Performed by: INTERNAL MEDICINE

## 2019-10-29 PROCEDURE — G8484 FLU IMMUNIZE NO ADMIN: HCPCS | Performed by: INTERNAL MEDICINE

## 2019-11-01 ENCOUNTER — PROCEDURE VISIT (OUTPATIENT)
Dept: CARDIOLOGY CLINIC | Age: 79
End: 2019-11-01
Payer: COMMERCIAL

## 2019-11-01 DIAGNOSIS — R42 DIZZINESS: Primary | ICD-10-CM

## 2019-11-01 PROCEDURE — 93880 EXTRACRANIAL BILAT STUDY: CPT | Performed by: INTERNAL MEDICINE

## 2019-11-04 DIAGNOSIS — E03.4 HYPOTHYROIDISM DUE TO ACQUIRED ATROPHY OF THYROID: ICD-10-CM

## 2019-11-04 DIAGNOSIS — Z79.01 LONG TERM CURRENT USE OF ANTICOAGULANT THERAPY: ICD-10-CM

## 2019-11-04 DIAGNOSIS — E78.2 MIXED HYPERLIPIDEMIA: ICD-10-CM

## 2019-11-04 DIAGNOSIS — E11.69 TYPE 2 DIABETES MELLITUS WITH OTHER SPECIFIED COMPLICATION, UNSPECIFIED WHETHER LONG TERM INSULIN USE (HCC): ICD-10-CM

## 2019-11-04 DIAGNOSIS — I48.20 CHRONIC ATRIAL FIBRILLATION (HCC): ICD-10-CM

## 2019-11-04 DIAGNOSIS — I10 ESSENTIAL HYPERTENSION: ICD-10-CM

## 2019-11-04 LAB
ALBUMIN SERPL-MCNC: 5 G/DL (ref 3.4–5)
ALP BLD-CCNC: 123 U/L (ref 40–129)
ALT SERPL-CCNC: 12 U/L (ref 10–40)
ANION GAP SERPL CALCULATED.3IONS-SCNC: 17 MMOL/L (ref 3–16)
AST SERPL-CCNC: 16 U/L (ref 15–37)
BASOPHILS ABSOLUTE: 0.1 K/UL (ref 0–0.2)
BASOPHILS RELATIVE PERCENT: 1 %
BILIRUB SERPL-MCNC: 0.6 MG/DL (ref 0–1)
BILIRUBIN DIRECT: <0.2 MG/DL (ref 0–0.3)
BILIRUBIN, INDIRECT: NORMAL MG/DL (ref 0–1)
BUN BLDV-MCNC: 23 MG/DL (ref 7–20)
CALCIUM SERPL-MCNC: 9.7 MG/DL (ref 8.3–10.6)
CHLORIDE BLD-SCNC: 101 MMOL/L (ref 99–110)
CHOLESTEROL, TOTAL: 158 MG/DL (ref 0–199)
CO2: 25 MMOL/L (ref 21–32)
CREAT SERPL-MCNC: 1.3 MG/DL (ref 0.8–1.3)
EOSINOPHILS ABSOLUTE: 0.1 K/UL (ref 0–0.6)
EOSINOPHILS RELATIVE PERCENT: 1.8 %
GFR AFRICAN AMERICAN: >60
GFR NON-AFRICAN AMERICAN: 53
GLUCOSE BLD-MCNC: 102 MG/DL (ref 70–99)
HCT VFR BLD CALC: 50.7 % (ref 40.5–52.5)
HDLC SERPL-MCNC: 54 MG/DL (ref 40–60)
HEMOGLOBIN: 17 G/DL (ref 13.5–17.5)
INR BLD: 2.18 (ref 0.86–1.14)
LDL CHOLESTEROL CALCULATED: 76 MG/DL
LYMPHOCYTES ABSOLUTE: 1.7 K/UL (ref 1–5.1)
LYMPHOCYTES RELATIVE PERCENT: 21.4 %
MCH RBC QN AUTO: 32.8 PG (ref 26–34)
MCHC RBC AUTO-ENTMCNC: 33.5 G/DL (ref 31–36)
MCV RBC AUTO: 97.8 FL (ref 80–100)
MONOCYTES ABSOLUTE: 1 K/UL (ref 0–1.3)
MONOCYTES RELATIVE PERCENT: 12.3 %
NEUTROPHILS ABSOLUTE: 5 K/UL (ref 1.7–7.7)
NEUTROPHILS RELATIVE PERCENT: 63.5 %
PDW BLD-RTO: 14.6 % (ref 12.4–15.4)
PLATELET # BLD: 192 K/UL (ref 135–450)
PMV BLD AUTO: 10.1 FL (ref 5–10.5)
POTASSIUM SERPL-SCNC: 4.6 MMOL/L (ref 3.5–5.1)
PROTHROMBIN TIME: 24.8 SEC (ref 9.8–13)
RBC # BLD: 5.19 M/UL (ref 4.2–5.9)
SODIUM BLD-SCNC: 143 MMOL/L (ref 136–145)
TOTAL CK: 44 U/L (ref 39–308)
TOTAL PROTEIN: 7.2 G/DL (ref 6.4–8.2)
TRIGL SERPL-MCNC: 140 MG/DL (ref 0–150)
TSH SERPL DL<=0.05 MIU/L-ACNC: 3.87 UIU/ML (ref 0.27–4.2)
VLDLC SERPL CALC-MCNC: 28 MG/DL
WBC # BLD: 7.9 K/UL (ref 4–11)

## 2019-11-05 ENCOUNTER — ANTI-COAG VISIT (OUTPATIENT)
Dept: INTERNAL MEDICINE CLINIC | Age: 79
End: 2019-11-05

## 2019-11-05 ENCOUNTER — HOSPITAL ENCOUNTER (OUTPATIENT)
Dept: ULTRASOUND IMAGING | Age: 79
End: 2019-11-05
Payer: MEDICARE

## 2019-11-05 ENCOUNTER — TELEPHONE (OUTPATIENT)
Dept: CARDIOLOGY CLINIC | Age: 79
End: 2019-11-05

## 2019-11-05 ENCOUNTER — HOSPITAL ENCOUNTER (OUTPATIENT)
Dept: ULTRASOUND IMAGING | Age: 79
Discharge: HOME | End: 2019-11-05
Payer: MEDICARE

## 2019-11-05 ENCOUNTER — HOSPITAL ENCOUNTER (OUTPATIENT)
Age: 79
Discharge: HOME | End: 2019-11-05
Payer: MEDICARE

## 2019-11-05 DIAGNOSIS — I15.0 BENIGN RENOVASCULAR HYPERTENSION: ICD-10-CM

## 2019-11-05 DIAGNOSIS — N17.8 ACUTE RENAL FAILURE WITH SPECIFIED LESION (HCC): ICD-10-CM

## 2019-11-05 DIAGNOSIS — N18.30 CHRONIC KIDNEY DISEASE, STAGE III (MODERATE) (HCC): ICD-10-CM

## 2019-11-05 LAB
ANION GAP SERPL CALCULATED.3IONS-SCNC: 13 MMOL/L (ref 4–16)
BACTERIA: ABNORMAL /HPF
BILIRUBIN URINE: NEGATIVE MG/DL
BLOOD, URINE: NEGATIVE
BUN BLDV-MCNC: 19 MG/DL (ref 6–23)
CALCIUM SERPL-MCNC: 9.1 MG/DL (ref 8.3–10.6)
CHLORIDE BLD-SCNC: 104 MMOL/L (ref 99–110)
CLARITY: CLEAR
CO2: 27 MMOL/L (ref 21–32)
COLOR: YELLOW
CREAT SERPL-MCNC: 1.2 MG/DL (ref 0.9–1.3)
ESTIMATED AVERAGE GLUCOSE: 131.2 MG/DL
GFR AFRICAN AMERICAN: >60 ML/MIN/1.73M2
GFR NON-AFRICAN AMERICAN: 58 ML/MIN/1.73M2
GLUCOSE BLD-MCNC: 88 MG/DL (ref 70–99)
GLUCOSE, URINE: NEGATIVE MG/DL
HBA1C MFR BLD: 6.2 %
KETONES, URINE: NEGATIVE MG/DL
LEUKOCYTE ESTERASE, URINE: NEGATIVE
MUCUS: ABNORMAL HPF
NITRITE URINE, QUANTITATIVE: NEGATIVE
PH, URINE: 6 (ref 5–8)
POTASSIUM SERPL-SCNC: 4.6 MMOL/L (ref 3.5–5.1)
PROTEIN UA: NEGATIVE MG/DL
RBC URINE: 1 /HPF (ref 0–3)
SODIUM BLD-SCNC: 144 MMOL/L (ref 135–145)
SPECIFIC GRAVITY UA: 1.02 (ref 1–1.03)
SQUAMOUS EPITHELIAL: 1 /HPF
TRICHOMONAS: ABNORMAL /HPF
UROBILINOGEN, URINE: NORMAL MG/DL (ref 0.2–1)
WBC UA: 2 /HPF (ref 0–2)

## 2019-11-05 PROCEDURE — 36415 COLL VENOUS BLD VENIPUNCTURE: CPT

## 2019-11-05 PROCEDURE — 81001 URINALYSIS AUTO W/SCOPE: CPT

## 2019-11-05 PROCEDURE — 93975 VASCULAR STUDY: CPT

## 2019-11-05 PROCEDURE — 80048 BASIC METABOLIC PNL TOTAL CA: CPT

## 2019-11-05 PROCEDURE — 76775 US EXAM ABDO BACK WALL LIM: CPT

## 2019-11-06 ENCOUNTER — TELEPHONE (OUTPATIENT)
Dept: CARDIOLOGY CLINIC | Age: 79
End: 2019-11-06

## 2019-11-08 ENCOUNTER — TELEPHONE (OUTPATIENT)
Dept: CARDIOLOGY CLINIC | Age: 79
End: 2019-11-08

## 2019-11-14 ENCOUNTER — TELEPHONE (OUTPATIENT)
Dept: CARDIOLOGY CLINIC | Age: 79
End: 2019-11-14

## 2019-11-22 ENCOUNTER — OFFICE VISIT (OUTPATIENT)
Dept: INTERNAL MEDICINE CLINIC | Age: 79
End: 2019-11-22
Payer: COMMERCIAL

## 2019-11-22 VITALS
WEIGHT: 222 LBS | HEART RATE: 84 BPM | DIASTOLIC BLOOD PRESSURE: 70 MMHG | BODY MASS INDEX: 31.85 KG/M2 | SYSTOLIC BLOOD PRESSURE: 110 MMHG

## 2019-11-22 DIAGNOSIS — E74.39 GLUCOSE INTOLERANCE: ICD-10-CM

## 2019-11-22 DIAGNOSIS — Z23 NEED FOR INFLUENZA VACCINATION: ICD-10-CM

## 2019-11-22 DIAGNOSIS — I48.20 CHRONIC ATRIAL FIBRILLATION (HCC): ICD-10-CM

## 2019-11-22 DIAGNOSIS — E78.2 MIXED HYPERLIPIDEMIA: ICD-10-CM

## 2019-11-22 DIAGNOSIS — E11.69 TYPE 2 DIABETES MELLITUS WITH OTHER SPECIFIED COMPLICATION, UNSPECIFIED WHETHER LONG TERM INSULIN USE (HCC): Primary | ICD-10-CM

## 2019-11-22 DIAGNOSIS — J44.9 COPD, SEVERE (HCC): ICD-10-CM

## 2019-11-22 DIAGNOSIS — R73.09 ELEVATED HEMOGLOBIN A1C: ICD-10-CM

## 2019-11-22 DIAGNOSIS — E03.4 HYPOTHYROIDISM DUE TO ACQUIRED ATROPHY OF THYROID: ICD-10-CM

## 2019-11-22 DIAGNOSIS — I25.10 ASHD (ARTERIOSCLEROTIC HEART DISEASE): Primary | ICD-10-CM

## 2019-11-22 DIAGNOSIS — I10 ESSENTIAL HYPERTENSION: ICD-10-CM

## 2019-11-22 PROCEDURE — G8926 SPIRO NO PERF OR DOC: HCPCS | Performed by: INTERNAL MEDICINE

## 2019-11-22 PROCEDURE — 1123F ACP DISCUSS/DSCN MKR DOCD: CPT | Performed by: INTERNAL MEDICINE

## 2019-11-22 PROCEDURE — G8417 CALC BMI ABV UP PARAM F/U: HCPCS | Performed by: INTERNAL MEDICINE

## 2019-11-22 PROCEDURE — 4004F PT TOBACCO SCREEN RCVD TLK: CPT | Performed by: INTERNAL MEDICINE

## 2019-11-22 PROCEDURE — G8427 DOCREV CUR MEDS BY ELIG CLIN: HCPCS | Performed by: INTERNAL MEDICINE

## 2019-11-22 PROCEDURE — 3023F SPIROM DOC REV: CPT | Performed by: INTERNAL MEDICINE

## 2019-11-22 PROCEDURE — G0008 ADMIN INFLUENZA VIRUS VAC: HCPCS | Performed by: INTERNAL MEDICINE

## 2019-11-22 PROCEDURE — 99214 OFFICE O/P EST MOD 30 MIN: CPT | Performed by: INTERNAL MEDICINE

## 2019-11-22 PROCEDURE — 4040F PNEUMOC VAC/ADMIN/RCVD: CPT | Performed by: INTERNAL MEDICINE

## 2019-11-22 PROCEDURE — G8598 ASA/ANTIPLAT THER USED: HCPCS | Performed by: INTERNAL MEDICINE

## 2019-11-22 PROCEDURE — G8482 FLU IMMUNIZE ORDER/ADMIN: HCPCS | Performed by: INTERNAL MEDICINE

## 2019-11-22 PROCEDURE — 90653 IIV ADJUVANT VACCINE IM: CPT | Performed by: INTERNAL MEDICINE

## 2019-12-04 ENCOUNTER — NURSE ONLY (OUTPATIENT)
Dept: INTERNAL MEDICINE CLINIC | Age: 79
End: 2019-12-04
Payer: COMMERCIAL

## 2019-12-04 DIAGNOSIS — Z79.01 LONG TERM CURRENT USE OF ANTICOAGULANT THERAPY: ICD-10-CM

## 2019-12-04 DIAGNOSIS — I48.20 CHRONIC ATRIAL FIBRILLATION (HCC): ICD-10-CM

## 2019-12-04 LAB
INTERNATIONAL NORMALIZATION RATIO, POC: 2.3
PROTHROMBIN TIME, POC: NORMAL

## 2019-12-04 PROCEDURE — 85610 PROTHROMBIN TIME: CPT | Performed by: INTERNAL MEDICINE

## 2019-12-05 ENCOUNTER — APPOINTMENT (OUTPATIENT)
Dept: GENERAL RADIOLOGY | Age: 79
End: 2019-12-05
Payer: MEDICARE

## 2019-12-05 ENCOUNTER — HOSPITAL ENCOUNTER (EMERGENCY)
Age: 79
Discharge: HOME | End: 2019-12-05
Attending: EMERGENCY MEDICINE
Payer: MEDICARE

## 2019-12-05 VITALS
SYSTOLIC BLOOD PRESSURE: 142 MMHG | HEART RATE: 61 BPM | OXYGEN SATURATION: 95 % | DIASTOLIC BLOOD PRESSURE: 109 MMHG | TEMPERATURE: 98 F | RESPIRATION RATE: 17 BRPM

## 2019-12-05 DIAGNOSIS — I95.9 HYPOTENSION, UNSPECIFIED HYPOTENSION TYPE: ICD-10-CM

## 2019-12-05 DIAGNOSIS — R42 DIZZINESS: Primary | ICD-10-CM

## 2019-12-05 LAB
ALBUMIN SERPL-MCNC: 3.8 G/DL
ALBUMIN SERPL-MCNC: 3.8 GM/DL (ref 3.4–5)
ALP BLD-CCNC: 102 IU/L (ref 40–129)
ALP BLD-CCNC: 102 U/L
ALT SERPL-CCNC: 12 U/L
ALT SERPL-CCNC: 12 U/L (ref 10–40)
ANION GAP SERPL CALCULATED.3IONS-SCNC: 11 MMOL/L
ANION GAP SERPL CALCULATED.3IONS-SCNC: 11 MMOL/L (ref 4–16)
AST SERPL-CCNC: 16 IU/L (ref 15–37)
AST SERPL-CCNC: 16 U/L
BASOPHILS ABSOLUTE: 0.1 /ΜL
BASOPHILS ABSOLUTE: 0.1 K/CU MM
BASOPHILS RELATIVE PERCENT: 0.6 %
BASOPHILS RELATIVE PERCENT: 0.6 % (ref 0–1)
BILIRUB SERPL-MCNC: 0.5 MG/DL (ref 0.1–1.4)
BILIRUB SERPL-MCNC: 0.5 MG/DL (ref 0–1)
BUN BLDV-MCNC: 22 MG/DL
BUN BLDV-MCNC: 22 MG/DL (ref 6–23)
CALCIUM SERPL-MCNC: 8.6 MG/DL
CALCIUM SERPL-MCNC: 8.6 MG/DL (ref 8.3–10.6)
CHLORIDE BLD-SCNC: 106 MMOL/L
CHLORIDE BLD-SCNC: 106 MMOL/L (ref 99–110)
CO2: 22 MMOL/L
CO2: 22 MMOL/L (ref 21–32)
CREAT SERPL-MCNC: 1.1 MG/DL
CREAT SERPL-MCNC: 1.1 MG/DL (ref 0.9–1.3)
DIFFERENTIAL TYPE: ABNORMAL
EOSINOPHILS ABSOLUTE: 0.2 /ΜL
EOSINOPHILS ABSOLUTE: 0.2 K/CU MM
EOSINOPHILS RELATIVE PERCENT: 1.8 %
EOSINOPHILS RELATIVE PERCENT: 1.8 % (ref 0–3)
GFR AFRICAN AMERICAN: >60 ML/MIN/1.73M2
GFR CALCULATED: ABNORMAL
GFR NON-AFRICAN AMERICAN: >60 ML/MIN/1.73M2
GLUCOSE BLD-MCNC: 137 MG/DL
GLUCOSE BLD-MCNC: 137 MG/DL (ref 70–99)
HCT VFR BLD CALC: 51.9 % (ref 41–53)
HCT VFR BLD CALC: 51.9 % (ref 42–52)
HEMOGLOBIN: 16.2 G/DL (ref 13.5–17.5)
HEMOGLOBIN: 16.2 GM/DL (ref 13.5–18)
IMMATURE NEUTROPHIL %: 0.3 % (ref 0–0.43)
LYMPHOCYTES ABSOLUTE: 1.7 /ΜL
LYMPHOCYTES ABSOLUTE: 1.7 K/CU MM
LYMPHOCYTES RELATIVE PERCENT: 18.4 %
LYMPHOCYTES RELATIVE PERCENT: 18.4 % (ref 24–44)
MCH RBC QN AUTO: 32.2 PG
MCH RBC QN AUTO: 32.2 PG (ref 27–31)
MCHC RBC AUTO-ENTMCNC: 31.2 % (ref 32–36)
MCHC RBC AUTO-ENTMCNC: 31.2 G/DL
MCV RBC AUTO: 103.2 FL
MCV RBC AUTO: 103.2 FL (ref 78–100)
MONOCYTES ABSOLUTE: 1.2 /ΜL
MONOCYTES ABSOLUTE: 1.2 K/CU MM
MONOCYTES RELATIVE PERCENT: 13 %
MONOCYTES RELATIVE PERCENT: 13 % (ref 0–4)
NEUTROPHILS ABSOLUTE: 6.2 /ΜL
NEUTROPHILS RELATIVE PERCENT: 65.9 %
NUCLEATED RBC %: 0 %
PDW BLD-RTO: 14.7 %
PDW BLD-RTO: 14.7 % (ref 11.7–14.9)
PLATELET # BLD: 175 K/CU MM (ref 140–440)
PLATELET # BLD: 175 K/ΜL
PMV BLD AUTO: 11.2 FL
PMV BLD AUTO: 11.2 FL (ref 7.5–11.1)
POTASSIUM SERPL-SCNC: 4 MMOL/L
POTASSIUM SERPL-SCNC: 4 MMOL/L (ref 3.5–5.1)
PRO-BNP: 568.3 PG/ML
RBC # BLD: 5.03 10^6/ΜL
RBC # BLD: 5.03 M/CU MM (ref 4.6–6.2)
SEGMENTED NEUTROPHILS ABSOLUTE COUNT: 6.2 K/CU MM
SEGMENTED NEUTROPHILS RELATIVE PERCENT: 65.9 % (ref 36–66)
SODIUM BLD-SCNC: 139 MMOL/L
SODIUM BLD-SCNC: 139 MMOL/L (ref 135–145)
TOTAL IMMATURE NEUTOROPHIL: 0.03 K/CU MM
TOTAL NUCLEATED RBC: 0 K/CU MM
TOTAL PROTEIN: 5.9
TOTAL PROTEIN: 5.9 GM/DL (ref 6.4–8.2)
TROPONIN T: <0.01 NG/ML
WBC # BLD: 9.4 10^3/ML
WBC # BLD: 9.4 K/CU MM (ref 4–10.5)

## 2019-12-05 PROCEDURE — 84484 ASSAY OF TROPONIN QUANT: CPT

## 2019-12-05 PROCEDURE — 36415 COLL VENOUS BLD VENIPUNCTURE: CPT

## 2019-12-05 PROCEDURE — 83880 ASSAY OF NATRIURETIC PEPTIDE: CPT

## 2019-12-05 PROCEDURE — 80053 COMPREHEN METABOLIC PANEL: CPT

## 2019-12-05 PROCEDURE — 85025 COMPLETE CBC W/AUTO DIFF WBC: CPT

## 2019-12-05 PROCEDURE — 99285 EMERGENCY DEPT VISIT HI MDM: CPT

## 2019-12-05 PROCEDURE — 93010 ELECTROCARDIOGRAM REPORT: CPT | Performed by: INTERNAL MEDICINE

## 2019-12-05 PROCEDURE — 71046 X-RAY EXAM CHEST 2 VIEWS: CPT

## 2019-12-05 PROCEDURE — 93005 ELECTROCARDIOGRAM TRACING: CPT | Performed by: EMERGENCY MEDICINE

## 2019-12-05 RX ORDER — TRIHEXYPHENIDYL HYDROCHLORIDE 2 MG/1
2 TABLET ORAL DAILY
COMMUNITY
End: 2020-02-25 | Stop reason: SDUPTHER

## 2019-12-05 RX ORDER — GABAPENTIN 100 MG/1
100 CAPSULE ORAL 2 TIMES DAILY
COMMUNITY
End: 2020-02-27 | Stop reason: SDUPTHER

## 2019-12-05 RX ORDER — SODIUM CHLORIDE 9 MG/ML
INJECTION, SOLUTION INTRAVENOUS
Status: DISCONTINUED
Start: 2019-12-05 | End: 2019-12-05 | Stop reason: HOSPADM

## 2019-12-05 RX ORDER — TIZANIDINE 4 MG/1
4 TABLET ORAL 2 TIMES DAILY
COMMUNITY
End: 2020-02-25

## 2019-12-08 RX ORDER — LEVOTHYROXINE SODIUM 0.03 MG/1
TABLET ORAL
Qty: 90 TABLET | Refills: 3 | Status: SHIPPED | OUTPATIENT
Start: 2019-12-08 | End: 2020-10-05 | Stop reason: SDUPTHER

## 2019-12-09 ENCOUNTER — TELEPHONE (OUTPATIENT)
Dept: INTERNAL MEDICINE CLINIC | Age: 79
End: 2019-12-09

## 2019-12-09 DIAGNOSIS — R25.1 TREMOR, UNSPECIFIED: Primary | ICD-10-CM

## 2019-12-11 ENCOUNTER — PROCEDURE VISIT (OUTPATIENT)
Dept: CARDIOLOGY CLINIC | Age: 79
End: 2019-12-11
Payer: MEDICARE

## 2019-12-11 VITALS — HEART RATE: 68 BPM | SYSTOLIC BLOOD PRESSURE: 136 MMHG | DIASTOLIC BLOOD PRESSURE: 88 MMHG

## 2019-12-11 DIAGNOSIS — Z95.0 CARDIAC PACEMAKER IN SITU: Primary | ICD-10-CM

## 2019-12-11 PROCEDURE — 93279 PRGRMG DEV EVAL PM/LDLS PM: CPT | Performed by: INTERNAL MEDICINE

## 2019-12-12 LAB
EKG DIAGNOSIS: NORMAL
EKG Q-T INTERVAL: 448 MS
EKG QRS DURATION: 122 MS
EKG QTC CALCULATION (BAZETT): 476 MS
EKG R AXIS: -73 DEGREES
EKG T AXIS: 88 DEGREES
EKG VENTRICULAR RATE: 68 BPM

## 2019-12-19 DIAGNOSIS — F41.9 ANXIETY: ICD-10-CM

## 2019-12-19 RX ORDER — CLONAZEPAM 0.5 MG/1
TABLET ORAL
Qty: 90 TABLET | Refills: 0 | Status: SHIPPED | OUTPATIENT
Start: 2019-12-19 | End: 2020-04-20 | Stop reason: CLARIF

## 2020-01-02 ENCOUNTER — NURSE ONLY (OUTPATIENT)
Dept: INTERNAL MEDICINE CLINIC | Age: 80
End: 2020-01-02

## 2020-01-02 LAB
INTERNATIONAL NORMALIZATION RATIO, POC: 2.7
PROTHROMBIN TIME, POC: NORMAL

## 2020-01-02 NOTE — PROGRESS NOTES
Julissa Arriola presented to the office to have fingerstick INR performed-used left hand middle finger with good blood return and tolerated well-results will be forwarded to Dr Megan Whalen for review and directions

## 2020-01-14 NOTE — TELEPHONE ENCOUNTER
Patient got letter that Christina Milagro is no longer covered and would need to change to [employer].      If this is ok with you please sign the order

## 2020-01-31 ENCOUNTER — OFFICE VISIT (OUTPATIENT)
Dept: CARDIOLOGY CLINIC | Age: 80
End: 2020-01-31
Payer: MEDICARE

## 2020-01-31 VITALS
DIASTOLIC BLOOD PRESSURE: 72 MMHG | HEIGHT: 70 IN | HEART RATE: 70 BPM | SYSTOLIC BLOOD PRESSURE: 100 MMHG | WEIGHT: 226.4 LBS | BODY MASS INDEX: 32.41 KG/M2

## 2020-01-31 PROCEDURE — 99214 OFFICE O/P EST MOD 30 MIN: CPT | Performed by: INTERNAL MEDICINE

## 2020-01-31 PROCEDURE — 4040F PNEUMOC VAC/ADMIN/RCVD: CPT | Performed by: INTERNAL MEDICINE

## 2020-01-31 PROCEDURE — G8417 CALC BMI ABV UP PARAM F/U: HCPCS | Performed by: INTERNAL MEDICINE

## 2020-01-31 PROCEDURE — 4004F PT TOBACCO SCREEN RCVD TLK: CPT | Performed by: INTERNAL MEDICINE

## 2020-01-31 PROCEDURE — 1123F ACP DISCUSS/DSCN MKR DOCD: CPT | Performed by: INTERNAL MEDICINE

## 2020-01-31 PROCEDURE — G8427 DOCREV CUR MEDS BY ELIG CLIN: HCPCS | Performed by: INTERNAL MEDICINE

## 2020-01-31 PROCEDURE — G8482 FLU IMMUNIZE ORDER/ADMIN: HCPCS | Performed by: INTERNAL MEDICINE

## 2020-01-31 NOTE — PROGRESS NOTES
Brittany Vivar MD        OFFICE  FOLLOWUP NOTE    Chief complaints: patient is here for management of CAD, PPM, HTN, AFIB, DYSLPIDEMIA    Subjective: patient feels better, no chest pain, +shortness of breath,+ dizziness, no palpitations    HPI Yumiko Pat is a 78 y. o.year old who  has a past medical history of Acid reflux, Adenomatous polyp of colon, Anticoagulated, Arthritis, CAD (coronary artery disease), Chronic atrial fibrillation, COPD (chronic obstructive pulmonary disease) ([facility] [address] Diverticulitis, Diverticulosis of colon, Enlarged prostate, H/O cardiac catheterization, H/O cardiovascular stress test, H/O cardiovascular stress test, H/O cardiovascular stress test, H/O Doppler Carotid ultrasound, H/O Doppler ultrasound, H/O echocardiogram, H/O echocardiogram, H/O echocardiogram, H/O percutaneous left heart catheterization, H/O unstable angina, History of Doppler ultrasound, History of Doppler ultrasound, History of Holter monitoring, Hx of CT scan of chest, HX OTHER MEDICAL, Hyperlipidemia, Hypertension, Kidney stones, Nephrolithiasis, Nocturia, Pacemaker, Peptic ulcer, unspecified site, unspecified as acute or chronic, without mention of hemorrhage or perforation, Pneumonia, Shortness of breath, Skin Cancer Arms And Ears, Sleep apnea, Stented coronary artery, Teeth missing, Wears dentures, and Wears glasses.  and presents for management of CAD, PPM, HTN, AFIB, DYSLPIDEMIA, which are well controlled      Current Outpatient Medications   Medication Sig Dispense Refill    alirocumab (PRALUENT) 75 MG/ML SOSY injection prefilled syringe Inject 1 mL into the skin every 14 days 6 Syringe 3    clonazePAM (KLONOPIN) 0.5 MG tablet TAKE 1 TABLET BY MOUTH AT BEDTIME AS NEEDED FOR ANXIETY 90 tablet 0    levothyroxine (SYNTHROID) 25 MCG tablet TAKE 1 TABLET BY MOUTH EVERY DAY 90 tablet 3    Evolocumab (REPATHA) 140 MG/ML SOSY Inject 140 mg into the skin every 30 days      gabapentin (NEURONTIN) 100 MG capsule Take 100 mg by mouth 2 times daily.  trihexyphenidyl (ARTANE) 2 MG tablet Take 2 mg by mouth daily      tiZANidine (ZANAFLEX) 4 MG tablet Take 4 mg by mouth 2 times daily      MAGNESIUM CHLORIDE PO Take 1 each by mouth daily       mupirocin (BACTROBAN) 2 % ointment Apply topically 3 times daily. 1 Tube 2    propranolol (INDERAL) 20 MG tablet Take 1 tablet by mouth 3 times daily 270 tablet 3    nizatidine (AXID) 150 MG capsule TAKE 1 CAPSULE DAILY 90 capsule 3    WARFARIN SODIUM PO Take by mouth nightly 1 mg every Mon and Fri and 2 mg on all other days of the week      fluticasone-salmeterol (ADVAIR HFA) 230-21 MCG/ACT inhaler Inhale 2 puffs into the lungs 2 times daily 3 Inhaler 3    aspirin 81 MG EC tablet Take 1 tablet by mouth daily 30 tablet 3    Multiple Vitamins-Minerals (CENTRUM PO) Take 1 tablet by mouth nightly Over The Counter       Cholecalciferol (VITAMIN D PO) Take 2,000 Units by mouth 2 times daily Over The Counter       amLODIPine (NORVASC) 10 MG tablet TAKE 1 TABLET NIGHTLY (Patient not taking: Reported on 1/31/2020) 90 tablet 3     No current facility-administered medications for this visit. Allergies: Other;  Atorvastatin; Flomax [tamsulosin hcl]; and Proscar [finasteride]  Past Medical History:   Diagnosis Date    Acid reflux     Adenomatous polyp of colon 1-2009    last colonoscopy recomm 3 yr follow up    Anticoagulated 2010    takes for afib Dr Magno Floyd monitors INR 1.2 on 02/08/2018    Arthritis     \"arthritis in low back\"    CAD (coronary artery disease)     follow with dr Arabella Yu- cardiac clearance for surgery done 10/31/2017 on chart)    Chronic atrial fibrillation     Sees Dr. Jacquie Foley COPD (chronic obstructive pulmonary disease) ([facility] [address]     follows with dr Shelby Quinteros Diverticulitis     Diverticulosis of colon     left colon    Enlarged prostate     \"had to ream me out a couple of times\":    H/O cardiac catheterization 1/31/2006    R codominant, RCA patent, LAD unspecified site, unspecified as acute or chronic, without mention of hemorrhage or perforation 1983    per EGD    Pneumonia Dx 2000's    Shortness of breath     Skin Cancer Arms And Ears In Past    Excision Skin Cancer Arms And Ears    Sleep apnea     No CPAP- had sleep study done 10+ yrs ago    Stented coronary artery 5/12/16    3.0 X 12 MM ELIGIO stent to OM 1    Teeth missing     Lower    Wears dentures     Full Lower    Wears glasses      Past Surgical History:   Procedure Laterality Date   [facility] CARDIAC PACEMAKER PLACEMENT  5-3-99, 1-29-14    Medtronic Sensia SR Pacemaker Implanted    COLONOSCOPY  8-12 - Dr Heather Montilla    Polyps Removed In Past    CORONARY ANGIOPLASTY WITH STENT PLACEMENT      per old chart had cath with stent placement done 5/2016    CYSTOSCOPY Left 11/17/2017    cysto, left retrograde, flugeration of prostate    DENTAL SURGERY      Teeth Extracted In Past    ENDOSCOPY, COLON, DIAGNOSTIC  1983    KNEE SURGERY Right 1966    \"shaved the kneecap\"    LITHOTRIPSY  2009, 2014    Kidney Stones    PACEMAKER PLACEMENT  5-3-99    Medtronic Imperial SR Pacemaker Implanted    PACEMAKER PLACEMENT  1-29-14    Medtronic Imperial SR Pacemaker Implanted( had to replace the whole thing in 2014- that was my 2rd one - first one in [address] [place] [address] and next one 2007\"    SKIN CANCER EXCISION  In Past    Skin Cancer Excised Arms And Ears     Family History   Problem Relation Age of Onset    Heart Disease Mother     Cancer Father 76        Leukemia    Heart Disease Father         Atrial Fib    Cancer Sister         Leukemia    Cancer Brother         Leukemia     Social History     Tobacco Use    Smoking status: Current Every Day Smoker     Packs/day: 0.50     Years: 67.00     Pack years: 33.50     Types: Cigarettes     Start date: 8/14/1948    Smokeless tobacco: Never Used   Substance Use Topics    Alcohol use: No     Alcohol/week: 0.0 standard drinks      [unfilled]  Review of Systems:   · Constitutional: No Fever or arteries pulse and amplitude are normal no bruit, pedal pulses are normal  Femoral pulses have normal amplitude, no bruits   Extremities - No cyanosis, clubbing, or significant edema, no varicose veins    Abdomen - No masses, tenderness, or organomegaly, no hepato-splenomegally, no bruits  Musculoskeletal - No significant edema, no kyphosis or scoliosis, no deformity in any extremity noted, muscle strength and tone are normal  Skin: no ulcer,no scar,no stasis dermatitis   Neurologic - alert oriented times 3,Cranial nerves II through XII are grossly intact. There were no gross focal neurologic abnormalities. All sensory and motor nerves examined and were normal  Psychiatric: normal mood and affect    Lab Results   Component Value Date    CKTOTAL 44 11/04/2019    TROPONINI 0.020 12/04/2012     BNP:    Lab Results   Component Value Date    BNP 83.4 10/18/2010     PT/INR:  No results found for: PTINR  Lab Results   Component Value Date    LABA1C 6.2 11/04/2019    LABA1C 6.4 08/13/2019     Lab Results   Component Value Date    CHOL 158 11/04/2019    TRIG 140 11/04/2019    HDL 54 11/04/2019    LDLCALC 76 11/04/2019    LDLDIRECT 101 (H) 10/31/2018     Lab Results   Component Value Date    ALT 12 12/05/2019    AST 16 12/05/2019     TSH:    Lab Results   Component Value Date    TSH 3.87 11/04/2019       Impression:  Alexandria Bagley is a 78 y. o.year old who  has a past medical history of Acid reflux, Adenomatous polyp of colon, Anticoagulated, Arthritis, CAD (coronary artery disease), Chronic atrial fibrillation, COPD (chronic obstructive pulmonary disease) ([facility] [address] Diverticulitis, Diverticulosis of colon, Enlarged prostate, H/O cardiac catheterization, H/O cardiovascular stress test, H/O cardiovascular stress test, H/O cardiovascular stress test, H/O Doppler Carotid ultrasound, H/O Doppler ultrasound, H/O echocardiogram, H/O echocardiogram, H/O echocardiogram, H/O percutaneous left heart catheterization, H/O unstable angina, History of Doppler ultrasound, History of Doppler ultrasound, History of Holter monitoring, Hx of CT scan of chest, HX OTHER MEDICAL, Hyperlipidemia, Hypertension, Kidney stones, Nephrolithiasis, Nocturia, Pacemaker, Peptic ulcer, unspecified site, unspecified as acute or chronic, without mention of hemorrhage or perforation, Pneumonia, Shortness of breath, Skin Cancer Arms And Ears, Sleep apnea, Stented coronary artery, Teeth missing, Wears dentures, and Wears glasses. and presents with     Plan:  1. Shortness of breath: stress test ordered, it could he COPD related however, he is not wheezing  2. Dizziness:seen by neurologist, mild carotid disease 0-49%. He will get 2nd opinion, it could be drop in blood pressure related  3. HTN: better, he has stopped norvasc  4. CKD: patient will see nephrologist  5. CAD:stable Continue aspirin, statins were discontinued as it was causing myalgia, continue, betablockers  6. Paroxysmal afib: on coumadin  7. PPM: checked, normal function. 20 MONTHS  BATTERY left  8. Backpain: stable. 9. Dyslipidemia:patient could not tolerate 4 statins, on repatha now  COPD: stable, alreadty quit smokingHealth All labs, medications All labs, medications and tests reviewed, continue all other medications of all above medical condition listed as is.     [unfilled]

## 2020-02-03 ENCOUNTER — NURSE ONLY (OUTPATIENT)
Dept: INTERNAL MEDICINE CLINIC | Age: 80
End: 2020-02-03
Payer: MEDICARE

## 2020-02-03 LAB
INTERNATIONAL NORMALIZATION RATIO, POC: 2.3
PROTHROMBIN TIME, POC: NORMAL

## 2020-02-03 PROCEDURE — 85610 PROTHROMBIN TIME: CPT | Performed by: FAMILY MEDICINE

## 2020-02-03 ASSESSMENT — PATIENT HEALTH QUESTIONNAIRE - PHQ9
SUM OF ALL RESPONSES TO PHQ QUESTIONS 1-9: 0
2. FEELING DOWN, DEPRESSED OR HOPELESS: 0
SUM OF ALL RESPONSES TO PHQ9 QUESTIONS 1 & 2: 0
SUM OF ALL RESPONSES TO PHQ QUESTIONS 1-9: 0
1. LITTLE INTEREST OR PLEASURE IN DOING THINGS: 0

## 2020-02-03 NOTE — PROGRESS NOTES
Benji Sibley presented to the office to have fingerstick INR performed-used left hand middle finger with good blood return and tolerated well-results will be forwarded to Dr Guera Doss for review and directions

## 2020-02-04 ENCOUNTER — PROCEDURE VISIT (OUTPATIENT)
Dept: CARDIOLOGY CLINIC | Age: 80
End: 2020-02-04
Payer: MEDICARE

## 2020-02-04 LAB
LV EF: 62 %
LVEF MODALITY: NORMAL

## 2020-02-04 PROCEDURE — 78452 HT MUSCLE IMAGE SPECT MULT: CPT | Performed by: INTERNAL MEDICINE

## 2020-02-04 PROCEDURE — 93018 CV STRESS TEST I&R ONLY: CPT | Performed by: INTERNAL MEDICINE

## 2020-02-04 PROCEDURE — 93016 CV STRESS TEST SUPVJ ONLY: CPT | Performed by: INTERNAL MEDICINE

## 2020-02-04 PROCEDURE — A9500 TC99M SESTAMIBI: HCPCS | Performed by: INTERNAL MEDICINE

## 2020-02-04 PROCEDURE — 93017 CV STRESS TEST TRACING ONLY: CPT | Performed by: INTERNAL MEDICINE

## 2020-02-06 ENCOUNTER — TELEPHONE (OUTPATIENT)
Dept: CARDIOLOGY CLINIC | Age: 80
End: 2020-02-06

## 2020-02-18 RX ORDER — NIZATIDINE 150 MG/1
CAPSULE ORAL
Qty: 90 CAPSULE | Refills: 3 | Status: SHIPPED | OUTPATIENT
Start: 2020-02-18 | End: 2020-03-03 | Stop reason: SDUPTHER

## 2020-02-19 RX ORDER — BACLOFEN 10 MG/1
10 TABLET ORAL 2 TIMES DAILY
COMMUNITY
End: 2020-02-25

## 2020-02-25 ENCOUNTER — OFFICE VISIT (OUTPATIENT)
Dept: INTERNAL MEDICINE CLINIC | Age: 80
End: 2020-02-25
Payer: MEDICARE

## 2020-02-25 VITALS
BODY MASS INDEX: 31.85 KG/M2 | WEIGHT: 222 LBS | OXYGEN SATURATION: 89 % | SYSTOLIC BLOOD PRESSURE: 92 MMHG | DIASTOLIC BLOOD PRESSURE: 50 MMHG | HEART RATE: 72 BPM

## 2020-02-25 PROBLEM — R10.9 ABDOMINAL PAIN: Status: RESOLVED | Noted: 2018-05-30 | Resolved: 2020-02-25

## 2020-02-25 PROCEDURE — 3023F SPIROM DOC REV: CPT | Performed by: FAMILY MEDICINE

## 2020-02-25 PROCEDURE — 4040F PNEUMOC VAC/ADMIN/RCVD: CPT | Performed by: FAMILY MEDICINE

## 2020-02-25 PROCEDURE — G8417 CALC BMI ABV UP PARAM F/U: HCPCS | Performed by: FAMILY MEDICINE

## 2020-02-25 PROCEDURE — 4004F PT TOBACCO SCREEN RCVD TLK: CPT | Performed by: FAMILY MEDICINE

## 2020-02-25 PROCEDURE — G8427 DOCREV CUR MEDS BY ELIG CLIN: HCPCS | Performed by: FAMILY MEDICINE

## 2020-02-25 PROCEDURE — G8482 FLU IMMUNIZE ORDER/ADMIN: HCPCS | Performed by: FAMILY MEDICINE

## 2020-02-25 PROCEDURE — G8926 SPIRO NO PERF OR DOC: HCPCS | Performed by: FAMILY MEDICINE

## 2020-02-25 PROCEDURE — 96372 THER/PROPH/DIAG INJ SC/IM: CPT | Performed by: FAMILY MEDICINE

## 2020-02-25 PROCEDURE — 1123F ACP DISCUSS/DSCN MKR DOCD: CPT | Performed by: FAMILY MEDICINE

## 2020-02-25 PROCEDURE — 99204 OFFICE O/P NEW MOD 45 MIN: CPT | Performed by: FAMILY MEDICINE

## 2020-02-25 RX ORDER — CYANOCOBALAMIN 1000 UG/ML
1000 INJECTION INTRAMUSCULAR; SUBCUTANEOUS ONCE
Status: COMPLETED | OUTPATIENT
Start: 2020-02-25 | End: 2020-02-25

## 2020-02-25 RX ORDER — TRIHEXYPHENIDYL HYDROCHLORIDE 2 MG/1
2 TABLET ORAL DAILY
Qty: 30 TABLET | Refills: 3 | Status: SHIPPED | OUTPATIENT
Start: 2020-02-25 | End: 2020-06-12

## 2020-02-25 RX ORDER — CLOTRIMAZOLE AND BETAMETHASONE DIPROPIONATE 10; .64 MG/G; MG/G
CREAM TOPICAL
Qty: 1 G | Refills: 1 | Status: SHIPPED | OUTPATIENT
Start: 2020-02-25 | End: 2020-04-30

## 2020-02-25 RX ADMIN — CYANOCOBALAMIN 1000 MCG: 1000 INJECTION INTRAMUSCULAR; SUBCUTANEOUS at 11:49

## 2020-02-25 NOTE — PROGRESS NOTES
Subjective:      Chief Complaint: Establish care, insomnia, COPD, restless leg,    HPI:  Omega Allen is a 78 y.o. male who presents today for below chronic complaint:    Rash: Patient presented with 1 week history of rash over his upper chest and left upper extremity. Rash is erythematous without any itching. It is dry, erythematous, isolated, ringlike formation, located on upper chest and left upper extremity. Total 2 in number. A. fib: Currently on Coumadin and propanolol. Denies having any palpitation, chest pain or shortness of breath. Chronic insomnia: Patient takes nightly clonazepam to help him sleep at night. Without medication, clonazepam, patient is unable to sleep at night. However, he still wakes a few times at night. Discontinuation plan: Agreed with the patient to continue his medication on as-needed basis. Patient agreed to taper down his medication. Pain plan was provided to the patient. COPD: Patient breathing stable on home medication Advair and as needed albuterol. Patient continues to smoke about half pack of cigarettes per day. Patient denies having any resting chest pain or chest tightness or shortness of breath. However, he has exertional shortness of breath. Currently not on oxygen. Patient denies having any fever or chills. Hypothyroidism: Patient is currently taking levothyroxine 25 mcg 1 tablet daily. Patient denies having any hypo-or hyperthyroidism symptoms    Right lower extremity tremor: Patient is using Artane and propranolol to control his tremors. Patient symptoms started about 5 years ago and is seen multiple neurologist without any specific diagnosis. Patient symptoms are slightly getting worse. GERD: Patient currently taking tizanidine to control his symptoms. Patient denies having any epigastric pain or dyspepsia or change in stool color or consistency.       Patient Active Problem List   Diagnosis    Hyperlipidemia    Vitamin D deficiency Neck:      Musculoskeletal: Normal range of motion and neck supple. Thyroid: No thyromegaly. Cardiovascular:      Rate and Rhythm: Normal rate and regular rhythm. Heart sounds: Normal heart sounds. Pulmonary:      Effort: Pulmonary effort is normal.      Breath sounds: Wheezing present. Abdominal:      General: Bowel sounds are normal. There is no distension. Palpations: Abdomen is soft. Tenderness: There is no abdominal tenderness. There is no guarding. Musculoskeletal: Normal range of motion. Right lower leg: No edema. Left lower leg: No edema. Comments: 5-5 muscular strength throughout and bilateral.  Disbalance gait increased risk of fall   Skin:     General: Skin is warm and dry. Findings: Rash present. Comments: Erythematous rash over upper chest and left upper extremity   Neurological:      General: No focal deficit present. Mental Status: He is alert and oriented to person, place, and time. Comments: Right lower extremity resting tremors   Psychiatric:         Mood and Affect: Mood normal.         Behavior: Behavior normal.         Thought Content: Thought content normal.         Judgment: Judgment normal.            Assessment / Plan:      1. Tinea corporis  - Offered patient topical cream and Lotrisone. - clotrimazole-betamethasone (LOTRISONE) 1-0.05 % cream; Apply topically 2 times daily. Dispense: 1 g; Refill: 1    2. Chronic atrial fibrillation  -Stable on chronic warfarin therapy and propranolol. 3. Moderate COPD (chronic obstructive pulmonary disease) (HCC)  -Stable on Advair and albuterol as needed. 4. Chronic insomnia  - Continue patient medication clonazepam per discontinuation plan below    Discontinuation plan: Agreed with the patient to continue his medication on as-needed basis. Patient agreed to taper down his medication. Pain plan was provided to the patient.     5. Resting tremor  -Stable and continue current home

## 2020-02-26 ASSESSMENT — ENCOUNTER SYMPTOMS
SORE THROAT: 0
CHEST TIGHTNESS: 0
ABDOMINAL PAIN: 0
BACK PAIN: 0
COUGH: 0
DIARRHEA: 0
EYE ITCHING: 0
SHORTNESS OF BREATH: 1
EYE REDNESS: 0
VOMITING: 0
WHEEZING: 0
SINUS PAIN: 0
NAUSEA: 0
TROUBLE SWALLOWING: 0
ABDOMINAL DISTENTION: 0
CONSTIPATION: 0

## 2020-02-27 ENCOUNTER — TELEPHONE (OUTPATIENT)
Dept: INTERNAL MEDICINE CLINIC | Age: 80
End: 2020-02-27

## 2020-02-27 NOTE — TELEPHONE ENCOUNTER
He has a script from [facility], pt states that john will not call back to refill. Gabapentin  And propanol is the meds, said went over with Dr Hortensia Farris and he does want him to stay on, would he refill?   Could you please call his wife donnell

## 2020-02-28 RX ORDER — PROPRANOLOL HYDROCHLORIDE 20 MG/1
20 TABLET ORAL 3 TIMES DAILY
Qty: 270 TABLET | Refills: 3 | Status: SHIPPED | OUTPATIENT
Start: 2020-02-28 | End: 2020-04-20

## 2020-02-28 RX ORDER — GABAPENTIN 100 MG/1
100 CAPSULE ORAL 2 TIMES DAILY
Qty: 180 CAPSULE | Refills: 2 | Status: SHIPPED | OUTPATIENT
Start: 2020-02-28 | End: 2020-02-28 | Stop reason: SDUPTHER

## 2020-02-28 RX ORDER — PROPRANOLOL HYDROCHLORIDE 20 MG/1
20 TABLET ORAL 3 TIMES DAILY
Qty: 270 TABLET | Refills: 3 | Status: SHIPPED | OUTPATIENT
Start: 2020-02-28 | End: 2020-02-28 | Stop reason: SDUPTHER

## 2020-02-28 RX ORDER — GABAPENTIN 100 MG/1
100 CAPSULE ORAL 2 TIMES DAILY
Qty: 180 CAPSULE | Refills: 2 | Status: SHIPPED | OUTPATIENT
Start: 2020-02-28 | End: 2020-05-08 | Stop reason: SDUPTHER

## 2020-02-29 PROBLEM — R42 LIGHTHEADEDNESS: Status: RESOLVED | Noted: 2017-05-18 | Resolved: 2020-02-29

## 2020-02-29 PROBLEM — N40.0 HYPERTROPHY OF PROSTATE WITHOUT URINARY OBSTRUCTION AND OTHER LOWER URINARY TRACT SYMPTOMS (LUTS): Status: RESOLVED | Noted: 2018-05-30 | Resolved: 2020-02-29

## 2020-02-29 PROBLEM — N20.1 URETERAL STONE: Status: RESOLVED | Noted: 2017-11-17 | Resolved: 2020-02-29

## 2020-02-29 PROBLEM — R31.9 HEMATURIA: Status: RESOLVED | Noted: 2018-05-30 | Resolved: 2020-02-29

## 2020-02-29 PROBLEM — F51.04 CHRONIC INSOMNIA: Status: ACTIVE | Noted: 2020-02-29

## 2020-02-29 PROBLEM — R31.0 GROSS HEMATURIA: Status: RESOLVED | Noted: 2017-11-17 | Resolved: 2020-02-29

## 2020-02-29 PROBLEM — E03.4 HYPOTHYROIDISM DUE TO ACQUIRED ATROPHY OF THYROID: Status: ACTIVE | Noted: 2020-02-29

## 2020-02-29 PROBLEM — K21.9 GASTROESOPHAGEAL REFLUX DISEASE WITHOUT ESOPHAGITIS: Status: ACTIVE | Noted: 2020-02-29

## 2020-02-29 PROBLEM — M62.838 CERVICAL PARASPINAL MUSCLE SPASM: Status: RESOLVED | Noted: 2017-05-18 | Resolved: 2020-02-29

## 2020-02-29 PROBLEM — R42 DIZZINESS: Status: RESOLVED | Noted: 2017-05-18 | Resolved: 2020-02-29

## 2020-03-02 ENCOUNTER — TELEPHONE (OUTPATIENT)
Dept: INTERNAL MEDICINE CLINIC | Age: 80
End: 2020-03-02

## 2020-03-03 RX ORDER — NIZATIDINE 150 MG/1
CAPSULE ORAL
Qty: 90 CAPSULE | Refills: 3 | Status: SHIPPED | OUTPATIENT
Start: 2020-03-03 | End: 2020-03-04 | Stop reason: SDUPTHER

## 2020-03-04 RX ORDER — NIZATIDINE 150 MG/1
CAPSULE ORAL
Qty: 90 CAPSULE | Refills: 3 | Status: SHIPPED | OUTPATIENT
Start: 2020-03-04 | End: 2020-03-21 | Stop reason: SDUPTHER

## 2020-03-05 ENCOUNTER — NURSE ONLY (OUTPATIENT)
Dept: INTERNAL MEDICINE CLINIC | Age: 80
End: 2020-03-05
Payer: MEDICARE

## 2020-03-05 LAB
INTERNATIONAL NORMALIZATION RATIO, POC: 2
INTERNATIONAL NORMALIZATION RATIO, POC: NORMAL
PROTHROMBIN TIME, POC: 2

## 2020-03-05 PROCEDURE — 85610 PROTHROMBIN TIME: CPT | Performed by: FAMILY MEDICINE

## 2020-03-12 ENCOUNTER — PROCEDURE VISIT (OUTPATIENT)
Dept: CARDIOLOGY CLINIC | Age: 80
End: 2020-03-12
Payer: MEDICARE

## 2020-03-12 VITALS — SYSTOLIC BLOOD PRESSURE: 124 MMHG | HEART RATE: 72 BPM | DIASTOLIC BLOOD PRESSURE: 78 MMHG

## 2020-03-12 PROCEDURE — 93279 PRGRMG DEV EVAL PM/LDLS PM: CPT | Performed by: INTERNAL MEDICINE

## 2020-03-12 NOTE — PROCEDURES
Ko Mckeonchetannt  78 y.o., male  1940    Merrill Penny Search, DO    Chief Complaint   Patient presents with    Procedure     Pacemaker check     Vitals:    03/12/20 0917   BP: 124/78   Pulse: 72     Pacer analysis is reviewed and filed in the pacer chart. Analysis is consistent with normal single-chamber non-MRI Medtronic pacer function with stable right ventricular lead and appropriate battery status for the age of the device. Remaining average battery longevity is 18 months. Pacer is  99% pacing in the right ventricle. Recommend continued every three month pacer check and follow up office visit as scheduled.     Yesenia Moreno MD, 3/12/2020 1:48 PM

## 2020-03-20 ENCOUNTER — TELEPHONE (OUTPATIENT)
Dept: INTERNAL MEDICINE CLINIC | Age: 80
End: 2020-03-20

## 2020-03-21 RX ORDER — NIZATIDINE 150 MG/1
CAPSULE ORAL
Qty: 90 CAPSULE | Refills: 3 | Status: SHIPPED | OUTPATIENT
Start: 2020-03-21 | End: 2020-03-31

## 2020-03-25 ENCOUNTER — TELEPHONE (OUTPATIENT)
Dept: CARDIOLOGY CLINIC | Age: 80
End: 2020-03-25

## 2020-03-30 ENCOUNTER — TELEPHONE (OUTPATIENT)
Dept: INTERNAL MEDICINE CLINIC | Age: 80
End: 2020-03-30

## 2020-03-30 NOTE — TELEPHONE ENCOUNTER
Patient's wife called said medication nizatidine (AXID) 150 MG capsule     Is on back order till June. She wants to know if this can be resent to [facility] or something else be sent in since this is on backorder.  Thanks

## 2020-03-31 RX ORDER — FAMOTIDINE 20 MG/1
10 TABLET, FILM COATED ORAL 2 TIMES DAILY
Qty: 180 TABLET | Refills: 1 | Status: SHIPPED | OUTPATIENT
Start: 2020-03-31 | End: 2021-07-06 | Stop reason: SDUPTHER

## 2020-04-15 ENCOUNTER — OFFICE VISIT (OUTPATIENT)
Dept: INTERNAL MEDICINE CLINIC | Age: 80
End: 2020-04-15
Payer: MEDICARE

## 2020-04-15 VITALS
HEART RATE: 70 BPM | WEIGHT: 224 LBS | BODY MASS INDEX: 32.07 KG/M2 | HEIGHT: 70 IN | RESPIRATION RATE: 20 BRPM | DIASTOLIC BLOOD PRESSURE: 61 MMHG | SYSTOLIC BLOOD PRESSURE: 110 MMHG | OXYGEN SATURATION: 96 %

## 2020-04-15 PROCEDURE — 99213 OFFICE O/P EST LOW 20 MIN: CPT | Performed by: NURSE PRACTITIONER

## 2020-04-15 PROCEDURE — G8417 CALC BMI ABV UP PARAM F/U: HCPCS | Performed by: NURSE PRACTITIONER

## 2020-04-15 PROCEDURE — 93000 ELECTROCARDIOGRAM COMPLETE: CPT | Performed by: NURSE PRACTITIONER

## 2020-04-15 PROCEDURE — 1123F ACP DISCUSS/DSCN MKR DOCD: CPT | Performed by: NURSE PRACTITIONER

## 2020-04-15 PROCEDURE — 4004F PT TOBACCO SCREEN RCVD TLK: CPT | Performed by: NURSE PRACTITIONER

## 2020-04-15 PROCEDURE — 4040F PNEUMOC VAC/ADMIN/RCVD: CPT | Performed by: NURSE PRACTITIONER

## 2020-04-15 PROCEDURE — G8427 DOCREV CUR MEDS BY ELIG CLIN: HCPCS | Performed by: NURSE PRACTITIONER

## 2020-04-15 ASSESSMENT — ENCOUNTER SYMPTOMS
ABDOMINAL DISTENTION: 0
SINUS PRESSURE: 0
NAUSEA: 0
SORE THROAT: 0
CONSTIPATION: 0
DIARRHEA: 0
COUGH: 0
ABDOMINAL PAIN: 0
COLOR CHANGE: 0
WHEEZING: 0
EYES NEGATIVE: 1
SHORTNESS OF BREATH: 0
SINUS PAIN: 0
CHEST TIGHTNESS: 0

## 2020-04-15 NOTE — PROGRESS NOTES
discussed above. Course of treatment, including any medications, possible imaging, referrals, and follow ups discussed with patient. All risks and benefits and possible side effects discussed with patient who agrees to plan of care and verbalizes understanding. All labs and imaging reviewed. RX Monitoring 12/19/2019   Attestation -   Periodic Controlled Substance Monitoring No signs of potential drug abuse or diversion identified. No follow-ups on file.

## 2020-04-16 ENCOUNTER — TELEPHONE (OUTPATIENT)
Dept: INTERNAL MEDICINE CLINIC | Age: 80
End: 2020-04-16

## 2020-04-20 ENCOUNTER — OFFICE VISIT (OUTPATIENT)
Dept: INTERNAL MEDICINE CLINIC | Age: 80
End: 2020-04-20
Payer: MEDICARE

## 2020-04-20 VITALS
SYSTOLIC BLOOD PRESSURE: 129 MMHG | BODY MASS INDEX: 31.38 KG/M2 | RESPIRATION RATE: 20 BRPM | OXYGEN SATURATION: 97 % | WEIGHT: 219.2 LBS | HEART RATE: 67 BPM | HEIGHT: 70 IN | DIASTOLIC BLOOD PRESSURE: 71 MMHG

## 2020-04-20 PROCEDURE — 4040F PNEUMOC VAC/ADMIN/RCVD: CPT | Performed by: NURSE PRACTITIONER

## 2020-04-20 PROCEDURE — G8417 CALC BMI ABV UP PARAM F/U: HCPCS | Performed by: NURSE PRACTITIONER

## 2020-04-20 PROCEDURE — 99214 OFFICE O/P EST MOD 30 MIN: CPT | Performed by: NURSE PRACTITIONER

## 2020-04-20 PROCEDURE — G8427 DOCREV CUR MEDS BY ELIG CLIN: HCPCS | Performed by: NURSE PRACTITIONER

## 2020-04-20 PROCEDURE — 1123F ACP DISCUSS/DSCN MKR DOCD: CPT | Performed by: NURSE PRACTITIONER

## 2020-04-20 PROCEDURE — 4004F PT TOBACCO SCREEN RCVD TLK: CPT | Performed by: NURSE PRACTITIONER

## 2020-04-20 ASSESSMENT — ENCOUNTER SYMPTOMS
ABDOMINAL DISTENTION: 0
SINUS PAIN: 0
WHEEZING: 0
COLOR CHANGE: 0
EYES NEGATIVE: 1
NAUSEA: 0
SHORTNESS OF BREATH: 1
DIARRHEA: 0
CONSTIPATION: 0
CHEST TIGHTNESS: 0
SINUS PRESSURE: 0
COUGH: 0
ABDOMINAL PAIN: 0
SORE THROAT: 0

## 2020-04-30 ENCOUNTER — OFFICE VISIT (OUTPATIENT)
Dept: CARDIOLOGY CLINIC | Age: 80
End: 2020-04-30
Payer: MEDICARE

## 2020-04-30 VITALS
DIASTOLIC BLOOD PRESSURE: 60 MMHG | SYSTOLIC BLOOD PRESSURE: 98 MMHG | HEART RATE: 64 BPM | WEIGHT: 222 LBS | BODY MASS INDEX: 31.78 KG/M2 | HEIGHT: 70 IN

## 2020-04-30 PROCEDURE — 99215 OFFICE O/P EST HI 40 MIN: CPT | Performed by: INTERNAL MEDICINE

## 2020-04-30 PROCEDURE — G8427 DOCREV CUR MEDS BY ELIG CLIN: HCPCS | Performed by: INTERNAL MEDICINE

## 2020-04-30 PROCEDURE — 4040F PNEUMOC VAC/ADMIN/RCVD: CPT | Performed by: INTERNAL MEDICINE

## 2020-04-30 PROCEDURE — G8417 CALC BMI ABV UP PARAM F/U: HCPCS | Performed by: INTERNAL MEDICINE

## 2020-04-30 PROCEDURE — 1123F ACP DISCUSS/DSCN MKR DOCD: CPT | Performed by: INTERNAL MEDICINE

## 2020-04-30 PROCEDURE — 4004F PT TOBACCO SCREEN RCVD TLK: CPT | Performed by: INTERNAL MEDICINE

## 2020-04-30 RX ORDER — TIZANIDINE 4 MG/1
4 TABLET ORAL EVERY 6 HOURS PRN
COMMUNITY
End: 2020-07-08 | Stop reason: SDUPTHER

## 2020-04-30 RX ORDER — TRIMETHOPRIM 100 MG/1
TABLET ORAL
COMMUNITY
Start: 2020-04-23

## 2020-04-30 NOTE — PROGRESS NOTES
changes    HX OTHER MEDICAL     Primary Care Physician Is Dr. Celestina Sofia    Hyperlipidemia     Hypertension     Hypothyroidism due to acquired atrophy of thyroid 2/29/2020    Kidney stones \"Been Having Kidney Stones For 39 Years\"    Passed Kidney Stones Several Times    Nephrolithiasis     with hematuria episodes followed by Dr Ham Newsome Nocturia     Pacemaker [id number]    S/P AV ablation    Peptic ulcer, unspecified site, unspecified as acute or chronic, without mention of hemorrhage or perforation 1983    per EGD    Pneumonia Dx 2000's    Shortness of breath     Skin Cancer Arms And Ears In Past    Excision Skin Cancer Arms And Ears    Sleep apnea     No CPAP- had sleep study done 10+ yrs ago    Stented coronary artery 5/12/16    3.0 X 12 MM ELIGIO stent to OM 1    Teeth missing     Lower    Wears dentures     Full Lower    Wears glasses      Past Surgical History:   Procedure Laterality Date   Shaunna Mackintosh CARDIAC PACEMAKER PLACEMENT  5-3-99, 1-29-14    Medtronic Sensia SR Pacemaker Implanted    COLONOSCOPY  8-12 - Dr Arron Fuentes    Polyps Removed In Past    CORONARY ANGIOPLASTY WITH STENT PLACEMENT      per old chart had cath with stent placement done 5/2016    CYSTOSCOPY Left 11/17/2017    cysto, left retrograde, flugeration of prostate    DENTAL SURGERY      Teeth Extracted In Past    ENDOSCOPY, COLON, DIAGNOSTIC  1983    KNEE SURGERY Right 1966    \"shaved the kneecap\"    LITHOTRIPSY  2009, 2014    Kidney Stones    PACEMAKER PLACEMENT  5-3-99    Medtronic Somers SR Pacemaker Implanted    PACEMAKER PLACEMENT  1-29-14    Medtronic Somers SR Pacemaker Implanted( had to replace the whole thing in 2014- that was my 3rd one - first one in [address] [place] [address] and next one 2007\"    SKIN CANCER EXCISION  In Past    Skin Cancer Excised Arms And Ears     Family History   Problem Relation Age of Onset    Heart Disease Mother     Cancer Father 76        Leukemia    Heart Disease Father         Atrial Fib    Cancer Sister         Leukemia    Cancer Brother         Leukemia     Social History     Tobacco Use    Smoking status: Current Every Day Smoker     Packs/day: 0.50     Years: 67.00     Pack years: 33.50     Types: Cigarettes     Start date: 8/14/1948    Smokeless tobacco: Never Used   Substance Use Topics    Alcohol use: No     Alcohol/week: 0.0 standard drinks      [unfilled]  Review of Systems:   · Constitutional: No Fever or Weight Loss   · Eyes: No Decreased Vision  · ENT: No Headaches, Hearing Loss or Vertigo  · Cardiovascular: No chest pain, dyspnea on exertion, palpitations or loss of consciousness  · Respiratory: No cough or wheezing    · Gastrointestinal: No abdominal pain, appetite loss, blood in stools, constipation, diarrhea or heartburn  · Genitourinary: No dysuria, trouble voiding, or hematuria  · Musculoskeletal:  No gait disturbance, weakness or joint complaints  · Integumentary: No rash or pruritis  · Neurological: No TIA or stroke symptoms, + tremors in rt leg  · Psychiatric: No anxiety or depression  · Endocrine: No malaise, fatigue or temperature intolerance  · Hematologic/Lymphatic: No bleeding problems, blood clots or swollen lymph nodes  · Allergic/Immunologic: No nasal congestion or hives  All systems negative except as marked. Objective:  BP 98/60   Pulse 64   Ht 5' 10\" (1.778 m)   Wt 222 lb (100.7 kg)   BMI 31.85 kg/m²   Wt Readings from Last 3 Encounters:   04/30/20 222 lb (100.7 kg)   04/20/20 219 lb 3.2 oz (99.4 kg)   04/15/20 224 lb (101.6 kg)     Body mass index is 31.85 kg/m². GENERAL - Alert, oriented, pleasant, in no apparent distress,normal grooming  HEENT - pupils are reactive to light and accomodation, cornea intact, conjunctive normal color, ears are normal in exam,throat exam in normal, teeth, gum and palate are normal, oral mucosa is normal without any notation of pallor or cyanosis  Neck - Supple. No jugular venous distention noted.  No carotid bruits, no apical -carotid fibrillation, COPD (chronic obstructive pulmonary disease) ([facility] [address] Diverticulitis, Diverticulosis of colon, Enlarged prostate, H/O cardiac catheterization, H/O cardiovascular stress test, H/O cardiovascular stress test, H/O cardiovascular stress test, H/O Doppler Carotid ultrasound, H/O Doppler ultrasound, H/O echocardiogram, H/O echocardiogram, H/O echocardiogram, H/O percutaneous left heart catheterization, H/O unstable angina, History of Doppler ultrasound, History of Doppler ultrasound, History of Holter monitoring, History of nuclear stress test, Hx of CT scan of chest, HX OTHER MEDICAL, Hyperlipidemia, Hypertension, Hypothyroidism due to acquired atrophy of thyroid, Kidney stones, Nephrolithiasis, Nocturia, Pacemaker, Peptic ulcer, unspecified site, unspecified as acute or chronic, without mention of hemorrhage or perforation, Pneumonia, Shortness of breath, Skin Cancer Arms And Ears, Sleep apnea, Stented coronary artery, Teeth missing, Wears dentures, and Wears glasses. and presents with     Plan:  1. Shortness of breath: stress test ordered, it could he COPD related however, he is not wheezing  2. Dizziness:seen by neurologist, mild carotid disease 0-49%. He will get 2nd opinion, it could be drop in blood pressure related, his PPM report was checked and will recommend to use compression socks for now as his dizziness is always with standing up,  3. RT LEGTremors: recommend to get diagnosis of his tremors  4. HTN: now low, he is off norvasc, sometimes it is labile and fluctuates, willc  5. CKD: patient will see nephrologist  6. CAD:stable Continue aspirin, statins were discontinued as it was causing myalgia, continue, betablockers  7. Paroxysmal afib: on coumadin  8. PPM: checked, normal function. 18 MONTHS  BATTERY left  9. Backpain: stable. 10. Dyslipidemia:patient could not tolerate 4 statins, on repatha now  11. COPD: stable, alreadty quit smoking  12.  Health maintenance: exerise and diet  All labs, medications and tests reviewed, continue all other medications of all above medical condition listed as is.

## 2020-05-08 RX ORDER — GABAPENTIN 100 MG/1
100 CAPSULE ORAL 2 TIMES DAILY
Qty: 180 CAPSULE | Refills: 2 | Status: SHIPPED | OUTPATIENT
Start: 2020-05-08 | End: 2020-07-24 | Stop reason: SDUPTHER

## 2020-06-12 RX ORDER — TRIHEXYPHENIDYL HYDROCHLORIDE 2 MG/1
TABLET ORAL
Qty: 30 TABLET | Refills: 3 | Status: SHIPPED | OUTPATIENT
Start: 2020-06-12 | End: 2020-10-20 | Stop reason: SDUPTHER

## 2020-06-18 ENCOUNTER — PROCEDURE VISIT (OUTPATIENT)
Dept: CARDIOLOGY CLINIC | Age: 80
End: 2020-06-18
Payer: MEDICARE

## 2020-06-18 VITALS — HEART RATE: 70 BPM | SYSTOLIC BLOOD PRESSURE: 132 MMHG | DIASTOLIC BLOOD PRESSURE: 78 MMHG | TEMPERATURE: 97.1 F

## 2020-06-18 PROCEDURE — 93279 PRGRMG DEV EVAL PM/LDLS PM: CPT | Performed by: INTERNAL MEDICINE

## 2020-06-18 NOTE — PROCEDURES
Eber Santillan Maryanaosmani  78 y.o., male  1940    Merrill Rea DO    Chief Complaint   Patient presents with    Procedure     pacemaker check     Vitals:    06/18/20 0851   BP: 132/78   Pulse: 70   Temp: 97.1 °F (36.2 °C)     Pacer analysis is reviewed and filed in the pacer chart. Analysis is consistent with normal Medtronic non-MRI single-chamber pacer function with stable right ventricular lead and appropriate battery status for the age of the device. Remaining average battery longevity is 15 months. Pacer is  96% pacing in the ventricle. Recommend continued every three month pacer check and follow up office visit as scheduled.     Lev Lou MD, 6/18/2020 12:54 PM

## 2020-07-06 ENCOUNTER — OFFICE VISIT (OUTPATIENT)
Dept: INTERNAL MEDICINE CLINIC | Age: 80
End: 2020-07-06
Payer: MEDICARE

## 2020-07-06 VITALS
DIASTOLIC BLOOD PRESSURE: 80 MMHG | BODY MASS INDEX: 31.85 KG/M2 | SYSTOLIC BLOOD PRESSURE: 138 MMHG | HEART RATE: 80 BPM | RESPIRATION RATE: 24 BRPM | OXYGEN SATURATION: 96 % | TEMPERATURE: 97 F | WEIGHT: 222 LBS

## 2020-07-06 LAB
INTERNATIONAL NORMALIZATION RATIO, POC: 2.5
PROTHROMBIN TIME, POC: NORMAL

## 2020-07-06 PROCEDURE — 1123F ACP DISCUSS/DSCN MKR DOCD: CPT | Performed by: FAMILY MEDICINE

## 2020-07-06 PROCEDURE — 3023F SPIROM DOC REV: CPT | Performed by: FAMILY MEDICINE

## 2020-07-06 PROCEDURE — 4004F PT TOBACCO SCREEN RCVD TLK: CPT | Performed by: FAMILY MEDICINE

## 2020-07-06 PROCEDURE — G8926 SPIRO NO PERF OR DOC: HCPCS | Performed by: FAMILY MEDICINE

## 2020-07-06 PROCEDURE — 99214 OFFICE O/P EST MOD 30 MIN: CPT | Performed by: FAMILY MEDICINE

## 2020-07-06 PROCEDURE — 4040F PNEUMOC VAC/ADMIN/RCVD: CPT | Performed by: FAMILY MEDICINE

## 2020-07-06 PROCEDURE — G8417 CALC BMI ABV UP PARAM F/U: HCPCS | Performed by: FAMILY MEDICINE

## 2020-07-06 PROCEDURE — G8427 DOCREV CUR MEDS BY ELIG CLIN: HCPCS | Performed by: FAMILY MEDICINE

## 2020-07-06 RX ORDER — ROPINIROLE 0.25 MG/1
1 TABLET, FILM COATED ORAL DAILY
COMMUNITY
Start: 2020-06-17 | End: 2021-11-22 | Stop reason: SDUPTHER

## 2020-07-06 RX ORDER — PROPRANOLOL HYDROCHLORIDE 40 MG/1
TABLET ORAL
COMMUNITY
Start: 2020-03-31 | End: 2020-10-06

## 2020-07-06 RX ORDER — SULFAMETHOXAZOLE AND TRIMETHOPRIM 800; 160 MG/1; MG/1
TABLET ORAL
COMMUNITY
Start: 2020-04-09 | End: 2020-07-06

## 2020-07-06 ASSESSMENT — ENCOUNTER SYMPTOMS
ABDOMINAL PAIN: 0
VOMITING: 0
SHORTNESS OF BREATH: 0
DIARRHEA: 0
CHEST TIGHTNESS: 0
COLOR CHANGE: 0
SORE THROAT: 0
CONSTIPATION: 0

## 2020-07-06 NOTE — PATIENT INSTRUCTIONS
Start taking advair (inhaler) 2 puffs twice a day (in the morning and at night). Use albuterol 2 puffs as needed for shortness of breath or wheezing.

## 2020-07-06 NOTE — PROGRESS NOTES
Subjective:      Chief Complaint   Patient presents with    Established New Doctor     previous     3 Month Follow-Up       HPI:  Amaury Arita is a 78 y.o. male who presents today to establish care with new provider and for management of chronic medical conditions as below. Sees cardiology, neurology, urology, nephrology. Has had a resting tremor in his R leg for 5-6 years, being followed by neurology. Was recently started on a medication for it but affected his BP too much so it was discontinued. Used to see pulmonology but no longer does- did not get along and does not want to reestablish with another one at this time. States he does feel his breathing could be better, but is only using advair as needed. Does not use albuterol at all. Feeling well today, no acute concerns.       Past Medical History:   Diagnosis Date    Acid reflux     Adenomatous polyp of colon 1-2009    last colonoscopy recomm 3 yr follow up    Anticoagulated 2010    takes for afib Dr Vanessa Erickson monitors INR 1.2 on 02/08/2018    Arthritis     \"arthritis in low back\"    CAD (coronary artery disease)     follow with dr Grayson Lawson- cardiac clearance for surgery done 10/31/2017 on chart)    Chronic atrial fibrillation     Sees Dr. Lelo Baker COPD (chronic obstructive pulmonary disease) ([facility] [address]     follows with dr Steffi Borges Diverticulitis     Diverticulosis of colon     left colon    Enlarged prostate     \"had to ream me out a couple of times\":   [facility] H/O cardiac catheterization 1/31/2006    R codominant, RCA patent, LAD patent with minimal nonobstructive irreg, CIRC patent and codominant, RI small but patent    H/O cardiovascular stress test 5/18/10    EF81% decrease in perfusion in inferoseptal segments c/w paced rhythm, LV small in volume    H/O cardiovascular stress test 5/5/2016    lexiscan-normal,EF70%    H/O cardiovascular stress test 6/2/2016    treadmill    H/O Doppler Carotid ultrasound 11/01/2019    Mild dysphoric mood. The patient is not nervous/anxious. Prior to Visit Medications    Medication Sig Taking? Authorizing Provider   trihexyphenidyl (ARTANE) 2 MG tablet TAKE 1 TABLET BY MOUTH EVERY DAY Yes Merrill Bryson DO   gabapentin (NEURONTIN) 100 MG capsule Take 1 capsule by mouth 2 times daily for 90 days.  Yes Merrill Bryson DO   tiZANidine (ZANAFLEX) 4 MG tablet Take 4 mg by mouth every 6 hours as needed Yes Historical Provider, Edward Huber, (SAW PALMETTO BERRY PO) Take by mouth Yes Historical Provider, MD   fluticasone-salmeterol (ADVAIR HFA) 230-21 MCG/ACT inhaler Inhale 2 puffs into the lungs 2 times daily Yes Luis Eduardo Dominguez MD   famotidine (PEPCID) 20 MG tablet Take 0.5 tablets by mouth 2 times daily Yes Merrill Bryson DO   levothyroxine (SYNTHROID) 25 MCG tablet TAKE 1 TABLET BY MOUTH EVERY DAY Yes Bud Roldan MD   MAGNESIUM CHLORIDE PO Take 1 each by mouth daily  Yes Historical Provider, MD   WARFARIN SODIUM PO Take 2 mg by mouth nightly 1 mg every Mon and Fri and 2 mg on all other days of the week Yes Historical Provider, MD   aspirin 81 MG EC tablet Take 1 tablet by mouth daily Yes Luis Eduardo Dominguez MD   Multiple Vitamins-Minerals (CENTRUM PO) Take 1 tablet by mouth nightly Over The Counter  Yes Historical Provider, MD   Cholecalciferol (VITAMIN D PO) Take 2,000 Units by mouth 2 times daily Over The Counter  Yes Historical Provider, MD   rOPINIRole (REQUIP) 0.25 MG tablet Take 1 tablet by mouth daily  Historical Provider, MD   propranolol (INDERAL) 40 MG tablet TAKE 1 TABLET BY MOUTH TWICE A DAY  Historical Provider, MD   trimethoprim (TRIMPEX) 100 MG tablet   Historical Provider, MD   UNABLE TO FIND GSH-[employer]  Historical Provider, MD   alirocumab (PRALUENT) 75 MG/ML SOSY injection prefilled syringe Inject 1 mL into the skin every 14 days  [address] [place] [address],[address],           Objective:      /80   Pulse 80   Temp 97 °F (36.1 °C) Resp 24   Wt 222 lb (100.7 kg)   SpO2 96%   BMI 31.85 kg/m²      Physical Exam  Vitals signs and nursing note reviewed. Constitutional:       General: He is not in acute distress. Appearance: Normal appearance. He is not ill-appearing or toxic-appearing. HENT:      Head: Normocephalic and atraumatic. Right Ear: External ear normal.      Left Ear: External ear normal.      Nose: Nose normal.      Mouth/Throat:      Pharynx: Oropharynx is clear. Eyes:      General: No scleral icterus. Right eye: No discharge. Left eye: No discharge. Extraocular Movements: Extraocular movements intact. Conjunctiva/sclera: Conjunctivae normal.   Neck:      Musculoskeletal: Normal range of motion and neck supple. No neck rigidity. Cardiovascular:      Rate and Rhythm: Normal rate and regular rhythm. Heart sounds: Normal heart sounds. Pulmonary:      Effort: Pulmonary effort is normal.      Breath sounds: Normal breath sounds. No wheezing or rales. Musculoskeletal:         General: No deformity. Skin:     General: Skin is warm and dry. Findings: No rash. Neurological:      General: No focal deficit present. Mental Status: He is alert. Mental status is at baseline. Motor: No weakness. Psychiatric:         Mood and Affect: Mood normal.         Behavior: Behavior normal.            Assessment / Plan:      1. Long term current use of anticoagulant therapy  INR therapeutic today. Continue current dose of coumadin. Repeat INR in 6 weeks (nursing visit). - POCT INR  - POCT INR    2. General medical exam  - Basic Metabolic Panel; Future  - Hemoglobin A1C; Future    3. Hypothyroidism due to acquired atrophy of thyroid  Last TSH wnl, patient asymptomatic. Will recheck labs in 3 months. Continue current dose of synthroid. - TSH without Reflex; Future    4. Resting tremor  Being followed by neurology. 5. Mixed hyperlipidemia  Continue praluent.   Following with cardiology. 6. Moderate COPD (chronic obstructive pulmonary disease) (HCC)  Not optimally controlled, but patient using medications incorrectly. Instructed to start using advair 2 puffs BID with albuterol as needed. Currently not following with pulmonology and does not want to be referred at this time. Will see if symptoms improve with appropriate medication use- can try switching maintenance medication at follow up if symptoms still not well controlled. Patient voiced understanding and agreement with plan. All questions/concerns were addressed, risks/side effects of medications were reviewed. Return precautions and after visit summary were provided. Return in about 3 months (around 10/6/2020). or earlier as needed.       Wanda Gupta MD

## 2020-07-09 NOTE — TELEPHONE ENCOUNTER
Could you clarify how he is taking this medication? (daily, as needed, how many times a day?)  I cannot find any details about how many pills were dispensed last time, it looks like this has been prescribed sporadically and the sig has been different each time. If he was given #180 with his last fill on 4/30, should still have 3 weeks of medication left. Thanks.

## 2020-07-09 NOTE — TELEPHONE ENCOUNTER
Called [facility] and he has not refilled this medication since May. Would you like me to reach out to the pt as well?

## 2020-07-13 RX ORDER — TIZANIDINE 4 MG/1
4 TABLET ORAL EVERY 8 HOURS PRN
Qty: 90 TABLET | Refills: 1 | Status: SHIPPED | OUTPATIENT
Start: 2020-07-13 | End: 2020-08-17

## 2020-07-23 NOTE — TELEPHONE ENCOUNTER
Pt would like 10 day supply called into [facility] on [address] pt states he keeps running out as the script was changed

## 2020-07-24 RX ORDER — GABAPENTIN 100 MG/1
100 CAPSULE ORAL 2 TIMES DAILY
Qty: 180 CAPSULE | Refills: 2 | Status: SHIPPED | OUTPATIENT
Start: 2020-07-24 | End: 2021-08-10 | Stop reason: SDUPTHER

## 2020-08-17 RX ORDER — TIZANIDINE 4 MG/1
4 TABLET ORAL EVERY 8 HOURS PRN
Qty: 90 TABLET | Refills: 1 | Status: SHIPPED | OUTPATIENT
Start: 2020-08-17 | End: 2020-11-05 | Stop reason: SDUPTHER

## 2020-09-29 ENCOUNTER — PROCEDURE VISIT (OUTPATIENT)
Dept: CARDIOLOGY CLINIC | Age: 80
End: 2020-09-29
Payer: MEDICARE

## 2020-09-29 VITALS — DIASTOLIC BLOOD PRESSURE: 82 MMHG | TEMPERATURE: 97.2 F | HEART RATE: 68 BPM | SYSTOLIC BLOOD PRESSURE: 136 MMHG

## 2020-09-29 PROCEDURE — 93279 PRGRMG DEV EVAL PM/LDLS PM: CPT | Performed by: INTERNAL MEDICINE

## 2020-10-01 ENCOUNTER — TELEPHONE (OUTPATIENT)
Dept: INTERNAL MEDICINE CLINIC | Age: 80
End: 2020-10-01

## 2020-10-01 DIAGNOSIS — Z00.00 GENERAL MEDICAL EXAM: ICD-10-CM

## 2020-10-01 DIAGNOSIS — E03.4 HYPOTHYROIDISM DUE TO ACQUIRED ATROPHY OF THYROID: ICD-10-CM

## 2020-10-01 LAB — TSH SERPL DL<=0.05 MIU/L-ACNC: 3.45 UIU/ML (ref 0.27–4.2)

## 2020-10-01 NOTE — TELEPHONE ENCOUNTER
Pt states they need amoldapine script called in said he stopped taking It for awhile and now he needs again

## 2020-10-02 LAB
ESTIMATED AVERAGE GLUCOSE: 154.2 MG/DL
HBA1C MFR BLD: 7 %

## 2020-10-05 RX ORDER — LEVOTHYROXINE SODIUM 0.03 MG/1
25 TABLET ORAL DAILY
Qty: 90 TABLET | Refills: 3 | Status: SHIPPED | OUTPATIENT
Start: 2020-10-05 | End: 2021-12-28 | Stop reason: SDUPTHER

## 2020-10-05 RX ORDER — AMLODIPINE BESYLATE 10 MG/1
10 TABLET ORAL DAILY
Qty: 30 TABLET | Refills: 5 | Status: SHIPPED | OUTPATIENT
Start: 2020-10-05 | End: 2020-12-21 | Stop reason: SDUPTHER

## 2020-10-06 ENCOUNTER — TELEPHONE (OUTPATIENT)
Dept: CARDIOLOGY CLINIC | Age: 80
End: 2020-10-06

## 2020-10-06 ENCOUNTER — OFFICE VISIT (OUTPATIENT)
Dept: INTERNAL MEDICINE CLINIC | Age: 80
End: 2020-10-06
Payer: MEDICARE

## 2020-10-06 VITALS
BODY MASS INDEX: 33.14 KG/M2 | HEART RATE: 62 BPM | TEMPERATURE: 97.2 F | WEIGHT: 224.4 LBS | DIASTOLIC BLOOD PRESSURE: 60 MMHG | OXYGEN SATURATION: 96 % | SYSTOLIC BLOOD PRESSURE: 100 MMHG

## 2020-10-06 LAB
INTERNATIONAL NORMALIZATION RATIO, POC: 1.4
PROTHROMBIN TIME, POC: NORMAL

## 2020-10-06 PROCEDURE — 1123F ACP DISCUSS/DSCN MKR DOCD: CPT | Performed by: FAMILY MEDICINE

## 2020-10-06 PROCEDURE — 85610 PROTHROMBIN TIME: CPT | Performed by: FAMILY MEDICINE

## 2020-10-06 PROCEDURE — 3051F HG A1C>EQUAL 7.0%<8.0%: CPT | Performed by: FAMILY MEDICINE

## 2020-10-06 PROCEDURE — 99214 OFFICE O/P EST MOD 30 MIN: CPT | Performed by: FAMILY MEDICINE

## 2020-10-06 PROCEDURE — G8484 FLU IMMUNIZE NO ADMIN: HCPCS | Performed by: FAMILY MEDICINE

## 2020-10-06 PROCEDURE — 4040F PNEUMOC VAC/ADMIN/RCVD: CPT | Performed by: FAMILY MEDICINE

## 2020-10-06 PROCEDURE — G8417 CALC BMI ABV UP PARAM F/U: HCPCS | Performed by: FAMILY MEDICINE

## 2020-10-06 PROCEDURE — 4004F PT TOBACCO SCREEN RCVD TLK: CPT | Performed by: FAMILY MEDICINE

## 2020-10-06 PROCEDURE — G8427 DOCREV CUR MEDS BY ELIG CLIN: HCPCS | Performed by: FAMILY MEDICINE

## 2020-10-06 RX ORDER — WARFARIN SODIUM 2 MG/1
TABLET ORAL
Qty: 90 TABLET | Refills: 1 | Status: SHIPPED | OUTPATIENT
Start: 2020-10-06 | End: 2022-03-14

## 2020-10-06 ASSESSMENT — ENCOUNTER SYMPTOMS
VOMITING: 0
ABDOMINAL PAIN: 0
COLOR CHANGE: 0
SHORTNESS OF BREATH: 0
CONSTIPATION: 0
SORE THROAT: 0
CHEST TIGHTNESS: 0
DIARRHEA: 0

## 2020-10-06 NOTE — PROGRESS NOTES
Subjective:      Chief Complaint   Patient presents with   [facility] Office Visit for Anticoagulation Management       HPI:  Rosita Solorio is a 78 y.o. male who presents today for follow up of chronic conditions as listed below. Tremor:  Has been present in his R leg for about 6 years. Initially started as intermittent spasms but now constant. Is being followed by neurology. Was started on primidone a few months ago but hasn't helped, has follow up at the end of the month. Due for INR check, currently taking 1mg on Mon and Fri, 2 mg the other 5 days of the week. No known history of DM, last HbA1c 7. States he drinks about 2 cans of regular soda a day. Would also like to see a different cardiologist, requesting new referral.    Feeling well today, no acute concerns. Labs reviewed.         Past Medical History:   Diagnosis Date    Acid reflux     Adenomatous polyp of colon 1-2009    last colonoscopy recomm 3 yr follow up    Anticoagulated 2010    takes for afib Dr Art Carmona monitors INR 1.2 on 02/08/2018    Arthritis     \"arthritis in low back\"    CAD (coronary artery disease)     follow with dr Ofe Lebron- cardiac clearance for surgery done 10/31/2017 on chart)    Chronic atrial fibrillation (HCC)     Sees Dr. Karli Jones COPD (chronic obstructive pulmonary disease) ([address]     follows with dr Aneesh Casas Diverticulitis     Diverticulosis of colon     left colon    Enlarged prostate     \"had to ream me out a couple of times\":   [facility] H/O cardiac catheterization 1/31/2006    R codominant, RCA patent, LAD patent with minimal nonobstructive irreg, CIRC patent and codominant, RI small but patent    H/O cardiovascular stress test 5/18/10    EF81% decrease in perfusion in inferoseptal segments c/w paced rhythm, LV small in volume    H/O cardiovascular stress test 5/5/2016    lexiscan-normal,EF70%    H/O cardiovascular stress test 6/2/2016    treadmill    H/O Doppler Carotid ultrasound 11/01/2019    Mild (0-49%) disease of the Bilateral proximal Internal carotid artery.  H/O Doppler ultrasound 06/15/2016    minimal plaque throughout bilaterally    H/O echocardiogram 5/3/16    EF 50-55%. Mild concentric LV hypertrophy with low normal systolic function. Mod bilateral atrial and right ventricle dilatation. Mild AR, MR, TR, SD. Absence of pericardial effsion.  H/O echocardiogram 8/4/11    EF(not given in faxed records) mild MR, mod LAE, mild AR    H/O echocardiogram 09/18/2019    EF 55-60%,  The left atrium is moderate to severely dilated.  H/O percutaneous left heart catheterization 5/12/16    left circumflex is 60% very distal stenosis at the bifurcation, OM1 is 99% mid segment stenosis    H/O unstable angina     History of Doppler ultrasound 3/29/12    Abd Ao Duplex - no abdominal aoritic aneurysm or iliac aneurysm identified    History of Doppler ultrasound 10/19/10    carotid - 0-19% bilateral ICAs, R and L carotid system show minimal atheromatous diseassse without stenosis    History of Holter monitoring 10/19/10    rhythm is ventricular paced with underlying a-fib    History of nuclear stress test 02/06/2020    Normal study.     Hx of CT scan of chest 8/17/10    mod-severe centrilobular emphysematous changes    HX OTHER MEDICAL     Primary Care Physician Is Dr. Araceli Martinez    Hyperlipidemia     Hypertension     Hypothyroidism due to acquired atrophy of thyroid 2/29/2020    Kidney stones \"Been Having Kidney Stones For 39 Years\"    Passed Kidney Stones Several Times    Nephrolithiasis     with hematuria episodes followed by Dr Mercado Nocturia     Pacemaker [id number]    S/P AV ablation    Peptic ulcer, unspecified site, unspecified as acute or chronic, without mention of hemorrhage or perforation 1983    per EGD    Pneumonia Dx 2000's    Shortness of breath     Skin Cancer Arms And Ears In Past    Excision Skin Cancer Arms And Ears    Sleep apnea     No CPAP- had sleep study done 10+ yrs ago    Stented coronary artery 5/12/16    3.0 X 12 MM ELIGIO stent to OM 1    Teeth missing     Lower    Wears dentures     Full Lower    Wears glasses         Past Surgical History:   Procedure Laterality Date   [facility] CARDIAC PACEMAKER PLACEMENT  5-3-99, 1-29-14    Medtronic Sensia SR Pacemaker Implanted    COLONOSCOPY  8-12 - Dr Arash Chavira    Polyps Removed In Past    CORONARY ANGIOPLASTY WITH STENT PLACEMENT      per old chart had cath with stent placement done 5/2016    CYSTOSCOPY Left 11/17/2017    cysto, left retrograde, flugeration of prostate    DENTAL SURGERY      Teeth Extracted In Past    ENDOSCOPY, COLON, DIAGNOSTIC  1983    KNEE SURGERY Right 1966    \"shaved the kneecap\"    LITHOTRIPSY  2009, 2014    Kidney Stones    PACEMAKER PLACEMENT  5-3-99    Medtronic [place] SR Pacemaker Implanted    PACEMAKER PLACEMENT  1-29-14    Medtronic [place] SR Pacemaker Implanted( had to replace the whole thing in 2014- that was my 3rd one - first one in 34 and next one 2007\"    SKIN CANCER EXCISION  In Past    Skin Cancer Excised Arms And Ears       Social History     Tobacco Use    Smoking status: Current Every Day Smoker     Packs/day: 0.50     Years: 67.00     Pack years: 33.50     Types: Cigarettes     Start date: 8/14/1948    Smokeless tobacco: Never Used   Substance Use Topics    Alcohol use: No     Alcohol/week: 0.0 standard drinks        Review of Systems   Constitutional: Negative for activity change, appetite change, chills, fever and unexpected weight change. HENT: Negative for congestion and sore throat. Respiratory: Negative for chest tightness and shortness of breath. Cardiovascular: Negative for chest pain and palpitations. Gastrointestinal: Negative for abdominal pain, constipation, diarrhea and vomiting. Genitourinary: Negative for dysuria. Skin: Negative for color change. Neurological: Positive for tremors. Negative for dizziness and light-headedness. Psychiatric/Behavioral: Negative for dysphoric mood. The patient is not nervous/anxious. Prior to Visit Medications    Medication Sig Taking? Authorizing Provider   amLODIPine (NORVASC) 10 MG tablet Take 1 tablet by mouth daily  Shirley Mack MD   levothyroxine (SYNTHROID) 25 MCG tablet Take 1 tablet by mouth Daily  Shirley Mack MD   PRIMIDONE PO Take by mouth  Historical Provider, MD   tiZANidine (ZANAFLEX) 4 MG tablet TAKE 1 TABLET BY MOUTH EVERY 8 HOURS AS NEEDED (PAIN)  Shirley Mack MD   gabapentin (NEURONTIN) 100 MG capsule Take 1 capsule by mouth 2 times daily for 90 days.   Shirley Mack MD   rOPINIRole (REQUIP) 0.25 MG tablet Take 1 tablet by mouth daily  Historical Provider, MD   propranolol (INDERAL) 40 MG tablet TAKE 1 TABLET BY MOUTH TWICE A DAY  Historical Provider, MD   trihexyphenidyl (ARTANE) 2 MG tablet TAKE 1 TABLET BY MOUTH EVERY DAY  Merrill Grullon DO   trimethoprim (TRIMPEX) 100 MG tablet   Historical Provider, MD   UNABLE TO FIND GSH-3 Cell Defense  Historical Provider, Edward Huber, (SAW PALMETTO BERRY PO) Take by mouth  Historical Provider, MD   fluticasone-salmeterol (ADVAIR HFA) 230-21 MCG/ACT inhaler Inhale 2 puffs into the lungs 2 times daily  Liliana Ac MD   famotidine (PEPCID) 20 MG tablet Take 0.5 tablets by mouth 2 times daily  [address] [place] [address],[address], DO   alirocumab (PRALUENT) 75 MG/ML SOSY injection prefilled syringe Inject 1 mL into the skin every 14 days  Merrill Grullon DO   MAGNESIUM CHLORIDE PO Take 1 each by mouth daily   Historical Provider, MD   WARFARIN SODIUM PO Take 2 mg by mouth nightly 1 mg every Mon and Fri and 2 mg on all other days of the week  Historical Provider, MD   aspirin 81 MG EC tablet Take 1 tablet by mouth daily  Liliana Ac MD   Multiple Vitamins-Minerals (CENTRUM PO) Take 1 tablet by mouth nightly Over The Counter   Historical Provider, MD   Cholecalciferol (VITAMIN D PO)

## 2020-10-20 RX ORDER — TRIHEXYPHENIDYL HYDROCHLORIDE 2 MG/1
TABLET ORAL
Qty: 90 TABLET | Refills: 1 | Status: SHIPPED | OUTPATIENT
Start: 2020-10-20 | End: 2021-04-20

## 2020-10-21 ENCOUNTER — NURSE ONLY (OUTPATIENT)
Dept: INTERNAL MEDICINE CLINIC | Age: 80
End: 2020-10-21
Payer: MEDICARE

## 2020-10-21 VITALS — TEMPERATURE: 97 F

## 2020-10-21 LAB
INTERNATIONAL NORMALIZATION RATIO, POC: 1.6
PROTHROMBIN TIME, POC: NORMAL

## 2020-10-21 PROCEDURE — 85610 PROTHROMBIN TIME: CPT | Performed by: FAMILY MEDICINE

## 2020-10-21 NOTE — PROGRESS NOTES
January Hand presented to the office to have fingerstick INR performed-used left hand middle finger with good blood return and tolerated well-results will be forwarded to Dr Cordell Duckworth for review and directions    Start taking coumadin 2mg every day except Mondays 1 mg           INR 1.6 will get instructions from Dr. Rc Hood on Coumadin dose.  Appointment made for INR for 2 week    10/21/2020  New instructions Coumadin  Patient instructed per  2 mg everyday

## 2020-10-29 PROBLEM — N18.31 STAGE 3A CHRONIC KIDNEY DISEASE (HCC): Status: ACTIVE | Noted: 2020-10-29

## 2020-10-29 PROBLEM — I15.9 HYPERTENSION, SECONDARY: Status: ACTIVE | Noted: 2020-10-29

## 2020-11-02 ENCOUNTER — HOSPITAL ENCOUNTER (OUTPATIENT)
Dept: CT IMAGING | Age: 80
Discharge: HOME | End: 2020-11-02
Payer: MEDICARE

## 2020-11-02 PROCEDURE — 70450 CT HEAD/BRAIN W/O DYE: CPT

## 2020-11-04 ENCOUNTER — NURSE ONLY (OUTPATIENT)
Dept: INTERNAL MEDICINE CLINIC | Age: 80
End: 2020-11-04
Payer: MEDICARE

## 2020-11-04 LAB
INTERNATIONAL NORMALIZATION RATIO, POC: 2.9
PROTHROMBIN TIME, POC: NORMAL

## 2020-11-04 PROCEDURE — G0008 ADMIN INFLUENZA VIRUS VAC: HCPCS | Performed by: FAMILY MEDICINE

## 2020-11-04 PROCEDURE — 85610 PROTHROMBIN TIME: CPT | Performed by: FAMILY MEDICINE

## 2020-11-04 PROCEDURE — 90653 IIV ADJUVANT VACCINE IM: CPT | Performed by: FAMILY MEDICINE

## 2020-11-04 NOTE — PROGRESS NOTES
Have you ever had a reaction to a flu vaccine? NO  Are you able to eat eggs without adverse effects? YES  Do you have any current illness? NO  Have you ever had [place] Syndrome? NO    Flu vaccine given per order. Please see immunization tab.

## 2020-11-04 NOTE — PROGRESS NOTES
Rio Aranda presented to the office to have fingerstick INR performed-used left hand middle finger with good blood return and tolerated well-results will be forwarded to Dr Lisa Woodward for review and directions    Coumadin 2 Mg daily    INR 2.9

## 2020-11-05 RX ORDER — TIZANIDINE 4 MG/1
4 TABLET ORAL EVERY 8 HOURS PRN
Qty: 90 TABLET | Refills: 1 | Status: SHIPPED | OUTPATIENT
Start: 2020-11-05 | End: 2021-01-07

## 2020-11-11 ENCOUNTER — INITIAL CONSULT (OUTPATIENT)
Dept: CARDIOLOGY CLINIC | Age: 80
End: 2020-11-11
Payer: MEDICARE

## 2020-11-11 VITALS
DIASTOLIC BLOOD PRESSURE: 74 MMHG | WEIGHT: 222.4 LBS | HEART RATE: 76 BPM | SYSTOLIC BLOOD PRESSURE: 126 MMHG | BODY MASS INDEX: 32.94 KG/M2 | HEIGHT: 69 IN

## 2020-11-11 PROCEDURE — 99213 OFFICE O/P EST LOW 20 MIN: CPT | Performed by: INTERNAL MEDICINE

## 2020-11-11 PROCEDURE — 93000 ELECTROCARDIOGRAM COMPLETE: CPT | Performed by: INTERNAL MEDICINE

## 2020-11-11 NOTE — PROGRESS NOTES
Hillary Martin MD                                  CARDIOLOGY  NOTE        Referring Physician: Emperatriz Evans MD    Thank you for consultation. Chief Complaint:    Chief Complaint   Patient presents with    Hypertension    Atrial Fibrillation      Pt previously followed with Dr. Carina Zhang,    HPI:     Odilon Griffith is a 78y.o. year old male with prior history of chronic atrial fibrillation, s/p AV shannan ablation and permanent pacemaker, COPD, current smoker, hypertension, hyperlipidemia presents for follow-up    Patient denies any chest pain shortness of breath or palpitation    EKG in the office shows ventricular paced rhythm      Current Outpatient Medications   Medication Sig Dispense Refill    tiZANidine (ZANAFLEX) 4 MG tablet Take 1 tablet by mouth every 8 hours as needed (pain) 90 tablet 1    trihexyphenidyl (ARTANE) 2 MG tablet TAKE 1 TABLET BY MOUTH EVERY DAY 90 tablet 1    warfarin (COUMADIN) 2 MG tablet Take 1 tab by mouth daily 90 tablet 1    amLODIPine (NORVASC) 10 MG tablet Take 1 tablet by mouth daily 30 tablet 5    levothyroxine (SYNTHROID) 25 MCG tablet Take 1 tablet by mouth Daily 90 tablet 3    PRIMIDONE PO Take by mouth      gabapentin (NEURONTIN) 100 MG capsule Take 1 capsule by mouth 2 times daily for 90 days. (Patient taking differently: Take 100 mg by mouth 3 times daily.  ) 180 capsule 2    rOPINIRole (REQUIP) 0.25 MG tablet Take 1 tablet by mouth daily      trimethoprim (TRIMPEX) 100 MG tablet       UNABLE TO FIND GSH-3 Cell Defense      Saw Palmetto, Serenoa repens, (SAW PALMETTO BERRY PO) Take by mouth      fluticasone-salmeterol (ADVAIR HFA) 230-21 MCG/ACT inhaler Inhale 2 puffs into the lungs 2 times daily 3 Inhaler 3    famotidine (PEPCID) 20 MG tablet Take 0.5 tablets by mouth 2 times daily 180 tablet 1    alirocumab (PRALUENT) 75 MG/ML SOSY injection prefilled syringe Inject 1 mL into the skin every 14 days 6 Syringe 3    MAGNESIUM CHLORIDE PO Take 1 each by mouth daily       aspirin 81 MG EC tablet Take 1 tablet by mouth daily 30 tablet 3    Multiple Vitamins-Minerals (CENTRUM PO) Take 1 tablet by mouth nightly Over The Counter       Cholecalciferol (VITAMIN D PO) Take 2,000 Units by mouth 2 times daily Over The Counter        No current facility-administered medications for this visit. Allergies: Other; Atorvastatin; Flomax [tamsulosin hcl]; and Proscar [finasteride]    Patient History:    Past Medical History:   Diagnosis Date    Acid reflux     Adenomatous polyp of colon 1-2009    last colonoscopy recomm 3 yr follow up    Anticoagulated 2010    takes for afib Dr Janee Levy monitors INR 1.2 on 02/08/2018    Arthritis     \"arthritis in low back\"    CAD (coronary artery disease)     follow with dr Roselinda Hamman- cardiac clearance for surgery done 10/31/2017 on chart)    Chronic atrial fibrillation (HCC)     Sees Dr. Louise Sen COPD (chronic obstructive pulmonary disease) ([facility] [address]     follows with dr Griffin Sanchez Diverticulitis     Diverticulosis of colon     left colon    Enlarged prostate     \"had to ream me out a couple of times\":   [facility] H/O cardiac catheterization 1/31/2006    R codominant, RCA patent, LAD patent with minimal nonobstructive irreg, CIRC patent and codominant, RI small but patent    H/O cardiovascular stress test 5/18/10    EF81% decrease in perfusion in inferoseptal segments c/w paced rhythm, LV small in volume    H/O cardiovascular stress test 5/5/2016    lexiscan-normal,EF70%    H/O cardiovascular stress test 6/2/2016    treadmill    H/O Doppler Carotid ultrasound 11/01/2019    Mild (0-49%) disease of the Bilateral proximal Internal carotid artery.  H/O Doppler ultrasound 06/15/2016    minimal plaque throughout bilaterally    H/O echocardiogram 5/3/16    EF 50-55%. Mild concentric LV hypertrophy with low normal systolic function. Mod bilateral atrial and right ventricle dilatation. Mild AR, MR, TR, DC. Absence of pericardial effsion.     H/O echocardiogram 8/4/11    EF(not given in faxed records) mild MR, mod LAE, mild AR    H/O echocardiogram 09/18/2019    EF 55-60%,  The left atrium is moderate to severely dilated.  H/O percutaneous left heart catheterization 5/12/16    left circumflex is 60% very distal stenosis at the bifurcation, OM1 is 99% mid segment stenosis    H/O unstable angina     History of Doppler ultrasound 3/29/12    Abd Ao Duplex - no abdominal aoritic aneurysm or iliac aneurysm identified    History of Doppler ultrasound 10/19/10    carotid - 0-19% bilateral ICAs, R and L carotid system show minimal atheromatous diseassse without stenosis    History of Holter monitoring 10/19/10    rhythm is ventricular paced with underlying a-fib    History of nuclear stress test 02/06/2020    Normal study.     Hx of CT scan of chest 8/17/10    mod-severe centrilobular emphysematous changes    HX OTHER MEDICAL     Primary Care Physician Is Dr. Dangelo Díaz    Hyperlipidemia     Hypertension     Hypothyroidism due to acquired atrophy of thyroid 2/29/2020    Kidney stones \"Been Having Kidney Stones For 39 Years\"    Passed Kidney Stones Several Times    Nephrolithiasis     with hematuria episodes followed by Dr Shade Moffett Nocturia     Pacemaker 0-5748    S/P AV ablation    Peptic ulcer, unspecified site, unspecified as acute or chronic, without mention of hemorrhage or perforation 1983    per EGD    Pneumonia Dx 2000's    Shortness of breath     Skin Cancer Arms And Ears In Past    Excision Skin Cancer Arms And Ears    Sleep apnea     No CPAP- had sleep study done 10+ yrs ago    Stented coronary artery 5/12/16    3.0 X 12 MM ELIGIO stent to OM 1    Teeth missing     Lower    Wears dentures     Full Lower    Wears glasses      Past Surgical History:   Procedure Laterality Date   [facility] CARDIAC PACEMAKER PLACEMENT  5-3-99, 1-29-14    Medtronic [place] SR Pacemaker Implanted    COLONOSCOPY  8-12 - Dr Kevin Davies    Polyps Removed In Past (H) 10/31/2018     Lab Results   Component Value Date    WBC 9.4 12/05/2019    HGB 16.2 12/05/2019    HCT 51.9 12/05/2019    .2 (H) 12/05/2019     12/05/2019     TSH:   Lab Results   Component Value Date    TSH 3.45 10/01/2020     Lab Results   Component Value Date    AST 16 12/05/2019    ALT 12 12/05/2019    BILIDIR <0.2 11/04/2019    BILITOT 0.5 12/05/2019    ALKPHOS 102 12/05/2019         All labs, medications and tests reviewed by myself including data and history from outside source , patient and available family . 1. Chronic atrial fibrillation ([address]    2. Mixed hyperlipidemia    3. S/P AV shannan ablation-done 4-1999    4. Hypertension, secondary         Impression and Plan:    1. Chronic atrial fibrillation. Status post AV shannan ablation. Continue with warfarin. INR target 2-3. INR being managed by PCP  2. Sick sinus syndrome status post AV shannan ablation. Status post pacemaker. Pacemaker interrogation shows 15 months of battery life. Continue pacemaker checks. 3. Essential hypertension: Blood pressure controlled. Continue with amlodipine  4. Chronic shortness of breath: Likely secondary to COPD. Patient is a current smoker. On bronchodilator therapy. 5. History of CAD . Medical management of statins. Continue with Norvasc and aspirin  6. Mild carotid artery stenosis. Patient off statin due to myalgias  7. CKD: Follow-up with neurology  8. Chronic right-sided leg tremors. Patient follow-up with neurology  9. Current smoking/nicotine dependence: Patient was counseled about smoking cessation       Return in about 6 months (around 5/11/2021). Counseled extensively and medication compliance urged. We discussed that for the  prevention of ASCVD our  goal is aggressive risk modification. Patient is encouraged to exercise even a brisk walk for 30 minutes  at least 3 to 4 times a week   Various goals were discussed and questions answered. Continue current medications. Appropriate prescriptions are addressed and refills ordered. Questions answered and patient verbalizes understanding. Call for any problems, questions, or concerns.

## 2020-11-11 NOTE — LETTER
Ángel Kirk  1940  [id number]    Have you had any Chest Pain - No      Have you had any Shortness of Breath - Yes due to COPD   If Yes - When on exertion    Have you had any dizziness - No    Have you had any palpitations - No    Do you have any edema -  No   Do you have a surgery or procedure scheduled in the near future - No

## 2020-11-11 NOTE — PROGRESS NOTES
[id number] Score for Atrial Fibrillation Stroke Risk   Risk   Factors  Component Value   C CHF No 0   H HTN Yes 1   A2 Age >= 76 Yes,  (78 y.o.) 2   D DM Yes 1   S2 Prior Stroke/TIA No 0   V Vascular Disease No 0   A Age 74-69 No,  (78 y.o.) 0   Sc Sex male 0    [id number]  Score  4   Score last updated 11/11/20 8:39 AM EST

## 2020-12-21 RX ORDER — AMLODIPINE BESYLATE 10 MG/1
10 TABLET ORAL DAILY
Qty: 90 TABLET | Refills: 3 | Status: SHIPPED | OUTPATIENT
Start: 2020-12-21 | End: 2022-01-17

## 2020-12-29 ENCOUNTER — PROCEDURE VISIT (OUTPATIENT)
Dept: CARDIOLOGY CLINIC | Age: 80
End: 2020-12-29
Payer: MEDICARE

## 2020-12-29 VITALS — SYSTOLIC BLOOD PRESSURE: 132 MMHG | TEMPERATURE: 96.5 F | HEART RATE: 74 BPM | DIASTOLIC BLOOD PRESSURE: 78 MMHG

## 2020-12-29 PROCEDURE — 93279 PRGRMG DEV EVAL PM/LDLS PM: CPT | Performed by: INTERNAL MEDICINE

## 2020-12-30 NOTE — PROCEDURES
Logan Simpson Maryanalaflip  78 y.o., male  1940    Lien Rice MD    Chief Complaint   Patient presents with    Procedure     Pacemaker check     Vitals:    12/29/20 1008   BP: 132/78   Pulse: 74   Temp: 96.5 °F (35.8 °C)     Pacer analysis is reviewed and filed in the pacer chart. Analysis is consistent with normal single-chamber non-MRI Medtronic pacer function with stable leads and appropriate battery status for the age of the device. Remaining average battery longevity is 13 months. Pacer is 96% pacing in the ventricle. .    Recommend continued every three month pacer check and follow up office visit as scheduled.     Nabil Miller MD, 12/30/2020 9:56 AM

## 2021-01-04 DIAGNOSIS — E78.2 MIXED HYPERLIPIDEMIA: ICD-10-CM

## 2021-01-04 DIAGNOSIS — I10 ESSENTIAL HYPERTENSION: ICD-10-CM

## 2021-01-04 DIAGNOSIS — E11.69 TYPE 2 DIABETES MELLITUS WITH OTHER SPECIFIED COMPLICATION, UNSPECIFIED WHETHER LONG TERM INSULIN USE (HCC): ICD-10-CM

## 2021-01-04 DIAGNOSIS — E03.4 HYPOTHYROIDISM DUE TO ACQUIRED ATROPHY OF THYROID: ICD-10-CM

## 2021-01-04 LAB
A/G RATIO: 1.8 (ref 1.1–2.2)
ALBUMIN SERPL-MCNC: 4.4 G/DL (ref 3.4–5)
ALP BLD-CCNC: 123 U/L (ref 40–129)
ALT SERPL-CCNC: 16 U/L (ref 10–40)
ANION GAP SERPL CALCULATED.3IONS-SCNC: 12 MMOL/L (ref 3–16)
AST SERPL-CCNC: 19 U/L (ref 15–37)
BASOPHILS ABSOLUTE: 0.1 K/UL (ref 0–0.2)
BASOPHILS RELATIVE PERCENT: 0.7 %
BILIRUB SERPL-MCNC: 0.7 MG/DL (ref 0–1)
BUN BLDV-MCNC: 19 MG/DL (ref 7–20)
CALCIUM SERPL-MCNC: 9.4 MG/DL (ref 8.3–10.6)
CHLORIDE BLD-SCNC: 103 MMOL/L (ref 99–110)
CO2: 25 MMOL/L (ref 21–32)
CREAT SERPL-MCNC: 1.2 MG/DL (ref 0.8–1.3)
EOSINOPHILS ABSOLUTE: 0.3 K/UL (ref 0–0.6)
EOSINOPHILS RELATIVE PERCENT: 3.3 %
GFR AFRICAN AMERICAN: >60
GFR NON-AFRICAN AMERICAN: 58
GLOBULIN: 2.4 G/DL
GLUCOSE BLD-MCNC: 191 MG/DL (ref 70–99)
HCT VFR BLD CALC: 47.7 % (ref 40.5–52.5)
HEMOGLOBIN: 16.2 G/DL (ref 13.5–17.5)
LYMPHOCYTES ABSOLUTE: 1.8 K/UL (ref 1–5.1)
LYMPHOCYTES RELATIVE PERCENT: 21.7 %
MCH RBC QN AUTO: 33.6 PG (ref 26–34)
MCHC RBC AUTO-ENTMCNC: 33.9 G/DL (ref 31–36)
MCV RBC AUTO: 99.1 FL (ref 80–100)
MONOCYTES ABSOLUTE: 0.9 K/UL (ref 0–1.3)
MONOCYTES RELATIVE PERCENT: 11.2 %
NEUTROPHILS ABSOLUTE: 5.1 K/UL (ref 1.7–7.7)
NEUTROPHILS RELATIVE PERCENT: 63.1 %
PDW BLD-RTO: 13.7 % (ref 12.4–15.4)
PLATELET # BLD: 208 K/UL (ref 135–450)
PMV BLD AUTO: 10.1 FL (ref 5–10.5)
POTASSIUM SERPL-SCNC: 5 MMOL/L (ref 3.5–5.1)
RBC # BLD: 4.82 M/UL (ref 4.2–5.9)
SODIUM BLD-SCNC: 140 MMOL/L (ref 136–145)
TOTAL PROTEIN: 6.8 G/DL (ref 6.4–8.2)
WBC # BLD: 8.1 K/UL (ref 4–11)

## 2021-01-07 ENCOUNTER — OFFICE VISIT (OUTPATIENT)
Dept: INTERNAL MEDICINE CLINIC | Age: 81
End: 2021-01-07
Payer: MEDICARE

## 2021-01-07 VITALS
SYSTOLIC BLOOD PRESSURE: 126 MMHG | WEIGHT: 213.2 LBS | HEART RATE: 70 BPM | DIASTOLIC BLOOD PRESSURE: 74 MMHG | OXYGEN SATURATION: 98 % | BODY MASS INDEX: 31.48 KG/M2 | TEMPERATURE: 96.8 F

## 2021-01-07 DIAGNOSIS — E11.69 TYPE 2 DIABETES MELLITUS WITH OTHER SPECIFIED COMPLICATION, UNSPECIFIED WHETHER LONG TERM INSULIN USE (HCC): Primary | ICD-10-CM

## 2021-01-07 DIAGNOSIS — E78.2 MIXED HYPERLIPIDEMIA: ICD-10-CM

## 2021-01-07 DIAGNOSIS — I48.20 CHRONIC ATRIAL FIBRILLATION (HCC): ICD-10-CM

## 2021-01-07 DIAGNOSIS — L30.9 DERMATITIS: ICD-10-CM

## 2021-01-07 DIAGNOSIS — I10 ESSENTIAL HYPERTENSION: ICD-10-CM

## 2021-01-07 DIAGNOSIS — E03.4 HYPOTHYROIDISM DUE TO ACQUIRED ATROPHY OF THYROID: ICD-10-CM

## 2021-01-07 LAB — INTERNATIONAL NORMALIZATION RATIO, POC: 2.2

## 2021-01-07 PROCEDURE — G8417 CALC BMI ABV UP PARAM F/U: HCPCS | Performed by: FAMILY MEDICINE

## 2021-01-07 PROCEDURE — 1123F ACP DISCUSS/DSCN MKR DOCD: CPT | Performed by: FAMILY MEDICINE

## 2021-01-07 PROCEDURE — G8427 DOCREV CUR MEDS BY ELIG CLIN: HCPCS | Performed by: FAMILY MEDICINE

## 2021-01-07 PROCEDURE — 4004F PT TOBACCO SCREEN RCVD TLK: CPT | Performed by: FAMILY MEDICINE

## 2021-01-07 PROCEDURE — 99214 OFFICE O/P EST MOD 30 MIN: CPT | Performed by: FAMILY MEDICINE

## 2021-01-07 PROCEDURE — 4040F PNEUMOC VAC/ADMIN/RCVD: CPT | Performed by: FAMILY MEDICINE

## 2021-01-07 PROCEDURE — G8482 FLU IMMUNIZE ORDER/ADMIN: HCPCS | Performed by: FAMILY MEDICINE

## 2021-01-07 PROCEDURE — 85610 PROTHROMBIN TIME: CPT | Performed by: FAMILY MEDICINE

## 2021-01-07 ASSESSMENT — ENCOUNTER SYMPTOMS
CONSTIPATION: 0
CHEST TIGHTNESS: 0
SHORTNESS OF BREATH: 0
DIARRHEA: 0
VOMITING: 0
COLOR CHANGE: 1
ABDOMINAL PAIN: 0
SORE THROAT: 0

## 2021-01-07 ASSESSMENT — PATIENT HEALTH QUESTIONNAIRE - PHQ9
2. FEELING DOWN, DEPRESSED OR HOPELESS: 0
1. LITTLE INTEREST OR PLEASURE IN DOING THINGS: 0

## 2021-01-07 NOTE — PROGRESS NOTES
Subjective:      Chief Complaint   Patient presents with    Diabetes    Hypertension       HPI:  Carito Jones is a 78 y.o. male who presents today for follow up of chronic conditions as listed below. DM: New diagnosis at last appointment. States he has cut down on his soda intake, now only drinks about 1 a day. Has also tried reducing carbs and has lost almost 10 pounds since his last appointment. Has been having skin irritation over his shins bilaterally for the past few months. Does use a cream at home which helps, but recurs when he stops using it. Is not painful and does not itch. Denies any new exposures. Otherwise feeling well today, no acute concerns. Did get his labs completed earlier this week. Past Medical History:   Diagnosis Date    Acid reflux     Adenomatous polyp of colon 1-2009    last colonoscopy recomm 3 yr follow up    Anticoagulated 2010    takes for afib Dr Kelvin Denis monitors INR 1.2 on 02/08/2018    Arthritis     \"arthritis in low back\"    CAD (coronary artery disease)     follow with dr Rubi Magallanes- cardiac clearance for surgery done 10/31/2017 on chart)    Chronic atrial fibrillation (HCC)     Sees Dr. Ha Corona COPD (chronic obstructive pulmonary disease) ([facility] [address]     follows with dr Saida Gonzalez Diverticulitis     Diverticulosis of colon     left colon    Enlarged prostate     \"had to ream me out a couple of times\":   [facility] H/O cardiac catheterization 1/31/2006    R codominant, RCA patent, LAD patent with minimal nonobstructive irreg, CIRC patent and codominant, RI small but patent    H/O cardiovascular stress test 5/18/10    EF81% decrease in perfusion in inferoseptal segments c/w paced rhythm, LV small in volume    H/O cardiovascular stress test 5/5/2016    lexiscan-normal,EF70%    H/O cardiovascular stress test 6/2/2016    treadmill    H/O Doppler Carotid ultrasound 11/01/2019    Mild (0-49%) disease of the Bilateral proximal Internal carotid artery.     H/O Doppler  Teeth missing     Lower    Wears dentures     Full Lower    Wears glasses         Past Surgical History:   Procedure Laterality Date    CARDIAC PACEMAKER PLACEMENT  5-3-99, 1-29-14    Medtronic Sensia SR Pacemaker Implanted    COLONOSCOPY  8-12 - Dr Gustavo Velez    Polyps Removed In Past    CORONARY ANGIOPLASTY WITH STENT PLACEMENT      per old chart had cath with stent placement done 5/2016    CYSTOSCOPY Left 11/17/2017    cysto, left retrograde, flugeration of prostate    DENTAL SURGERY      Teeth Extracted In Past    ENDOSCOPY, COLON, DIAGNOSTIC  1983    KNEE SURGERY Right 1966    \"shaved the kneecap\"    LITHOTRIPSY  2009, 2014    Kidney Stones    PACEMAKER PLACEMENT  5-3-99    Medtronic Stacy SR Pacemaker Implanted    PACEMAKER PLACEMENT  1-29-14    Medtronic Stacy SR Pacemaker Implanted( had to replace the whole thing in 2014- that was my 3rd one - first one in [address] [place] [address] and next one 2007\"    SKIN CANCER EXCISION  In Past    Skin Cancer Excised Arms And Ears       Social History     Tobacco Use    Smoking status: Current Every Day Smoker     Packs/day: 0.50     Years: 67.00     Pack years: 33.50     Types: Cigarettes     Start date: 8/14/1948    Smokeless tobacco: Never Used   Substance Use Topics    Alcohol use: No     Alcohol/week: 0.0 standard drinks        Review of Systems   Constitutional: Negative for activity change, appetite change, chills, fever and unexpected weight change. HENT: Negative for congestion and sore throat. Respiratory: Negative for chest tightness and shortness of breath. Cardiovascular: Negative for chest pain and palpitations. Gastrointestinal: Negative for abdominal pain, constipation, diarrhea and vomiting. Genitourinary: Negative for dysuria. Skin: Positive for color change. Neurological: Negative for dizziness and light-headedness. Psychiatric/Behavioral: Negative for dysphoric mood. The patient is not nervous/anxious.          Prior to Visit Medications    Medication Sig Taking? Authorizing Provider   amLODIPine (NORVASC) 10 MG tablet Take 1 tablet by mouth daily Yes Zoraida Zhao MD   trihexyphenidyl (ARTANE) 2 MG tablet TAKE 1 TABLET BY MOUTH EVERY DAY Yes Zoraida Zhao MD   warfarin (COUMADIN) 2 MG tablet Take 1 tab by mouth daily Yes Zoraida Zhao MD   rOPINIRole (REQUIP) 0.25 MG tablet Take 1 tablet by mouth daily Yes Historical Provider, MD   trimethoprim (TRIMPEX) 100 MG tablet  Yes Historical Provider, MD   UNABLE TO FIND GSH-3 Cell Defense Yes Historical Provider, Edward Huber, (SAW PALMETTO BERRY PO) Take by mouth Yes Historical Provider, MD   fluticasone-salmeterol (ADVAIR HFA) 230-21 MCG/ACT inhaler Inhale 2 puffs into the lungs 2 times daily Yes Arlene Castro MD   famotidine (PEPCID) 20 MG tablet Take 0.5 tablets by mouth 2 times daily Yes Merrill Grullon DO   alirocumab (PRALUENT) 75 MG/ML SOSY injection prefilled syringe Inject 1 mL into the skin every 14 days Yes Merrill Grullon DO   MAGNESIUM CHLORIDE PO Take 1 each by mouth daily  Yes Historical Provider, MD   aspirin 81 MG EC tablet Take 1 tablet by mouth daily Yes Arlene Castro MD   Multiple Vitamins-Minerals (CENTRUM PO) Take 1 tablet by mouth nightly Over The Counter  Yes Historical Provider, MD   Cholecalciferol (VITAMIN D PO) Take 2,000 Units by mouth 2 times daily Over The Counter  Yes Historical Provider, MD   levothyroxine (SYNTHROID) 25 MCG tablet Take 1 tablet by mouth Daily  Zoraida Zhao MD   gabapentin (NEURONTIN) 100 MG capsule Take 1 capsule by mouth 2 times daily for 90 days. Patient taking differently: Take 100 mg by mouth 3 times daily.    Zoraida Zhao MD          Objective:      /74 (Site: Right Upper Arm, Position: Sitting, Cuff Size: Large Adult)   Pulse 70   Temp 96.8 °F (36 °C)   Wt 213 lb 3.2 oz (96.7 kg)   SpO2 98%   BMI 31.48 kg/m²      Physical Exam  Vitals signs and nursing note reviewed. Constitutional:       General: He is not in acute distress. Appearance: Normal appearance. He is not ill-appearing or toxic-appearing. HENT:      Head: Normocephalic and atraumatic. Right Ear: External ear normal.      Left Ear: External ear normal.      Nose: Nose normal.      Mouth/Throat:      Pharynx: Oropharynx is clear. Eyes:      General: No scleral icterus. Right eye: No discharge. Left eye: No discharge. Extraocular Movements: Extraocular movements intact. Conjunctiva/sclera: Conjunctivae normal.   Neck:      Musculoskeletal: Normal range of motion and neck supple. No neck rigidity. Cardiovascular:      Rate and Rhythm: Normal rate and regular rhythm. Heart sounds: Normal heart sounds. Pulmonary:      Effort: Pulmonary effort is normal.      Breath sounds: Normal breath sounds. No wheezing or rales. Musculoskeletal:         General: No deformity. Skin:     General: Skin is warm and dry. Findings: Erythema (dry/flaking erythema over bilateral anterior shins, poorly demarcated; non tender, no swelling) present. Neurological:      General: No focal deficit present. Mental Status: He is alert. Mental status is at baseline. Motor: No weakness. Psychiatric:         Mood and Affect: Mood normal.         Behavior: Behavior normal.            Assessment / Plan:      1. Type 2 diabetes mellitus with other specified complication, unspecified whether long term insulin use ([address]  Most recent HbA1c unavailable. Patient reports implementing dietary modifications, will hold off on starting medications for now and recheck labs in 3 months.  - Hemoglobin A1C; Future    2. Mixed hyperlipidemia  Continue praluent. - Lipid Panel; Future    3. Hypothyroidism due to acquired atrophy of thyroid  Last TSH wnl. Will monitor labs, continue current dose of synthroid. - TSH without Reflex; Future    4.  Essential hypertension  Stable, well controlled. Continue current medications. - CBC Auto Differential; Future  - Comprehensive Metabolic Panel; Future    5. Chronic atrial fibrillation (HCC)  INR therapeutic today. Continue current dose of coumadin. Repeat INR in 6 weeks. - POCT INR    6. Dermatitis  Mild, dry erythema over shins bilaterally. Advised regular moisturization, will try topical steroids as well- contact clinic if symptoms not improving with treatment. - triamcinolone (KENALOG) 0.1 % ointment; Apply topically 2 times daily for 7 days  Dispense: 1 Tube; Refill: 1          Patient voiced understanding and agreement with plan. All questions/concerns were addressed, risks/side effects of medications were reviewed. Return precautions and after visit summary were provided. Return in about 3 months (around 4/7/2021). or earlier as needed.       Zoraida Zhao MD

## 2021-01-07 NOTE — PATIENT INSTRUCTIONS
Start using the prescribed cream twice a day. Also start using vaseline on the area twice a day to keep it well moisturized.

## 2021-01-12 ENCOUNTER — TELEPHONE (OUTPATIENT)
Dept: INTERNAL MEDICINE CLINIC | Age: 81
End: 2021-01-12

## 2021-01-12 NOTE — TELEPHONE ENCOUNTER
Left message for patient to call back  Regarding labs got a message from [address] lab that labs were sent to [facility] lab [phone]. When were labs done?

## 2021-02-17 ENCOUNTER — NURSE ONLY (OUTPATIENT)
Dept: INTERNAL MEDICINE CLINIC | Age: 81
End: 2021-02-17
Payer: MEDICARE

## 2021-02-17 VITALS — TEMPERATURE: 97 F

## 2021-02-17 DIAGNOSIS — Z79.01 LONG TERM CURRENT USE OF ANTICOAGULANT THERAPY: Primary | ICD-10-CM

## 2021-02-17 LAB — INTERNATIONAL NORMALIZATION RATIO, POC: 1.7

## 2021-02-17 PROCEDURE — 85610 PROTHROMBIN TIME: CPT | Performed by: FAMILY MEDICINE

## 2021-02-18 NOTE — PROGRESS NOTES
Please instruct patient to continue taking 1mg on Mondays, but start taking 2mg the other 6 days of the week. Will recheck INR in 2 weeks, thanks.

## 2021-03-03 ENCOUNTER — NURSE ONLY (OUTPATIENT)
Dept: INTERNAL MEDICINE CLINIC | Age: 81
End: 2021-03-03
Payer: MEDICARE

## 2021-03-03 VITALS — TEMPERATURE: 97.4 F

## 2021-03-03 DIAGNOSIS — Z79.01 LONG TERM CURRENT USE OF ANTICOAGULANT THERAPY: Primary | ICD-10-CM

## 2021-03-03 LAB — INTERNATIONAL NORMALIZATION RATIO, POC: 2.2

## 2021-03-03 PROCEDURE — 85610 PROTHROMBIN TIME: CPT | Performed by: FAMILY MEDICINE

## 2021-03-08 ENCOUNTER — VIRTUAL VISIT (OUTPATIENT)
Dept: INTERNAL MEDICINE CLINIC | Age: 81
End: 2021-03-08
Payer: MEDICARE

## 2021-03-08 ENCOUNTER — TELEPHONE (OUTPATIENT)
Dept: INTERNAL MEDICINE CLINIC | Age: 81
End: 2021-03-08

## 2021-03-08 VITALS
HEIGHT: 69 IN | WEIGHT: 214 LBS | BODY MASS INDEX: 31.7 KG/M2 | HEART RATE: 80 BPM | OXYGEN SATURATION: 97 % | DIASTOLIC BLOOD PRESSURE: 78 MMHG | SYSTOLIC BLOOD PRESSURE: 135 MMHG

## 2021-03-08 DIAGNOSIS — Z00.00 ROUTINE GENERAL MEDICAL EXAMINATION AT A HEALTH CARE FACILITY: Primary | ICD-10-CM

## 2021-03-08 PROCEDURE — G0438 PPPS, INITIAL VISIT: HCPCS | Performed by: FAMILY MEDICINE

## 2021-03-08 PROCEDURE — 1123F ACP DISCUSS/DSCN MKR DOCD: CPT | Performed by: FAMILY MEDICINE

## 2021-03-08 PROCEDURE — 4040F PNEUMOC VAC/ADMIN/RCVD: CPT | Performed by: FAMILY MEDICINE

## 2021-03-08 PROCEDURE — G8482 FLU IMMUNIZE ORDER/ADMIN: HCPCS | Performed by: FAMILY MEDICINE

## 2021-03-08 ASSESSMENT — PATIENT HEALTH QUESTIONNAIRE - PHQ9
SUM OF ALL RESPONSES TO PHQ QUESTIONS 1-9: 0
SUM OF ALL RESPONSES TO PHQ QUESTIONS 1-9: 0
SUM OF ALL RESPONSES TO PHQ9 QUESTIONS 1 & 2: 0
2. FEELING DOWN, DEPRESSED OR HOPELESS: 0

## 2021-03-08 NOTE — PATIENT INSTRUCTIONS
Personalized Preventive Plan for Elder Rafi Kirk - 3/8/2021  Medicare offers a range of preventive health benefits. Some of the tests and screenings are paid in full while other may be subject to a deductible, co-insurance, and/or copay. Some of these benefits include a comprehensive review of your medical history including lifestyle, illnesses that may run in your family, and various assessments and screenings as appropriate. After reviewing your medical record and screening and assessments performed today your provider may have ordered immunizations, labs, imaging, and/or referrals for you. A list of these orders (if applicable) as well as your Preventive Care list are included within your After Visit Summary for your review. Other Preventive Recommendations:    · A preventive eye exam performed by an eye specialist is recommended every 1-2 years to screen for glaucoma; cataracts, macular degeneration, and other eye disorders. · A preventive dental visit is recommended every 6 months. · Try to get at least 150 minutes of exercise per week or 10,000 steps per day on a pedometer . · Order or download the FREE \"Exercise & Physical Activity: Your Everyday Guide\" from [employer]. Call 6-675.715.4991 or search [employer] online. · You need 5163-7640 mg of calcium and 9789-6032 IU of vitamin D per day. It is possible to meet your calcium requirement with diet alone, but a vitamin D supplement is usually necessary to meet this goal.  · When exposed to the sun, use a sunscreen that protects against both UVA and UVB radiation with an SPF of 30 or greater. Reapply every 2 to 3 hours or after sweating, drying off with a towel, or swimming. · Always wear a seat belt when traveling in a car. Always wear a helmet when riding a bicycle or motorcycle. Heart-Healthy Diet   Sodium, Fat, and Cholesterol Controlled Diet       What Is a Heart Healthy Diet?    A heart-healthy diet is one that limits sodium , certain types of fat , and cholesterol . This type of diet is recommended for:   People with any form of cardiovascular disease (eg, coronary heart disease , peripheral vascular disease , previous heart attack , previous stroke )   People with risk factors for cardiovascular disease, such as high blood pressure , high cholesterol , or diabetes   Anyone who wants to lower their risk of developing cardiovascular disease   Sodium    Sodium is a mineral found in many foods. In general, most people consume much more sodium than they need. Diets high in sodium can increase blood pressure and lead to edema (water retention). On a heart-healthy diet, you should consume no more than 2,300 mg (milligrams) of sodium per dayabout the amount in one teaspoon of table salt. The foods highest in sodium include table salt (about 50% sodium), processed foods, convenience foods, and preserved foods. Cholesterol    Cholesterol is a fat-like, waxy substance in your blood. Our bodies make some cholesterol. It is also found in animal products, with the highest amounts in fatty meat, egg yolks, whole milk, cheese, shellfish, and organ meats. On a heart-healthy diet, you should limit your cholesterol intake to less than 200 mg per day. It is normal and important to have some cholesterol in your bloodstream. But too much cholesterol can cause plaque to build up within your arteries, which can eventually lead to a heart attack or stroke. The two types of cholesterol that are most commonly referred to are:   Low-density lipoprotein (LDL) cholesterol  Also known as bad cholesterol, this is the cholesterol that tends to build up along your arteries. Bad cholesterol levels are increased by eating fats that are saturated or hydrogenated. Optimal level of this cholesterol is less than 100. Over 130 starts to get risky for heart disease.    High-density lipoprotein (HDL) cholesterol  Also known as good cholesterol, this type of cholesterol actually carries cholesterol away from your arteries and may, therefore, help lower your risk of having a heart attack. You want this level to be high (ideally greater than 60). It is a risk to have a level less than 40. You can raise this good cholesterol by eating olive oil, canola oil, avocados, or nuts. Exercise raises this level, too. Fat    Fat is calorie dense and packs a lot of calories into a small amount of food. Even though fats should be limited due to their high calorie content, not all fats are bad. In fact, some fats are quite healthful. Fat can be broken down into four main types. The good-for-you fats are:   Monounsaturated fat  found in oils such as olive and canola, avocados, and nuts and natural nut butters; can decrease cholesterol levels, while keeping levels of HDL cholesterol high   Polyunsaturated fat  found in oils such as safflower, sunflower, soybean, corn, and sesame; can decrease total cholesterol and LDL cholesterol   Omega-3 fatty acids  particularly those found in fatty fish (such as salmon, trout, tuna, mackerel, herring, and sardines); can decrease risk of arrhythmias, decrease triglyceride levels, and slightly lower blood pressure   The fats that you want to limit are:   Saturated fat  found in animal products, many fast foods, and a few vegetables; increases total blood cholesterol, including LDL levels   Animal fats that are saturated include: butter, lard, whole-milk dairy products, meat fat, and poultry skin   Vegetable fats that are saturated include: hydrogenated shortening, palm oil, coconut oil, cocoa butter   Hydrogenated or trans fat  found in margarine and vegetable shortening, most shelf stable snack foods, and fried foods; increases LDL and decreases HDL     It is generally recommended that you limit your total fat for the day to less than 30% of your total calories.  If you follow an 1800-calorie heart healthy diet, for week) Tofu Nuts or seeds (unsalted, dry-roasted), low-sodium peanut butter Dried peas, beans, and lentils   Any smoked, cured, salted, or canned meat, fish, or poultry (including [place], chipped beef, cold cuts, hot dogs, sausages, sardines, and anchovies) Poultry skins Breaded and/or fried fish or meats Canned peas, beans, and lentils Salted nuts   Fats and Oils   Olive oil and canola oil Low-sodium, low-fat salad dressings and mayonnaise   Butter, margarine, coconut and palm oils, augustin fat   Snacks, Sweets, and Condiments   Low-sodium or unsalted versions of broths, soups, soy sauce, and condiments Pepper, herbs, and spices; vinegar, lemon, or lime juice Low-fat frozen desserts (yogurt, sherbet, fruit bars) Sugar, cocoa powder, honey, syrup, jam, and preserves Low-fat, trans-fat free cookies, cakes, and pies Cesar and animal crackers, fig bars, mayda snaps   High-fat desserts Broth, soups, gravies, and sauces, made from instant mixes or other high-sodium ingredients Salted snack foods Canned olives Meat tenderizers, seasoning salt, and most flavored vinegars   Beverages   Low-sodium carbonated beverages Tea and coffee in moderation Soy milk   Commercially softened water   Suggestions   Make whole grains, fruits, and vegetables the base of your diet. Choose heart-healthy fats such as canola, olive, and flaxseed oil, and foods high in heart-healthy fats, such as nuts, seeds, soybeans, tofu, and fish. Eat fish at least twice per week; the fish highest in omega-3 fatty acids and lowest in mercury include salmon, herring, mackerel, sardines, and canned chunk light tuna. If you eat fish less than twice per week or have high triglycerides, talk to your doctor about taking fish oil supplements. Read food labels.    For products low in fat and cholesterol, look for fat free, low-fat, cholesterol free, saturated fat free, and trans fat freeAlso scan the Nutrition Facts Label, which lists saturated fat, trans fat, and cholesterol amounts. For products low in sodium, look for sodium free, very low sodium, low sodium, no added salt, and unsalted   Skip the salt when cooking or at the table; if food needs more flavor, get creative and try out different herbs and spices. Garlic and onion also add substantial flavor to foods. Trim any visible fat off meat and poultry before cooking, and drain the fat off after mcmillan. Use cooking methods that require little or no added fat, such as grilling, boiling, baking, poaching, broiling, roasting, steaming, stir-frying, and sauting. Avoid fast food and convenience food. They tend to be high in saturated and trans fat and have a lot of added salt. Talk to a registered dietitian for individualized diet advice. Last Reviewed: March 2011 Tamara Avalos MS, MPH, RD   Updated: 3/29/2011   ·     Keep Your Memory [place]       Many factors can affect your ability to remembera hectic lifestyle, aging, stress, chronic disease, and certain medicines. But, there are steps you can take to sharpen your mind and help preserve your memory. Challenge Your Brain   Regularly challenging your mind may help keeps it in top shape. Good mental exercises include:   Crossword puzzlesUse a dictionary if you need it; you will learn more that way. Brainteasers Try some! Crafts, such as wood working and sewing   Hobbies, such as gardening and building model airplanes   SocializingVisit old friends or join groups to meet new ones. Reading   Learning a new language   Taking a class, whether it be art history or marla chi   TravelingExperience the food, history, and culture of your destination   Learning to use a computer   Going to museums, the theater, or thought-provoking movies   Changing things in your daily life, such as reversing your pattern in the grocery store or brushing your teeth using your nondominant hand   Use Memory Aids   There is no need to remember every detail on your own.  These relaxation. Choose activities that calm you down, and make it routine. Manage Chronic Conditions    Side effects of high blood pressure , diabetes, and heart disease can interfere with mental function. Many of the lifestyle steps discussed here can help manage these conditions. Strive to eat a healthy diet, exercise regularly, get stress under control, and follow your doctor's advice for your condition. Minimize Medications    Talk to your doctor about the medicines that you take. Some may be unnecessary. Also, healthy lifestyle habits may lower the need for certain drugs. Last Reviewed: April 2010 Ezio Sibley MD   Updated: 4/13/2010   ·   Smoking Cessation  This document explains the best ways for you to quit smoking and new treatments to help. It lists new medicines that can double or triple your chances of quitting and quitting for good. It also considers ways to avoid relapses and concerns you may have about quitting, including weight gain. NICOTINE: A POWERFUL ADDICTION  If you have tried to quit smoking, you know how hard it can be. It is hard because nicotine is a very addictive drug. For some people, it can be as addictive as heroin or cocaine. Usually, people make 2 or 3 tries, or more, before finally being able to quit. Each time you try to quit, you can learn about what helps and what hurts. Quitting takes hard work and a lot of effort, but you can quit smoking. QUITTING SMOKING IS ONE OF THE MOST IMPORTANT THINGS YOU WILL EVER DO. You will live longer, feel better, and live better. The impact on your body of quitting smoking is felt almost immediately:  Within 20 minutes, blood pressure decreases. Pulse returns to its normal level. After 8 hours, carbon monoxide levels in the blood return to normal. Oxygen level increases. After 24 hours, chance of heart attack starts to decrease. Breath, hair, and body stop smelling like smoke. After 48 hours, damaged nerve endings begin to recover. Sense of taste and smell improve. After 72 hours, the body is virtually free of nicotine. Bronchial tubes relax and breathing becomes easier. After 2 to 12 weeks, lungs can hold more air. Exercise becomes easier and circulation improves. Quitting will reduce your risk of having a heart attack, stroke, cancer, or lung disease:  After 1 year, the risk of coronary heart disease is cut in half. After 5 years, the risk of stroke falls to the same as a nonsmoker. After 10 years, the risk of lung cancer is cut in half and the risk of other cancers decreases significantly. After 15 years, the risk of coronary heart disease drops, usually to the level of a nonsmoker. If you are pregnant, quitting smoking will improve your chances of having a healthy baby. The people you live with, especially your children, will be healthier. You will have extra money to spend on things other than cigarettes. FIVE KEYS TO QUITTING  Studies have shown that these 5 steps will help you quit smoking and quit for good. You have the best chances of quitting if you use them together:  Get ready. Get support and encouragement. Learn new skills and behaviors. Get medicine to reduce your nicotine addiction and use it correctly. Be prepared for relapse or difficult situations. Be determined to continue trying to quit, even if you do not succeed at first.  1. GET READY  Set a quit date. Change your environment. Get rid of ALL cigarettes, ashtrays, matches, and lighters in your home, car, and place of work. Do not let people smoke in your home. Review your past attempts to quit. Think about what worked and what did not. Once you quit, do not smoke. NOT EVEN A PUFF! 2. GET SUPPORT AND ENCOURAGEMENT  Studies have shown that you have a better chance of being successful if you have help. You can get support in many ways. Tell your family, friends, and coworkers that you are going to quit and need their support.  Ask them not to smoke around you. Talk to your caregivers (doctor, dentist, nurse, pharmacist, psychologist, and/or smoking counselor). Get individual, group, or telephone counseling and support. The more counseling you have, the better your chances are of quitting. Programs are available at [facility]. Call your local health department for information about programs in your area. Spiritual beliefs and practices may help some smokers quit. Quit meters are small computer programs online or downloadable that keep track of quit statistics, such as amount of \"quit-time,\" cigarettes not smoked, and money saved. Many smokers find one or more of the many self-help books available useful in helping them quit and stay off tobacco.  3. LEARN NEW SKILLS AND BEHAVIORS  Try to distract yourself from urges to smoke. Talk to someone, go for a walk, or occupy your time with a task. When you first try to quit, change your routine. Take a different route to work. Drink tea instead of coffee. Eat breakfast in a different place. Do something to reduce your stress. Take a hot bath, exercise, or read a book. Plan something enjoyable to do every day. Reward yourself for not smoking. Explore interactive web-based programs that specialize in helping you quit. 4. GET MEDICINE AND USE IT CORRECTLY  Medicines can help you stop smoking and decrease the urge to smoke. Combining medicine with the above behavioral methods and support can quadruple your chances of successfully quitting smoking. The [place] Food and Drug Administration (FDA) has approved 7 medicines to help you quit smoking. These medicines fall into 3 categories. Nicotine replacement therapy (delivers nicotine to your body without the negative effects and risks of smoking):  Nicotine gum: Available over-the-counter. Nicotine lozenges: Available over-the-counter. Nicotine inhaler: Available by prescription. Nicotine nasal spray: Available by prescription.   Nicotine skin patches (transdermal): Available by prescription and over-the-counter. Antidepressant medicine (helps people abstain from smoking, but how this works is unknown): Bupropion sustained-release (SR) tablets: Available by prescription. Nicotinic receptor partial agonist (simulates the effect of nicotine in your brain):  Varenicline tartrate tablets: Available by prescription. Ask your caregiver for advice about which medicines to use and how to use them. Carefully read the information on the package. Everyone who is trying to quit may benefit from using a medicine. If you are pregnant or trying to become pregnant, nursing an infant, you are under age 25, or you smoke fewer than 10 cigarettes per day, talk to your caregiver before taking any nicotine replacement medicines. You should stop using a nicotine replacement product and call your caregiver if you experience nausea, dizziness, weakness, vomiting, fast or irregular heartbeat, mouth problems with the lozenge or gum, or redness or swelling of the skin around the patch that does not go away. Do not use any other product containing nicotine while using a nicotine replacement product. Talk to your caregiver before using these products if you have diabetes, heart disease, asthma, stomach ulcers, you had a recent heart attack, you have high blood pressure that is not controlled with medicine, a history of irregular heartbeat, or you have been prescribed medicine to help you quit smoking. 5. BE PREPARED FOR RELAPSE OR DIFFICULT SITUATIONS  Most relapses occur within the first 3 months after quitting. Do not be discouraged if you start smoking again. Remember, most people try several times before they finally quit. You may have symptoms of withdrawal because your body is used to nicotine. You may crave cigarettes, be irritable, feel very hungry, cough often, get headaches, or have difficulty concentrating. The withdrawal symptoms are only temporary.  They are strongest when you first quit, but they will go away within 10 to 14 days. Here are some difficult situations to watch for:  Alcohol. Avoid drinking alcohol. Drinking lowers your chances of successfully quitting. Caffeine. Try to reduce the amount of caffeine you consume. It also lowers your chances of successfully quitting. Other smokers. Being around smoking can make you want to smoke. Avoid smokers. Weight gain. Many smokers will gain weight when they quit, usually less than 10 pounds. Eat a healthy diet and stay active. Do not let weight gain distract you from your main goal, quitting smoking. Some medicines that help you quit smoking may also help delay weight gain. You can always lose the weight gained after you quit. Bad mood or depression. There are a lot of ways to improve your mood other than smoking. If you are having problems with any of these situations, talk to your caregiver. SPECIAL SITUATIONS AND CONDITIONS  Studies suggest that everyone can quit smoking. Your situation or condition can give you a special reason to quit. Pregnant women/new mothers: By quitting, you protect your baby's health and your own. Hospitalized patients: By quitting, you reduce health problems and help healing. Heart attack patients: By quitting, you reduce your risk of a second heart attack. Lung, head, and neck cancer patients: By quitting, you reduce your chance of a second cancer. Parents of children and adolescents: By quitting, you protect your children from illnesses caused by secondhand smoke. QUESTIONS TO THINK ABOUT  Think about the following questions before you try to stop smoking. You may want to talk about your answers with your caregiver. Why do you want to quit? If you tried to quit in the past, what helped and what did not? What will be the most difficult situations for you after you quit? How will you plan to handle them? Who can help you through the tough times? Your family? Friends? that are right for you. Speak to your doctor or pharmacist about the possible side effects of your medicines. A number of medicines can cause dizziness. If you have problems with sleep, talk to your doctor. Limit your intake of alcohol. If necessary, use a cane or walker to help maintain your balance. Wear supportive, rubber-soled shoes, even at home. If you live in a region that gets wintry weather, you may want to put special cleats on your shoes to prevent you from slipping on the snow and ice. Exercise regularly to help maintain muscle tone, agility, and balance. Always hold the banister when going up or down stairs. Also, use  bars when getting in or out of the bath or shower, or using the toilet. To avoid dizziness, get up slowly from a lying down position. Sit up first, dangling your legs for a minute or two before rising to a standing position. Overall Home Safety Check   According to the Consumer Product Safety Commision's \"Older Consumer Home Safety Checklist,\" it is important to check for potential hazards in each room. And remember, proper lighting is an essential factor in home safety. If you cannot see clearly, you are more likely to fall. Important questions to ask yourself include:   Are lamp, electric, extension, and telephone cords placed out of the flow of traffic and maintained in good condition? Have frayed cords been replaced? Are all small rugs and runners slip resistant? If not, you can secure them to the floor with a special double-sided carpet tape. Are smoke detectors properly locatedone on every floor of your home and one outside of every sleeping area? Are they in good working order? Are batteries replaced at least once a year? Do you have a well-maintained carbon monoxide detector outside every sleeping are in your home? Does your furniture layout leave plenty of space to maneuver between and around chairs, tables, beds, and sofas?    Are hallways, stairs and passages between rooms well lit? Can you reach a lamp without getting out of bed? Are floor surfaces well maintained? Shag rugs, high-pile carpeting, tile floors, and polished wood floors can be particularly slippery. Stairs should always have handrails and be carpeted or fitted with a non-skid tread. Is your telephone easily reachable. Is the cord safely tucked away? Room by Room   According to the [employer], bathrooms and gonzales are the two most potentially hazardous rooms in your home. In the Kitchen    Be sure your stove is in proper working order and always make sure burners and the oven are off before you go out or go to sleep. Keep pots on the back burners, turn handles away from the front of the stove, and keep stove clean and free of grease build-up. Kitchen ventilation systems and range exhausts should be working properly. Keep flammable objects such as towels and pot holders away from the cooking area except when in use. Make sure kitchen curtains are tied back. Move cords and appliances away from the sink and hot surfaces. If extension cords are needed, install wiring guides so they do not hang over the sink, range, or working areas. Look for coffee pots, kettles and toaster ovens with automatic shut-offs. Keep a mop handy in the kitchen so you can wipe up spills instantly. You should also have a small fire extinguisher. Arrange your kitchen with frequently used items on lower shelves to avoid the need to stand on a stepstool to reach them. Make sure countertops are well-lit to avoid injuries while cutting and preparing food. In the Bathroom    Use a non-slip mat or decals in the tub and shower, since wet, soapy tile or porcelain surfaces are extremely slippery. Make sure bathroom rugs are non-skid or tape them firmly to the floor. Bathtubs should have at least one, preferably two, grab bars, firmly attached to structural supports in the wall.  (Do not use built-in soap holders or glass shower doors as grab bars.)    Tub seats fitted with non-slip material on the legs allow you to wash sitting down. For people with limited mobility, bathtub transfer benches allow you to slide safely into the tub. Raised toilet seats and toilet safety rails are helpful for those with knee or hip problems. In the [facility]    Make sure you use a nightlight and that the area around your bed is clear of potential obstacles. Be careful with electric blankets and never go to sleep with a heating pad, which can cause serious burns even if on a low setting. Use fire-resistant mattress covers and pillows, and NEVER smoke in bed. Keep a phone next to the bed that is programmed to dial 911 at the push of a button. If you have a chronic condition, you may want to sign on with an automatic call-in service. Typically the system includes a small pendant that connects directly to an emergency medical voice-response system. You should also make arrangements to stay in contact with someonefriend, neighbor, family memberon a regular schedule. Fire Prevention   According to the [employer]. (Smoke Alarms for Every) [address] [place] [address], senior citizens are one of the two highest risk groups for death and serious injuries due to residential fires. When cooking, wear short-sleeved items, never a bulky long-sleeved robe. The [facility]'s Safety Checklist for Older Consumers emphasizes the importance of checking basements, garages, workshops and storage areas for fire hazards, such as volatile liquids, piles of old rags or clothing and overloaded circuits. Never smoke in bed or when lying down on a couch or recliner chair. Small portable electric or kerosene heaters are responsible for many home fires and should be used cautiously if at all. If you do use one, be sure to keep them away from flammable materials.     In case of fire, make sure you have a pre-established emergency exit plan. Have a professional check your fireplace and other fuel-burning appliances yearly. Helping Hands   Baby boomers entering the mirza years will continue to see the development of new products to help older adults live safely and independently in spite of age-related changes. Making Life More Livable  , by Josette Navas, lists over 1,000 products for \"living well in the mature years,\" such as bathing and mobility aids, household security devices, ergonomically designed knives and peelers, and faucet valves and knobs for temperature control. Medical supply stores and organizations are good sources of information about products that improve your quality of life and insure your safety. Last Reviewed: November 2009 Teri Conley MD   Updated: 3/7/2011     ·        Learning About Living [place]  What is a living will? A living will, also called a declaration, is a legal form. It tells your family and your doctor your wishes when you can't speak for yourself. It's used by the health professionals who will treat you as you near the end of your life or if you get seriously hurt or ill. If you put your wishes in writing, your loved ones and others will know what kind of care you want. They won't need to guess. This can ease your mind and be helpful to others. And you can change or cancel your living will at any time. A living will is not the same as an estate or property will. An estate will explains what you want to happen with your money and property after you die. How do you use it? A living will is used to describe the kinds of treatment or life support you want as you near the end of your life or if you get seriously hurt or ill. Keep these facts in mind about living rincon. Your living will is used only if you can't speak or make decisions for yourself. Most often, one or more doctors must certify that you can't speak or decide for yourself before your living will takes effect.   If you get better and can speak for yourself again, you can accept or refuse any treatment. It doesn't matter what you said in your living will. Some states may limit your right to refuse treatment in certain cases. For example, you may need to clearly state in your living will that you don't want artificial hydration and nutrition, such as being fed through a tube. Is a living will a legal document? A living will is a legal document. Each state has its own laws about living rincon. And a living will may be called something else in your state. Here are some things to know about living rincon. You don't need an  to complete a living will. But legal advice can be helpful if your state's laws are unclear. It can also help if your health history is complicated or your family can't agree on what should be in your living will. You can change your living will at any time. Some people find that their wishes about end-of-life care change as their health changes. If you make big changes to your living will, complete a new form. If you move to another state, make sure that your living will is legal in the state where you now live. In most cases, doctors will respect your wishes even if you have a form from a different state. You might use a universal form that has been approved by many states. This kind of form can sometimes be filled out and stored online. Your digital copy will then be available wherever you have a connection to the internet. The doctors and nurses who need to treat you can find it right away. Your state may offer an online registry. This is another place where you can store your living will online. It's a good idea to get your living will notarized. This means using a person called a  to watch two people sign, or witness, your living will. What should you know when you create a living will?   Here are some questions to ask yourself as you make your living will:  Do you know enough about life support methods that might be used? If not, talk to your doctor so you know what might be done if you can't breathe on your own, your heart stops, or you can't swallow. What things would you still want to be able to do after you receive life-support methods? Would you want to be able to walk? To speak? To eat on your own? To live without the help of machines? Do you want certain Confucianist practices performed if you become very ill? If you have a choice, where do you want to be cared for? In your home? At a hospital or nursing home? If you have a choice at the end of your life, where would you prefer to die? At home? In a hospital or nursing home? Somewhere else? Would you prefer to be buried or cremated? Do you want your organs to be donated after you die? What should you do with your living will? Make sure that your family members and your health care agent have copies of your living will (also called a declaration). Give your doctor a copy of your living will. Ask him or her to keep it as part of your medical record. If you have more than one doctor, make sure that each one has a copy. Put a copy of your living will where it can be easily found. For example, some people may put a copy on their refrigerator door. If you are using a digital copy, be sure your doctor, family members, and health care agent know how to find and access it. Where can you learn more? Go to [email or web address] and sign in to your [employer] account. Enter H648 in the [facility] box to learn more about \"Learning About Living [place]. \"     If you do not have an account, please click on the \"Sign Up Now\" link. Current as of: December 9, 2019               Content Version: 12.6  © 0715-9855 [employer]. Care instructions adapted under license by [facility] ([facility]).  If you have questions about a medical condition or this instruction, always ask your healthcare professional. [employer] disclaims any warranty or liability for your use of this information.     ·

## 2021-03-08 NOTE — TELEPHONE ENCOUNTER
During medicare awv patient showed me a rash on his R Thigh. Patient did remove his pants to show me the rash in which I had Reji Hurley MA in the room with me to observe. The patient had a rash on his R thigh that looks similar to the pattern of a honey comb that were large hexagon in shape. The rash was a dark red, started down by the knee and went up the left side of the thigh towards the groin, the left side of the knee has slight edema which patient states that it does not always have edema in it. Patient states that the rash does not hurt or bother him, and started over a year ago. I just wanted to fyi you to take a look at his next appointment or bring him in sooner.

## 2021-03-08 NOTE — PROGRESS NOTES
Medicare Annual Wellness Visit  Name: Kendall Kaye Date: 3/8/2021   MRN: [id number] Sex: Male   Age: 78 y.o. Ethnicity: Non-/Non    : 1940 Race: White      Chris William is here for Medicare AWV    Screenings for behavioral, psychosocial and functional/safety risks, and cognitive dysfunction are all negative except as indicated below. These results, as well as other patient data from the [address] form, are documented in Flowsheets linked to this Encounter. Allergies   Allergen Reactions    Other      \"Allergic To All Cholesterol Medications Causing Muscles To Ache\"    Atorvastatin      Patient states he is allergic to all cholesterol medications    Flomax [Tamsulosin Hcl] Other (See Comments)     Headache    Proscar [Finasteride] Other (See Comments)     Headache       Prior to Visit Medications    Medication Sig Taking? Authorizing Provider   alirocumab (PRALUENT) 75 MG/ML SOSY injection prefilled syringe Inject 1 mL into the skin every 14 days Yes Johnny Artis MD   amLODIPine (NORVASC) 10 MG tablet Take 1 tablet by mouth daily Yes Johnny Artis MD   trihexyphenidyl (ARTANE) 2 MG tablet TAKE 1 TABLET BY MOUTH EVERY DAY Yes Johnny Artis MD   warfarin (COUMADIN) 2 MG tablet Take 1 tab by mouth daily Yes Johnny Artis MD   levothyroxine (SYNTHROID) 25 MCG tablet Take 1 tablet by mouth Daily Yes Johnny Artis MD   gabapentin (NEURONTIN) 100 MG capsule Take 1 capsule by mouth 2 times daily for 90 days. Patient taking differently: Take 100 mg by mouth 3 times daily.   Yes Johnny Artis MD   rOPINIRole (REQUIP) 0.25 MG tablet Take 1 tablet by mouth daily Yes Historical Provider, MD   trimethoprim (TRIMPEX) 100 MG tablet  Yes Historical Provider, MD   [address] Yes Historical Provider, Edward Huber, (SAW PALMETTO BERRY PO) Take by mouth Yes Historical Provider, MD fluticasone-salmeterol (ADVAIR HFA) 230-21 MCG/ACT inhaler Inhale 2 puffs into the lungs 2 times daily Yes Laura Corbett MD   famotidine (PEPCID) 20 MG tablet Take 0.5 tablets by mouth 2 times daily Yes Merrill Stanley,    MAGNESIUM CHLORIDE PO Take 1 each by mouth daily  Yes Historical Provider, MD   aspirin 81 MG EC tablet Take 1 tablet by mouth daily Yes Laura Corbett MD   Multiple Vitamins-Minerals (CENTRUM PO) Take 1 tablet by mouth nightly Over The Counter  Yes Historical Provider, MD   Cholecalciferol (VITAMIN D PO) Take 2,000 Units by mouth 2 times daily Over The Counter  Yes Historical Provider, MD       Past Medical History:   Diagnosis Date    Acid reflux     Adenomatous polyp of colon 1-2009    last colonoscopy recomm 3 yr follow up    Anticoagulated 2010    takes for afib Dr Zhang Bowman monitors INR 1.2 on 02/08/2018    Arthritis     \"arthritis in low back\"    CAD (coronary artery disease)     follow with dr Lacie Oscar- cardiac clearance for surgery done 10/31/2017 on chart)    Chronic atrial fibrillation (HCC)     Sees Dr. Christianne Brunner COPD (chronic obstructive pulmonary disease) ([facility] [address]     follows with dr Devyn Olson Diverticulitis     Diverticulosis of colon     left colon    Enlarged prostate     \"had to ream me out a couple of times\":   [facility]    R codominant, RCA patent, LAD patent with minimal nonobstructive irreg, CIRC patent and codominant, RI small but patent    H/O cardiovascular stress test 5/18/10    EF81% decrease in perfusion in inferoseptal segments c/w paced rhythm, LV small in volume    H/O cardiovascular stress test 5/5/2016    lexiscan-normal,EF70%    H/O cardiovascular stress test 6/2/2016    treadmill    H/O Doppler Carotid ultrasound 11/01/2019    Mild (0-49%) disease of the Bilateral proximal Internal carotid artery.     H/O Doppler ultrasound 06/15/2016    minimal plaque throughout bilaterally    H/O echocardiogram 5/3/16    EF 50-55%. Mild concentric LV hypertrophy with low normal systolic function. Mod bilateral atrial and right ventricle dilatation. Mild AR, MR, TR, WV. Absence of pericardial effsion.  H/O echocardiogram 8/4/11    EF(not given in faxed records) mild MR, mod LAE, mild AR    H/O echocardiogram 09/18/2019    EF 55-60%,  The left atrium is moderate to severely dilated.  H/O percutaneous left heart catheterization 5/12/16    left circumflex is 60% very distal stenosis at the bifurcation, OM1 is 99% mid segment stenosis    H/O unstable angina     History of Doppler ultrasound 3/29/12    Abd Ao Duplex - no abdominal aoritic aneurysm or iliac aneurysm identified    History of Doppler ultrasound 10/19/10    carotid - 0-19% bilateral ICAs, R and L carotid system show minimal atheromatous diseassse without stenosis    History of Holter monitoring 10/19/10    rhythm is ventricular paced with underlying a-fib    History of nuclear stress test 02/06/2020    Normal study.     Hx of CT scan of chest 8/17/10    mod-severe centrilobular emphysematous changes    HX OTHER MEDICAL     Primary Care Physician Is Dr. Noe Justice    Hyperlipidemia     Hypertension     Hypothyroidism due to acquired atrophy of thyroid 2/29/2020    Kidney stones \"Been Having Kidney Stones For 39 Years\"    Passed Kidney Stones Several Times    Nephrolithiasis     with hematuria episodes followed by Dr Galicia Erp Nocturia     Pacemaker 1-8382    S/P AV ablation    Peptic ulcer, unspecified site, unspecified as acute or chronic, without mention of hemorrhage or perforation 1983    per EGD    Pneumonia Dx 2000's    Shortness of breath     Skin Cancer Arms And Ears In Past    Excision Skin Cancer Arms And Ears    Sleep apnea     No CPAP- had sleep study done 10+ yrs ago    Stented coronary artery 5/12/16    3.0 X 12 MM ELIGIO stent to OM 1    Teeth missing     Lower    Wears dentures     Full Lower    Wears glasses        Past Surgical History:   Procedure Laterality Date   Georgianna Olszewski CARDIAC PACEMAKER PLACEMENT  5-3-99, 1-29-14    Medtronic Sensia SR Pacemaker Implanted    COLONOSCOPY  8-12 - Dr Oviedo Rough    Polyps Removed In Past    CORONARY ANGIOPLASTY WITH STENT PLACEMENT      per old chart had cath with stent placement done 5/2016    CYSTOSCOPY Left 11/17/2017    cysto, left retrograde, flugeration of prostate    DENTAL SURGERY      Teeth Extracted In Past    ENDOSCOPY, COLON, DIAGNOSTIC  1983    KNEE SURGERY Right 1966    \"shaved the kneecap\"    LITHOTRIPSY  2009, 2014    Kidney Stones    PACEMAKER PLACEMENT  5-3-99    Medtronic [place] SR Pacemaker Implanted    PACEMAKER PLACEMENT  1-29-14    Medtronic [place] SR Pacemaker Implanted( had to replace the whole thing in 2014- that was my 3rd one - first one in [address] [place] [address] and next one 2007\"    SKIN CANCER EXCISION  In Past    Skin Cancer Excised Arms And Ears       Family History   Problem Relation Age of Onset    Heart Disease Mother     Cancer Father 76        Leukemia    Heart Disease Father         Atrial Fib    Cancer Sister         Leukemia    Cancer Brother         Leukemia       CareTeam (Including outside providers/suppliers regularly involved in providing care):   Patient Care Team:  Cornelius Ortiz MD as PCP - General (Family Medicine)  Cornelius Ortiz MD as PCP - [facility] Empaneled Provider  Benedict Rodriguez MD as Consulting Physician (Pulmonology)  Maggi Nickerson MD as Consulting Physician (Cardiology)  Quinn Gutierrez MD as Consulting Physician (Cardiology)    Wt Readings from Last 3 Encounters:   03/08/21 214 lb (97.1 kg)   01/07/21 213 lb 3.2 oz (96.7 kg)   11/11/20 222 lb 6.4 oz (100.9 kg)     Vitals:    03/08/21 1106   BP: 135/78   Pulse: 80   SpO2: 97%   Weight: 214 lb (97.1 kg)   Height: 5' 9\" (1.753 m)     Body mass index is 31.6 kg/m². Based upon direct observation of the patient, evaluation of cognition reveals recent and remote memory intact.     Patient's complete Health Risk Assessment and screening values have been reviewed and are found in Flowsheets. The following problems were reviewed today and where indicated follow up appointments were made and/or referrals ordered. Positive Risk Factor Screenings with Interventions:     Fall Risk:  Timed Up and Go Test > 12 seconds? (Complete if either Fall Risk answers are Yes): no  2 or more falls in past year?: (!) yes  Fall with injury in past year?: no  Fall Risk Interventions:    · Home safety tips provided       Substance History:  Social History     Tobacco History     Smoking Status  Current Every Day Smoker Smoking Start Date  8/14/1950 Smoking Frequency  0.5 packs/day for 79 years (33.5 pk yrs) Smoking Tobacco Type  Cigarettes    Smokeless Tobacco Use  Never Used          Alcohol History     Alcohol Use Status  No          Drug Use     Drug Use Status  No          Sexual Activity     Sexually Active  Never               Alcohol Screening:       A score of 8 or more is associated with harmful or hazardous drinking. A score of 13 or more in women, and 15 or more in men, is likely to indicate alcohol dependence.   Substance Abuse Interventions:  · Tobacco abuse:  tobacco cessation tips and resources provided     Health Habits/Nutrition:  Health Habits/Nutrition  Do you exercise for at least 20 minutes 2-3 times per week?: Yes  Have you lost any weight without trying in the past 3 months?: No  Do you eat only one meal per day?: No  Have you seen the dentist within the past year?: N/A - wear dentures  Body mass index: (!) 31.6  Health Habits/Nutrition Interventions:  · Nutritional issues:  educational materials for healthy, well-balanced diet provided    Hearing/Vision:  No exam data present  Hearing/Vision  Do you or your family notice any trouble with your hearing that hasn't been managed with hearing aids?: No  Do you have difficulty driving, watching TV, or doing any of your daily activities because of your eyesight?: No  Have you had an eye exam within the past year?: (!) No  Hearing/Vision Interventions:  · Vision concerns:  patient encouraged to make appointment with his/her eye specialist    Safety:  Safety  Do you have working smoke detectors?: Yes  Have all throw rugs been removed or fastened?: Yes  Do you have non-slip mats or surfaces in all bathtubs/showers?: (!) No  Do all of your stairways have a railing or banister?: Yes  Are your doorways, halls and stairs free of clutter?: Yes  Do you always fasten your seatbelt when you are in a car?: Yes  Safety Interventions:  · Home safety tips provided     Personalized Preventive Plan   Current Health Maintenance Status  Immunization History   Administered Date(s) Administered    Influenza Virus Vaccine 12/05/2012    Influenza, High Dose (Fluzone 65 yrs and older) 10/02/2013, 10/06/2014, 10/15/2015, 09/29/2016, 11/06/2017, 11/14/2018    Influenza, Triv, inactivated, subunit, adjuvanted, IM (Fluad 65 yrs and older) 11/22/2019, 11/04/2020    Pneumococcal Conjugate 13-valent (Dinora Kirkland) 03/02/2015    Pneumococcal Polysaccharide ([id number]) 10/15/2003, 03/23/2009, 12/05/2012    Td vaccine (adult) 03/23/2009    Td, unspecified formulation 08/04/2003    Tdap (Boostrix, Adacel) 10/20/2015, 07/22/2018        Health Maintenance   Topic Date Due    Shingles Vaccine (1 of 2) Never done    PSA counseling  08/14/2018    Annual Wellness Visit (AWV)  Never done    Potassium monitoring  10/28/2021    Creatinine monitoring  10/28/2021    DTaP/Tdap/Td vaccine (3 - Td) 07/22/2028    Flu vaccine  Completed    Pneumococcal 65+ years Vaccine  Completed    COVID-19 Vaccine  Completed    Hepatitis A vaccine  Aged Out    Hib vaccine  Aged Out    Meningococcal (ACWY) vaccine  Aged Out     Recommendations for Goozzy Due: see orders and patient instructions/AVS.  .   Recommended screening schedule for the next 5-10 years is provided to the patient in written form: see Patient Instructions/AVS.    Barbara Ramey LPN, 7/8/9850, performed the documented evaluation under the direct supervision of the attending physician.

## 2021-03-31 ENCOUNTER — PROCEDURE VISIT (OUTPATIENT)
Dept: CARDIOLOGY CLINIC | Age: 81
End: 2021-03-31
Payer: MEDICARE

## 2021-03-31 VITALS — TEMPERATURE: 97.2 F | HEART RATE: 62 BPM | SYSTOLIC BLOOD PRESSURE: 124 MMHG | DIASTOLIC BLOOD PRESSURE: 60 MMHG

## 2021-03-31 DIAGNOSIS — Z95.0 CARDIAC PACEMAKER IN SITU: Primary | ICD-10-CM

## 2021-03-31 PROCEDURE — 93279 PRGRMG DEV EVAL PM/LDLS PM: CPT | Performed by: INTERNAL MEDICINE

## 2021-03-31 NOTE — PROCEDURES
Jac Nuñez Maryanasarahnt  78 y.o., male  1940    Olimpia Oconnell MD    Chief Complaint   Patient presents with    Procedure     Pacemaker check     Vitals:    03/31/21 1004   BP: 124/60   Pulse: 62   Temp: 97.2 °F (36.2 °C)     Pacer analysis is reviewed and filed in the pacer chart. Analysis is consistent with normal single-chamber non-MRI Medtronic pacer function with stable RV lead and appropriate battery status for the age of the device. Remaining average battery longevity is 10 months. Pacer is 92.2% pacing in the ventricle programmed to VVIR rate of 60 bpm.    Recommend continued every three month pacer check and follow up office visit as scheduled.     Alma Rico MD, 3/31/2021 11:06 AM

## 2021-04-02 DIAGNOSIS — E03.4 HYPOTHYROIDISM DUE TO ACQUIRED ATROPHY OF THYROID: ICD-10-CM

## 2021-04-02 DIAGNOSIS — I10 ESSENTIAL HYPERTENSION: ICD-10-CM

## 2021-04-02 DIAGNOSIS — E78.2 MIXED HYPERLIPIDEMIA: ICD-10-CM

## 2021-04-02 DIAGNOSIS — E11.69 TYPE 2 DIABETES MELLITUS WITH OTHER SPECIFIED COMPLICATION, UNSPECIFIED WHETHER LONG TERM INSULIN USE (HCC): ICD-10-CM

## 2021-04-02 LAB
A/G RATIO: 1.6 (ref 1.1–2.2)
ALBUMIN SERPL-MCNC: 4.6 G/DL (ref 3.4–5)
ALP BLD-CCNC: 142 U/L (ref 40–129)
ALT SERPL-CCNC: 10 U/L (ref 10–40)
ANION GAP SERPL CALCULATED.3IONS-SCNC: 13 MMOL/L (ref 3–16)
AST SERPL-CCNC: 16 U/L (ref 15–37)
BASOPHILS ABSOLUTE: 0.1 K/UL (ref 0–0.2)
BASOPHILS RELATIVE PERCENT: 0.7 %
BILIRUB SERPL-MCNC: 0.4 MG/DL (ref 0–1)
BUN BLDV-MCNC: 29 MG/DL (ref 7–20)
CALCIUM SERPL-MCNC: 9.4 MG/DL (ref 8.3–10.6)
CHLORIDE BLD-SCNC: 105 MMOL/L (ref 99–110)
CHOLESTEROL, TOTAL: 197 MG/DL (ref 0–199)
CO2: 26 MMOL/L (ref 21–32)
CREAT SERPL-MCNC: 1.2 MG/DL (ref 0.8–1.3)
EOSINOPHILS ABSOLUTE: 0.2 K/UL (ref 0–0.6)
EOSINOPHILS RELATIVE PERCENT: 1.7 %
GFR AFRICAN AMERICAN: >60
GFR NON-AFRICAN AMERICAN: 58
GLOBULIN: 2.9 G/DL
GLUCOSE BLD-MCNC: 103 MG/DL (ref 70–99)
HCT VFR BLD CALC: 51 % (ref 40.5–52.5)
HDLC SERPL-MCNC: 63 MG/DL (ref 40–60)
HEMOGLOBIN: 16.8 G/DL (ref 13.5–17.5)
LDL CHOLESTEROL CALCULATED: 112 MG/DL
LYMPHOCYTES ABSOLUTE: 1.9 K/UL (ref 1–5.1)
LYMPHOCYTES RELATIVE PERCENT: 22 %
MCH RBC QN AUTO: 32.6 PG (ref 26–34)
MCHC RBC AUTO-ENTMCNC: 32.9 G/DL (ref 31–36)
MCV RBC AUTO: 99.2 FL (ref 80–100)
MONOCYTES ABSOLUTE: 0.9 K/UL (ref 0–1.3)
MONOCYTES RELATIVE PERCENT: 9.8 %
NEUTROPHILS ABSOLUTE: 5.7 K/UL (ref 1.7–7.7)
NEUTROPHILS RELATIVE PERCENT: 65.8 %
PDW BLD-RTO: 15.3 % (ref 12.4–15.4)
PLATELET # BLD: 212 K/UL (ref 135–450)
PMV BLD AUTO: 9.8 FL (ref 5–10.5)
POTASSIUM SERPL-SCNC: 4.6 MMOL/L (ref 3.5–5.1)
RBC # BLD: 5.15 M/UL (ref 4.2–5.9)
SODIUM BLD-SCNC: 144 MMOL/L (ref 136–145)
TOTAL PROTEIN: 7.5 G/DL (ref 6.4–8.2)
TRIGL SERPL-MCNC: 108 MG/DL (ref 0–150)
TSH SERPL DL<=0.05 MIU/L-ACNC: 3.35 UIU/ML (ref 0.27–4.2)
VLDLC SERPL CALC-MCNC: 22 MG/DL
WBC # BLD: 8.7 K/UL (ref 4–11)

## 2021-04-03 LAB
ESTIMATED AVERAGE GLUCOSE: 137 MG/DL
HBA1C MFR BLD: 6.4 %

## 2021-04-08 ENCOUNTER — OFFICE VISIT (OUTPATIENT)
Dept: INTERNAL MEDICINE CLINIC | Age: 81
End: 2021-04-08
Payer: MEDICARE

## 2021-04-08 VITALS
TEMPERATURE: 97.3 F | DIASTOLIC BLOOD PRESSURE: 70 MMHG | SYSTOLIC BLOOD PRESSURE: 100 MMHG | HEART RATE: 74 BPM | OXYGEN SATURATION: 96 % | BODY MASS INDEX: 31.4 KG/M2 | WEIGHT: 212.6 LBS

## 2021-04-08 DIAGNOSIS — R21 RASH: ICD-10-CM

## 2021-04-08 DIAGNOSIS — E78.2 MIXED HYPERLIPIDEMIA: ICD-10-CM

## 2021-04-08 DIAGNOSIS — I15.9 HYPERTENSION, SECONDARY: Primary | ICD-10-CM

## 2021-04-08 DIAGNOSIS — R73.03 PREDIABETES: ICD-10-CM

## 2021-04-08 PROCEDURE — 4040F PNEUMOC VAC/ADMIN/RCVD: CPT | Performed by: FAMILY MEDICINE

## 2021-04-08 PROCEDURE — G8417 CALC BMI ABV UP PARAM F/U: HCPCS | Performed by: FAMILY MEDICINE

## 2021-04-08 PROCEDURE — 99214 OFFICE O/P EST MOD 30 MIN: CPT | Performed by: FAMILY MEDICINE

## 2021-04-08 PROCEDURE — 1123F ACP DISCUSS/DSCN MKR DOCD: CPT | Performed by: FAMILY MEDICINE

## 2021-04-08 PROCEDURE — 4004F PT TOBACCO SCREEN RCVD TLK: CPT | Performed by: FAMILY MEDICINE

## 2021-04-08 PROCEDURE — G8427 DOCREV CUR MEDS BY ELIG CLIN: HCPCS | Performed by: FAMILY MEDICINE

## 2021-04-08 ASSESSMENT — ENCOUNTER SYMPTOMS
ABDOMINAL PAIN: 0
COLOR CHANGE: 0
CHEST TIGHTNESS: 0
VOMITING: 0
DIARRHEA: 0
SORE THROAT: 0
CONSTIPATION: 0
SHORTNESS OF BREATH: 0

## 2021-04-08 NOTE — PROGRESS NOTES
Subjective:      Chief Complaint   Patient presents with    3 Month Follow-Up       HPI:  Cathryn Muller is a 78 y.o. male who presents today for follow up of chronic conditions as listed below. Would like to stop taking praluent due to muscle aches/weakness (mostly in his thighs). States he has been having mild symptoms for a while, but feels they have been getting worse recently. Has been tried on multiple statins with the same issue. Has cardiology follow up in a few weeks. States he has been trying to make dietary modifications and decrease soda intake. Has had a dark rash on his inner leg (around his R knee) for over a year. Not painful or itchy, does not bother him at all but has been slowly growing and is not sure what it is from. Has not tried any treatment. Denies starting any new medications prior to rash. Otherwise doing well today, no acute concerns.        Past Medical History:   Diagnosis Date    Acid reflux     Adenomatous polyp of colon 1-2009    last colonoscopy recomm 3 yr follow up    Anticoagulated 2010    takes for afib Dr Josephine Cordero monitors INR 1.2 on 02/08/2018    Arthritis     \"arthritis in low back\"    CAD (coronary artery disease)     follow with dr Carlee Renteria- cardiac clearance for surgery done 10/31/2017 on chart)    Chronic atrial fibrillation (HCC)     Sees Dr. Yudith Suero COPD (chronic obstructive pulmonary disease) ([facility] [address]     follows with dr Al Ibrahim Diverticulitis     Diverticulosis of colon     left colon    Enlarged prostate     \"had to ream me out a couple of times\":   [facility] H/O cardiac catheterization 1/31/2006    R codominant, RCA patent, LAD patent with minimal nonobstructive irreg, CIRC patent and codominant, RI small but patent    H/O cardiovascular stress test 5/18/10    EF81% decrease in perfusion in inferoseptal segments c/w paced rhythm, LV small in volume    H/O cardiovascular stress test 5/5/2016    lexiscan-normal,EF70%    H/O cardiovascular stress test 6/2/2016    treadmill    H/O Doppler Carotid ultrasound 11/01/2019    Mild (0-49%) disease of the Bilateral proximal Internal carotid artery.  H/O Doppler ultrasound 06/15/2016    minimal plaque throughout bilaterally    H/O echocardiogram 5/3/16    EF 50-55%. Mild concentric LV hypertrophy with low normal systolic function. Mod bilateral atrial and right ventricle dilatation. Mild AR, MR, TR, [place]. Absence of pericardial effsion.  H/O echocardiogram 8/4/11    EF(not given in faxed records) mild MR, mod LAE, mild AR    H/O echocardiogram 09/18/2019    EF 55-60%,  The left atrium is moderate to severely dilated.  H/O percutaneous left heart catheterization 5/12/16    left circumflex is 60% very distal stenosis at the bifurcation, OM1 is 99% mid segment stenosis    H/O unstable angina     History of Doppler ultrasound 3/29/12    Abd Ao Duplex - no abdominal aoritic aneurysm or iliac aneurysm identified    History of Doppler ultrasound 10/19/10    carotid - 0-19% bilateral ICAs, R and L carotid system show minimal atheromatous diseassse without stenosis    History of Holter monitoring 10/19/10    rhythm is ventricular paced with underlying a-fib    History of nuclear stress test 02/06/2020    Normal study.     Hx of CT scan of chest 8/17/10    mod-severe centrilobular emphysematous changes    HX OTHER MEDICAL     Primary Care Physician Is Dr. Fitz Metzger    Hyperlipidemia     Hypertension     Hypothyroidism due to acquired atrophy of thyroid 2/29/2020    Kidney stones \"Been Having Kidney Stones For 39 Years\"    Passed Kidney Stones Several Times    Nephrolithiasis     with hematuria episodes followed by Dr Judy Loaiza Nocturia     Pacemaker 4-8432    S/P AV ablation    Peptic ulcer, unspecified site, unspecified as acute or chronic, without mention of hemorrhage or perforation 1983    per EGD    Pneumonia Dx 2000's    Shortness of breath     Skin Cancer Arms And Ears In Past Negative for color change. Neurological: Negative for dizziness and light-headedness. Psychiatric/Behavioral: Negative for dysphoric mood. The patient is not nervous/anxious. Prior to Visit Medications    Medication Sig Taking? Authorizing Provider   amLODIPine (NORVASC) 10 MG tablet Take 1 tablet by mouth daily Yes Eryn Ashley MD   trihexyphenidyl (ARTANE) 2 MG tablet TAKE 1 TABLET BY MOUTH EVERY DAY Yes Eryn Ashley MD   warfarin (COUMADIN) 2 MG tablet Take 1 tab by mouth daily Yes Eryn Ashley MD   rOPINIRole (REQUIP) 0.25 MG tablet Take 1 tablet by mouth daily Yes Historical Provider, MD   trimethoprim (TRIMPEX) 100 MG tablet  Yes Historical Provider, MD   UNABLE TO FIND GSH-3 Cell Defense Yes Historical Provider, Edward Huber, (SAW PALMETTO BERRY PO) Take by mouth Yes Historical Provider, MD   fluticasone-salmeterol (ADVAIR HFA) 230-21 MCG/ACT inhaler Inhale 2 puffs into the lungs 2 times daily Yes Son Kenney MD   famotidine (PEPCID) 20 MG tablet Take 0.5 tablets by mouth 2 times daily Yes [address] [place] [address],[address], DO   MAGNESIUM CHLORIDE PO Take 1 each by mouth daily  Yes Historical Provider, MD   aspirin 81 MG EC tablet Take 1 tablet by mouth daily Yes Son Kenney MD   Multiple Vitamins-Minerals (CENTRUM PO) Take 1 tablet by mouth nightly Over The Counter  Yes Historical Provider, MD   Cholecalciferol (VITAMIN D PO) Take 2,000 Units by mouth 2 times daily Over The Counter  Yes Historical Provider, MD   alirocumab (PRALUENT) 75 MG/ML SOSY injection prefilled syringe Inject 1 mL into the skin every 14 days  Patient not taking: Reported on 4/8/2021  Eryn Ashley MD   levothyroxine (SYNTHROID) 25 MCG tablet Take 1 tablet by mouth Daily  Eryn Ashley MD   gabapentin (NEURONTIN) 100 MG capsule Take 1 capsule by mouth 2 times daily for 90 days. Patient taking differently: Take 100 mg by mouth 3 times daily.    Eryn Ashley hyperlipidemia  Patient wanting to discontinue praluent due to possible side effects. Has follow up with cardiology in a few weeks- advised to discuss with cardiologist first prior to discontinuation. 4.  Prediabetes  HbA1c improving with dietary modifications, now 6.4. Encouraged to continue, will monitor. Patient voiced understanding and agreement with plan. All questions/concerns were addressed, risks/side effects of medications were reviewed. Return precautions and after visit summary were provided. Return in about 3 months (around 7/8/2021). or earlier as needed.       Velasquez Rothman MD

## 2021-04-14 ENCOUNTER — NURSE ONLY (OUTPATIENT)
Dept: INTERNAL MEDICINE CLINIC | Age: 81
End: 2021-04-14
Payer: MEDICARE

## 2021-04-14 VITALS — TEMPERATURE: 97.3 F

## 2021-04-14 DIAGNOSIS — Z79.01 LONG TERM CURRENT USE OF ANTICOAGULANT THERAPY: Primary | ICD-10-CM

## 2021-04-14 LAB
INTERNATIONAL NORMALIZATION RATIO, POC: 1.9
PROTHROMBIN TIME, POC: 23.1

## 2021-04-14 PROCEDURE — 85610 PROTHROMBIN TIME: CPT | Performed by: FAMILY MEDICINE

## 2021-04-14 NOTE — PROGRESS NOTES
Patient came in today to have his INR checked. Patient is taking 2 MG every day but on Monday he takes 1 MG.     4/14/21 Per PCP: Take 2 MG all 7 days of the week. Repeat INR in 2 weeks.

## 2021-04-19 DIAGNOSIS — G25.2 RESTING TREMOR: ICD-10-CM

## 2021-04-20 RX ORDER — TRIHEXYPHENIDYL HYDROCHLORIDE 2 MG/1
TABLET ORAL
Qty: 90 TABLET | Refills: 1 | Status: SHIPPED | OUTPATIENT
Start: 2021-04-20 | End: 2021-10-25

## 2021-04-23 DIAGNOSIS — N18.31 STAGE 3A CHRONIC KIDNEY DISEASE (HCC): ICD-10-CM

## 2021-04-23 DIAGNOSIS — R39.11 BENIGN PROSTATIC HYPERPLASIA WITH URINARY HESITANCY: ICD-10-CM

## 2021-04-23 DIAGNOSIS — N40.1 BENIGN PROSTATIC HYPERPLASIA WITH URINARY HESITANCY: ICD-10-CM

## 2021-04-23 LAB
ALBUMIN SERPL-MCNC: 4.6 G/DL (ref 3.4–5)
ANION GAP SERPL CALCULATED.3IONS-SCNC: 13 MMOL/L (ref 3–16)
BUN BLDV-MCNC: 22 MG/DL (ref 7–20)
CALCIUM SERPL-MCNC: 9.6 MG/DL (ref 8.3–10.6)
CHLORIDE BLD-SCNC: 109 MMOL/L (ref 99–110)
CO2: 25 MMOL/L (ref 21–32)
CREAT SERPL-MCNC: 1.4 MG/DL (ref 0.8–1.3)
CREATININE URINE: 150.6 MG/DL (ref 39–259)
GFR AFRICAN AMERICAN: 59
GFR NON-AFRICAN AMERICAN: 48
GLUCOSE BLD-MCNC: 201 MG/DL (ref 70–99)
PHOSPHORUS: 2.8 MG/DL (ref 2.5–4.9)
POTASSIUM SERPL-SCNC: 4.6 MMOL/L (ref 3.5–5.1)
PROTEIN PROTEIN: 21 MG/DL
PROTEIN/CREAT RATIO: 0.1 MG/DL
SODIUM BLD-SCNC: 147 MMOL/L (ref 136–145)

## 2021-04-28 ENCOUNTER — NURSE ONLY (OUTPATIENT)
Dept: INTERNAL MEDICINE CLINIC | Age: 81
End: 2021-04-28
Payer: MEDICARE

## 2021-04-28 VITALS — TEMPERATURE: 97.1 F

## 2021-04-28 DIAGNOSIS — Z79.01 LONG TERM CURRENT USE OF ANTICOAGULANT THERAPY: ICD-10-CM

## 2021-04-28 LAB
INTERNATIONAL NORMALIZATION RATIO, POC: 3.1
PROTHROMBIN TIME, POC: ABNORMAL

## 2021-04-28 PROCEDURE — 85610 PROTHROMBIN TIME: CPT | Performed by: FAMILY MEDICINE

## 2021-04-28 NOTE — PROGRESS NOTES
Pt taking 2 mg QPM.    Per PCP: 4/28/21- Pt is to take 1mg on Sunday, 2mg all other days. Recheck in 2 weeks. Pt informed and scheduled for 5/12/21 @ 9:30.

## 2021-05-04 ENCOUNTER — OFFICE VISIT (OUTPATIENT)
Dept: CARDIOLOGY CLINIC | Age: 81
End: 2021-05-04
Payer: MEDICARE

## 2021-05-04 VITALS
HEIGHT: 70 IN | DIASTOLIC BLOOD PRESSURE: 72 MMHG | HEART RATE: 70 BPM | BODY MASS INDEX: 30.15 KG/M2 | SYSTOLIC BLOOD PRESSURE: 124 MMHG | WEIGHT: 210.6 LBS

## 2021-05-04 DIAGNOSIS — Z98.890 S/P AV NODAL ABLATION: ICD-10-CM

## 2021-05-04 DIAGNOSIS — I48.91 ATRIAL FIBRILLATION, UNSPECIFIED TYPE (HCC): ICD-10-CM

## 2021-05-04 DIAGNOSIS — Z95.0 CARDIAC PACEMAKER IN SITU: ICD-10-CM

## 2021-05-04 DIAGNOSIS — Z79.01 LONG TERM CURRENT USE OF ANTICOAGULANT THERAPY: ICD-10-CM

## 2021-05-04 DIAGNOSIS — E78.5 HYPERLIPIDEMIA, UNSPECIFIED HYPERLIPIDEMIA TYPE: Primary | ICD-10-CM

## 2021-05-04 DIAGNOSIS — I49.5 SICK SINUS SYNDROME (HCC): ICD-10-CM

## 2021-05-04 PROCEDURE — 1123F ACP DISCUSS/DSCN MKR DOCD: CPT | Performed by: INTERNAL MEDICINE

## 2021-05-04 PROCEDURE — G8427 DOCREV CUR MEDS BY ELIG CLIN: HCPCS | Performed by: INTERNAL MEDICINE

## 2021-05-04 PROCEDURE — 4040F PNEUMOC VAC/ADMIN/RCVD: CPT | Performed by: INTERNAL MEDICINE

## 2021-05-04 PROCEDURE — 99214 OFFICE O/P EST MOD 30 MIN: CPT | Performed by: INTERNAL MEDICINE

## 2021-05-04 PROCEDURE — 4004F PT TOBACCO SCREEN RCVD TLK: CPT | Performed by: INTERNAL MEDICINE

## 2021-05-04 PROCEDURE — G8417 CALC BMI ABV UP PARAM F/U: HCPCS | Performed by: INTERNAL MEDICINE

## 2021-05-04 RX ORDER — ZONISAMIDE 100 MG/1
CAPSULE ORAL
COMMUNITY
Start: 2021-04-14 | End: 2022-11-01 | Stop reason: SDUPTHER

## 2021-05-04 NOTE — PROGRESS NOTES
Eliecer Wray MD                                  CARDIOLOGY  NOTE    Chief Complaint:    Chief Complaint   Patient presents with    6 Month Follow-Up     Patient complains of shortness of breath all the time stated he has copd not getting worse. He denies any chest pain, palpitations, dizziness ,and edema. He stated hes getting botox injection for tremors this week. Pt previously followed with Dr. Anders Saez,  Patient presents for follow-up    Denies any chest pain  No new shortness of breath -chronic dyspnea secondary to COPD  Denies any palpitations    HPI:     Eloise Rivera is a 78y.o. year old male with prior history of chronic atrial fibrillation, s/p AV shannan ablation and permanent pacemaker, COPD, current smoker, hypertension, hyperlipidemia presents for follow-up    Patient denies any chest pain shortness of breath or palpitation    EKG in the office shows ventricular paced rhythm      Current Outpatient Medications   Medication Sig Dispense Refill    zonisamide (ZONEGRAN) 100 MG capsule TAKE 2 CAPSULES BY MOUTH AT BEDTIME      trihexyphenidyl (ARTANE) 2 MG tablet TAKE 1 TABLET BY MOUTH EVERY DAY 90 tablet 1    amLODIPine (NORVASC) 10 MG tablet Take 1 tablet by mouth daily 90 tablet 3    warfarin (COUMADIN) 2 MG tablet Take 1 tab by mouth daily 90 tablet 1    levothyroxine (SYNTHROID) 25 MCG tablet Take 1 tablet by mouth Daily 90 tablet 3    gabapentin (NEURONTIN) 100 MG capsule Take 1 capsule by mouth 2 times daily for 90 days. (Patient taking differently: Take 100 mg by mouth 3 times daily.  ) 180 capsule 2    rOPINIRole (REQUIP) 0.25 MG tablet Take 1 tablet by mouth daily      trimethoprim (TRIMPEX) 100 MG tablet       UNABLE TO FIND GSH-3 Cell Defense      Saw Palmetto, Serenoa repens, (SAW PALMETTO BERRY PO) Take by mouth      fluticasone-salmeterol (ADVAIR HFA) 230-21 MCG/ACT inhaler Inhale 2 puffs into the lungs 2 times daily 3 Inhaler 3    famotidine (PEPCID) 20 MG tablet Take 0.5 tablets by mouth 2 times daily 180 tablet 1    MAGNESIUM CHLORIDE PO Take 1 each by mouth daily       aspirin 81 MG EC tablet Take 1 tablet by mouth daily 30 tablet 3    Multiple Vitamins-Minerals (CENTRUM PO) Take 1 tablet by mouth nightly Over The Counter       Cholecalciferol (VITAMIN D PO) Take 2,000 Units by mouth 2 times daily Over The Counter       alirocumab (PRALUENT) 75 MG/ML SOSY injection prefilled syringe Inject 1 mL into the skin every 14 days (Patient not taking: Reported on 5/4/2021) 6 Syringe 3     No current facility-administered medications for this visit. Allergies: Other, Atorvastatin, Flomax [tamsulosin hcl], and Proscar [finasteride]    Patient History:    Past Medical History:   Diagnosis Date    Acid reflux     Adenomatous polyp of colon 1-2009    last colonoscopy recomm 3 yr follow up    Anticoagulated 2010    takes for afib Dr Nyasia Hernandez monitors INR 1.2 on 02/08/2018    Arthritis     \"arthritis in low back\"    CAD (coronary artery disease)     follow with dr Kolby Julian- cardiac clearance for surgery done 10/31/2017 on chart)    Chronic atrial fibrillation (HCC)     Sees Dr. Barbi Brasher COPD (chronic obstructive pulmonary disease) ([facility] [address]     follows with dr Eamon Urbano Diverticulitis     Diverticulosis of colon     left colon    Enlarged prostate     \"had to ream me out a couple of times\":   [facility] H/O cardiac catheterization 1/31/2006    R codominant, RCA patent, LAD patent with minimal nonobstructive irreg, CIRC patent and codominant, RI small but patent    H/O cardiovascular stress test 5/18/10    EF81% decrease in perfusion in inferoseptal segments c/w paced rhythm, LV small in volume    H/O cardiovascular stress test 5/5/2016    lexiscan-normal,EF70%    H/O cardiovascular stress test 6/2/2016    treadmill    H/O Doppler Carotid ultrasound 11/01/2019    Mild (0-49%) disease of the Bilateral proximal Internal carotid artery.     H/O Doppler ultrasound 06/15/2016    minimal plaque throughout bilaterally    H/O echocardiogram 5/3/16    EF 50-55%. Mild concentric LV hypertrophy with low normal systolic function. Mod bilateral atrial and right ventricle dilatation. Mild AR, MR, TR, [place]. Absence of pericardial effsion.  H/O echocardiogram 8/4/11    EF(not given in faxed records) mild MR, mod LAE, mild AR    H/O echocardiogram 09/18/2019    EF 55-60%,  The left atrium is moderate to severely dilated.  H/O percutaneous left heart catheterization 5/12/16    left circumflex is 60% very distal stenosis at the bifurcation, OM1 is 99% mid segment stenosis    H/O unstable angina     History of Doppler ultrasound 3/29/12    Abd Ao Duplex - no abdominal aoritic aneurysm or iliac aneurysm identified    History of Doppler ultrasound 10/19/10    carotid - 0-19% bilateral ICAs, R and L carotid system show minimal atheromatous diseassse without stenosis    History of Holter monitoring 10/19/10    rhythm is ventricular paced with underlying a-fib    History of nuclear stress test 02/06/2020    Normal study.     Hx of CT scan of chest 8/17/10    mod-severe centrilobular emphysematous changes    HX OTHER MEDICAL     Primary Care Physician Is Dr. Tariq Davalos    Hyperlipidemia     Hypertension     Hypothyroidism due to acquired atrophy of thyroid 2/29/2020    Kidney stones \"Been Having Kidney Stones For 39 Years\"    Passed Kidney Stones Several Times    Nephrolithiasis     with hematuria episodes followed by Dr Bear Magallanes Nocturia     Pacemaker 3-8544    S/P AV ablation    Peptic ulcer, unspecified site, unspecified as acute or chronic, without mention of hemorrhage or perforation 1983    per EGD    Pneumonia Dx 2000's    Shortness of breath     Skin Cancer Arms And Ears In Past    Excision Skin Cancer Arms And Ears    Sleep apnea     No CPAP- had sleep study done 10+ yrs ago    Stented coronary artery 5/12/16    3.0 X 12 MM ELIGIO stent to OM 1    Teeth missing     Lower    Wears dentures     Full Lower    Wears glasses      Past Surgical History:   Procedure Laterality Date   [facility] CARDIAC PACEMAKER PLACEMENT  5-3-99, 1-29-14    Medtronic Sensia SR Pacemaker Implanted    COLONOSCOPY  8-12 - Dr Mitzy Barboza    Polyps Removed In Past    CORONARY ANGIOPLASTY WITH STENT PLACEMENT      per old chart had cath with stent placement done 5/2016    CYSTOSCOPY Left 11/17/2017    cysto, left retrograde, flugeration of prostate    DENTAL SURGERY      Teeth Extracted In Past    ENDOSCOPY, COLON, DIAGNOSTIC  1983    KNEE SURGERY Right 1966    \"shaved the kneecap\"    LITHOTRIPSY  2009, 2014    Kidney Stones    PACEMAKER PLACEMENT  5-3-99    Medtronic [employer] SR Pacemaker Implanted    PACEMAKER PLACEMENT  1-29-14    Medtronic Fargo SR Pacemaker Implanted( had to replace the whole thing in 2014- that was my 2rd one - first one in 34 and next one 2007\"    SKIN CANCER EXCISION  In Past    Skin Cancer Excised Arms And Ears     Family History   Problem Relation Age of Onset    Heart Disease Mother     Cancer Father 76        Leukemia    Heart Disease Father         Atrial Fib    Cancer Sister         Leukemia    Cancer Brother         Leukemia     Social History     Tobacco Use    Smoking status: Current Every Day Smoker     Packs/day: 0.50     Years: 67.00     Pack years: 33.50     Types: Cigarettes     Start date: 8/14/1948    Smokeless tobacco: Never Used   Substance Use Topics    Alcohol use: No     Alcohol/week: 0.0 standard drinks        Review of Systems:     · Constitutional:  No Fever or Weight Loss   · Eyes: No Decreased Vision  · ENT: No Headaches, Hearing Loss or Vertigo  · Cardiovascular: No Chest Pain,  No Shortness of breath, No Palpitations. No Edema   · Respiratory: No cough or wheezing .  No Respiratory distress   · Gastrointestinal: No abdominal pain, appetite loss, blood in stools, constipation, diarrhea or heartburn  · Genitourinary: No dysuria, trouble voiding, or smoking/nicotine dependence: Patient was counseled about smoking cessation       Return in about 6 months (around 11/4/2021). Counseled extensively and medication compliance urged. We discussed that for the  prevention of ASCVD our  goal is aggressive risk modification. Patient is encouraged to exercise even a brisk walk for 30 minutes  at least 3 to 4 times a week   Various goals were discussed and questions answered. Continue current medications. Appropriate prescriptions are addressed and refills ordered. Questions answered and patient verbalizes understanding. Call for any problems, questions, or concerns.

## 2021-05-12 ENCOUNTER — NURSE ONLY (OUTPATIENT)
Dept: INTERNAL MEDICINE CLINIC | Age: 81
End: 2021-05-12
Payer: MEDICARE

## 2021-05-12 VITALS — TEMPERATURE: 97.2 F

## 2021-05-12 DIAGNOSIS — Z79.01 LONG TERM CURRENT USE OF ANTICOAGULANT THERAPY: ICD-10-CM

## 2021-05-12 LAB
INTERNATIONAL NORMALIZATION RATIO, POC: 2.5
PROTHROMBIN TIME, POC: NORMAL

## 2021-05-12 PROCEDURE — 85610 PROTHROMBIN TIME: CPT | Performed by: FAMILY MEDICINE

## 2021-05-12 NOTE — PROGRESS NOTES
Pt is currently taking 1 mg Sunday, 2mg all other days. Per PCP: 5/12/21- Pt is to take 1mg on Sunday, 2mg all other days. Recheck in 6 weeks.

## 2021-06-25 ENCOUNTER — NURSE ONLY (OUTPATIENT)
Dept: INTERNAL MEDICINE CLINIC | Age: 81
End: 2021-06-25
Payer: MEDICARE

## 2021-06-25 DIAGNOSIS — Z79.01 LONG TERM CURRENT USE OF ANTICOAGULANT THERAPY: Primary | ICD-10-CM

## 2021-06-25 LAB
INTERNATIONAL NORMALIZATION RATIO, POC: 1.6
PROTHROMBIN TIME, POC: 18.7

## 2021-06-25 PROCEDURE — 85610 PROTHROMBIN TIME: CPT | Performed by: FAMILY MEDICINE

## 2021-06-25 NOTE — PROGRESS NOTES
Pt came in today for INR. Pt states he is taking 1 mg of warfarin on sundays and 2 the rest of the week. Per PCP: 6/25/21- Pt is to take 2 mg all days of the week. Recheck INR in 2 weeks.

## 2021-07-01 ENCOUNTER — PROCEDURE VISIT (OUTPATIENT)
Dept: CARDIOLOGY CLINIC | Age: 81
End: 2021-07-01
Payer: MEDICARE

## 2021-07-01 VITALS — DIASTOLIC BLOOD PRESSURE: 68 MMHG | SYSTOLIC BLOOD PRESSURE: 128 MMHG | HEART RATE: 68 BPM

## 2021-07-01 DIAGNOSIS — Z95.0 CARDIAC PACEMAKER IN SITU: Primary | ICD-10-CM

## 2021-07-01 PROCEDURE — 93280 PM DEVICE PROGR EVAL DUAL: CPT | Performed by: INTERNAL MEDICINE

## 2021-07-01 NOTE — PROCEDURES
Jefe Kirk  78 y.o., male  1940    Lisa Woodward MD    Chief Complaint   Patient presents with    Procedure     Pacemaker check     Vitals:    07/01/21 1034   BP: 128/68   Pulse: 68     Pacer analysis is reviewed and filed in the pacer chart. Analysis is consistent with normal single-chamber non-MRI Medtronic pacer function with stable RV lead and appropriate battery status for the age of the device. Remaining average battery longevity is 7 months. Pacer is programmed to VVIR mode at a lower rate of 60 bpm and 95% pacing in the ventricle. .    Recommend continued every three month pacer check and follow up office visit as scheduled.     Ronn Quiroga MD, 7/1/2021 11:21 AM

## 2021-07-06 RX ORDER — FAMOTIDINE 20 MG/1
10 TABLET, FILM COATED ORAL 2 TIMES DAILY
Qty: 180 TABLET | Refills: 1 | Status: SHIPPED | OUTPATIENT
Start: 2021-07-06 | End: 2022-07-18

## 2021-07-15 ENCOUNTER — OFFICE VISIT (OUTPATIENT)
Dept: INTERNAL MEDICINE CLINIC | Age: 81
End: 2021-07-15
Payer: MEDICARE

## 2021-07-15 VITALS
DIASTOLIC BLOOD PRESSURE: 72 MMHG | WEIGHT: 208 LBS | SYSTOLIC BLOOD PRESSURE: 116 MMHG | BODY MASS INDEX: 29.78 KG/M2 | HEART RATE: 79 BPM | OXYGEN SATURATION: 95 % | HEIGHT: 70 IN

## 2021-07-15 DIAGNOSIS — Z79.01 LONG TERM CURRENT USE OF ANTICOAGULANT THERAPY: ICD-10-CM

## 2021-07-15 DIAGNOSIS — R73.03 PREDIABETES: ICD-10-CM

## 2021-07-15 DIAGNOSIS — E03.4 HYPOTHYROIDISM DUE TO ACQUIRED ATROPHY OF THYROID: ICD-10-CM

## 2021-07-15 DIAGNOSIS — E78.2 MIXED HYPERLIPIDEMIA: ICD-10-CM

## 2021-07-15 DIAGNOSIS — I15.9 HYPERTENSION, SECONDARY: Primary | ICD-10-CM

## 2021-07-15 LAB
INTERNATIONAL NORMALIZATION RATIO, POC: 1.9
PROTHROMBIN TIME, POC: 22.8

## 2021-07-15 PROCEDURE — 4004F PT TOBACCO SCREEN RCVD TLK: CPT | Performed by: FAMILY MEDICINE

## 2021-07-15 PROCEDURE — 1123F ACP DISCUSS/DSCN MKR DOCD: CPT | Performed by: FAMILY MEDICINE

## 2021-07-15 PROCEDURE — 4040F PNEUMOC VAC/ADMIN/RCVD: CPT | Performed by: FAMILY MEDICINE

## 2021-07-15 PROCEDURE — 85610 PROTHROMBIN TIME: CPT | Performed by: FAMILY MEDICINE

## 2021-07-15 PROCEDURE — G8417 CALC BMI ABV UP PARAM F/U: HCPCS | Performed by: FAMILY MEDICINE

## 2021-07-15 PROCEDURE — G8427 DOCREV CUR MEDS BY ELIG CLIN: HCPCS | Performed by: FAMILY MEDICINE

## 2021-07-15 PROCEDURE — 99213 OFFICE O/P EST LOW 20 MIN: CPT | Performed by: FAMILY MEDICINE

## 2021-07-15 SDOH — ECONOMIC STABILITY: FOOD INSECURITY: WITHIN THE PAST 12 MONTHS, THE FOOD YOU BOUGHT JUST DIDN'T LAST AND YOU DIDN'T HAVE MONEY TO GET MORE.: NEVER TRUE

## 2021-07-15 SDOH — ECONOMIC STABILITY: FOOD INSECURITY: WITHIN THE PAST 12 MONTHS, YOU WORRIED THAT YOUR FOOD WOULD RUN OUT BEFORE YOU GOT MONEY TO BUY MORE.: NEVER TRUE

## 2021-07-15 ASSESSMENT — ENCOUNTER SYMPTOMS
CHEST TIGHTNESS: 0
DIARRHEA: 0
CONSTIPATION: 0
COLOR CHANGE: 0
VOMITING: 0
SORE THROAT: 0
ABDOMINAL PAIN: 0
SHORTNESS OF BREATH: 0

## 2021-07-15 ASSESSMENT — SOCIAL DETERMINANTS OF HEALTH (SDOH): HOW HARD IS IT FOR YOU TO PAY FOR THE VERY BASICS LIKE FOOD, HOUSING, MEDICAL CARE, AND HEATING?: NOT HARD AT ALL

## 2021-07-15 NOTE — PROGRESS NOTES
Subjective:      Chief Complaint   Patient presents with    3 Month Follow-Up       HPI:  Mohan Jose is a 78 y.o. male who presents today for follow up of chronic conditions as listed below. INR 1.9 today. Currently taking 1/2 tab (2mg) coumadin on Mon, full tab the other 6 days of the week. Prediabetes:  States he has been working on diet and has lost some weight. Was referred to dermatology for rash on his leg, but was told they did not know what was causing it. Still not causing any pain/discomfort. Has been seen by cardiology since his last appointment, no changes in management. Feeling well today, no acute concerns. Labs reviewed. Past Medical History:   Diagnosis Date    Acid reflux     Adenomatous polyp of colon 1-2009    last colonoscopy recomm 3 yr follow up    Anticoagulated 2010    takes for afib Dr Lexa Manrique monitors INR 1.2 on 02/08/2018    Arthritis     \"arthritis in low back\"    CAD (coronary artery disease)     follow with dr Paige Aguilera- cardiac clearance for surgery done 10/31/2017 on chart)    Chronic atrial fibrillation (HCC)     Sees Dr. Sheryl Keys COPD (chronic obstructive pulmonary disease) ([facility] [address]     follows with dr Nina Cheek Diverticulitis     Diverticulosis of colon     left colon    Enlarged prostate     \"had to ream me out a couple of times\":   [facility] H/O cardiac catheterization 1/31/2006    R codominant, RCA patent, LAD patent with minimal nonobstructive irreg, CIRC patent and codominant, RI small but patent    H/O cardiovascular stress test 5/18/10    EF81% decrease in perfusion in inferoseptal segments c/w paced rhythm, LV small in volume    H/O cardiovascular stress test 5/5/2016    lexiscan-normal,EF70%    H/O cardiovascular stress test 6/2/2016    treadmill    H/O Doppler Carotid ultrasound 11/01/2019    Mild (0-49%) disease of the Bilateral proximal Internal carotid artery.     H/O Doppler ultrasound 06/15/2016    minimal plaque throughout bilaterally  H/O echocardiogram 5/3/16    EF 50-55%. Mild concentric LV hypertrophy with low normal systolic function. Mod bilateral atrial and right ventricle dilatation. Mild AR, MR, TR, HI. Absence of pericardial effsion.  H/O echocardiogram 8/4/11    EF(not given in faxed records) mild MR, mod LAE, mild AR    H/O echocardiogram 09/18/2019    EF 55-60%,  The left atrium is moderate to severely dilated.  H/O percutaneous left heart catheterization 5/12/16    left circumflex is 60% very distal stenosis at the bifurcation, OM1 is 99% mid segment stenosis    H/O unstable angina     History of Doppler ultrasound 3/29/12    Abd Ao Duplex - no abdominal aoritic aneurysm or iliac aneurysm identified    History of Doppler ultrasound 10/19/10    carotid - 0-19% bilateral ICAs, R and L carotid system show minimal atheromatous diseassse without stenosis    History of Holter monitoring 10/19/10    rhythm is ventricular paced with underlying a-fib    History of nuclear stress test 02/06/2020    Normal study.     Hx of CT scan of chest 8/17/10    mod-severe centrilobular emphysematous changes    HX OTHER MEDICAL     Primary Care Physician Is Dr. Flaquito Briscoe    Hyperlipidemia     Hypertension     Hypothyroidism due to acquired atrophy of thyroid 2/29/2020    Kidney stones \"Been Having Kidney Stones For 39 Years\"    Passed Kidney Stones Several Times    Nephrolithiasis     with hematuria episodes followed by Dr Philip Meza Nocturia     Pacemaker 1-6564    S/P AV ablation    Peptic ulcer, unspecified site, unspecified as acute or chronic, without mention of hemorrhage or perforation 1983    per EGD    Pneumonia Dx 2000's    Shortness of breath     Skin Cancer Arms And Ears In Past    Excision Skin Cancer Arms And Ears    Sleep apnea     No CPAP- had sleep study done 10+ yrs ago    Stented coronary artery 5/12/16    3.0 X 12 MM ELIGIO stent to OM 1    Teeth missing     Lower    Wears dentures     Full Lower  Wears glasses         Past Surgical History:   Procedure Laterality Date   Diana Curl CARDIAC PACEMAKER PLACEMENT  5-3-99, 1-29-14    Medtronic [place] SR Pacemaker Implanted    COLONOSCOPY  8-12 - Dr Noemy Trinidad    Polyps Removed In Past    CORONARY ANGIOPLASTY WITH STENT PLACEMENT      per old chart had cath with stent placement done 5/2016    CYSTOSCOPY Left 11/17/2017    cysto, left retrograde, flugeration of prostate    DENTAL SURGERY      Teeth Extracted In Past    ENDOSCOPY, COLON, DIAGNOSTIC  1983    KNEE SURGERY Right 1966    \"shaved the kneecap\"    LITHOTRIPSY  2009, 2014    Kidney Stones    PACEMAKER PLACEMENT  5-3-99    Medtronic [place] SR Pacemaker Implanted    PACEMAKER PLACEMENT  1-29-14    Medtronic [place] SR Pacemaker Implanted( had to replace the whole thing in 2014- that was my 3rd one - first one in [address] [place] [address] and next one 2007\"    SKIN CANCER EXCISION  In Past    Skin Cancer Excised Arms And Ears       Social History     Tobacco Use    Smoking status: Current Every Day Smoker     Packs/day: 0.50     Years: 67.00     Pack years: 33.50     Types: Cigarettes     Start date: 8/14/1948    Smokeless tobacco: Never Used   Substance Use Topics    Alcohol use: No     Alcohol/week: 0.0 standard drinks        Review of Systems   Constitutional: Negative for activity change, appetite change, chills, fever and unexpected weight change. HENT: Negative for congestion and sore throat. Respiratory: Negative for chest tightness and shortness of breath. Cardiovascular: Negative for chest pain and palpitations. Gastrointestinal: Negative for abdominal pain, constipation, diarrhea and vomiting. Genitourinary: Negative for dysuria. Skin: Negative for color change. Neurological: Negative for dizziness and light-headedness. Psychiatric/Behavioral: Negative for dysphoric mood. The patient is not nervous/anxious. Prior to Visit Medications    Medication Sig Taking?  Authorizing Provider   famotidine (PEPCID) 20 MG tablet Take 0.5 tablets by mouth 2 times daily Yes Tenisha Lewis MD   zonisamide (ZONEGRAN) 100 MG capsule TAKE 2 CAPSULES BY MOUTH AT BEDTIME Yes Historical Provider, MD   trihexyphenidyl (ARTANE) 2 MG tablet TAKE 1 TABLET BY MOUTH EVERY DAY Yes Tenisha Lewis MD   alirocumab (PRALUENT) 75 MG/ML SOSY injection prefilled syringe Inject 1 mL into the skin every 14 days Yes Tenisha Lewis MD   amLODIPine (NORVASC) 10 MG tablet Take 1 tablet by mouth daily Yes Tenisha Lewis MD   warfarin (COUMADIN) 2 MG tablet Take 1 tab by mouth daily Yes Tenisha Lewis MD   levothyroxine (SYNTHROID) 25 MCG tablet Take 1 tablet by mouth Daily Yes Tenisha Lewis MD   gabapentin (NEURONTIN) 100 MG capsule Take 1 capsule by mouth 2 times daily for 90 days. Patient taking differently: Take 100 mg by mouth 3 times daily.   Yes Tenisha Lewis MD   rOPINIRole (REQUIP) 0.25 MG tablet Take 1 tablet by mouth daily Yes Historical Provider, MD   trimethoprim (TRIMPEX) 100 MG tablet  Yes Historical Provider, MD   [place], [place] repens, (SAW PALMETTO BERRY PO) Take by mouth Yes Historical Provider, MD   fluticasone-salmeterol (ADVAIR HFA) 230-21 MCG/ACT inhaler Inhale 2 puffs into the lungs 2 times daily Yes Leno Waterman MD   MAGNESIUM CHLORIDE PO Take 1 each by mouth daily  Yes Historical Provider, MD   aspirin 81 MG EC tablet Take 1 tablet by mouth daily Yes Leno Waterman MD   Multiple Vitamins-Minerals (CENTRUM PO) Take 1 tablet by mouth nightly Over The Counter  Yes Historical Provider, MD   Cholecalciferol (VITAMIN D PO) Take 2,000 Units by mouth 2 times daily Over The Counter  Yes Historical Provider, MD   UNABLE TO FIND GSH-3 Cell Defense  Historical Provider, MD          Objective:      /72 (Site: Right Upper Arm, Position: Sitting, Cuff Size: Medium Adult)   Pulse 79   Ht 5' 10\" (1.778 m)   Wt 208 lb (94.3 kg)   SpO2 95%   BMI 29.84 kg/m² Physical Exam  Vitals and nursing note reviewed. Constitutional:       General: He is not in acute distress. Appearance: Normal appearance. He is not ill-appearing or toxic-appearing. HENT:      Head: Normocephalic and atraumatic. Right Ear: External ear normal.      Left Ear: External ear normal.      Nose: Nose normal.      Mouth/Throat:      Pharynx: Oropharynx is clear. Eyes:      General: No scleral icterus. Right eye: No discharge. Left eye: No discharge. Extraocular Movements: Extraocular movements intact. Conjunctiva/sclera: Conjunctivae normal.   Cardiovascular:      Rate and Rhythm: Normal rate and regular rhythm. Heart sounds: Normal heart sounds. Pulmonary:      Effort: Pulmonary effort is normal.      Breath sounds: Normal breath sounds. No wheezing or rales. Musculoskeletal:         General: No deformity. Cervical back: Normal range of motion and neck supple. No rigidity. Skin:     General: Skin is warm and dry. Findings: No rash. Neurological:      General: No focal deficit present. Mental Status: He is alert. Mental status is at baseline. Motor: No weakness. Psychiatric:         Mood and Affect: Mood normal.         Behavior: Behavior normal.            Assessment / Plan:      1. Hypertension, secondary  Stable, well controlled. Continue current medications. - CBC Auto Differential; Future  - Comprehensive Metabolic Panel; Future    2. Long term current use of anticoagulant therapy  INR subtherapeutic today at 1.9. Start taking coumadin 1 tab (2mg) all 7 days of the week, will repeat in 2 weeks. - POCT INR    3. Prediabetes  Encouraged dietary modifications, will continue to monitor.   - Hemoglobin A1C; Future    4. Mixed hyperlipidemia  Continue praluent. Being followed by cardiology. - Lipid Panel; Future    5. Hypothyroidism due to acquired atrophy of thyroid  Last TSH wnl.   Will monitor labs, continue current dose of synthroid. - TSH without Reflex; Future          I have spent 30 minutes on this patient encounter. Patient voiced understanding and agreement with plan. All questions/concerns were addressed, risks/side effects of medications were reviewed. Return precautions and after visit summary were provided. Return in about 3 months (around 10/15/2021). or earlier as needed.       Angeline Gordon MD

## 2021-07-28 ENCOUNTER — NURSE ONLY (OUTPATIENT)
Dept: INTERNAL MEDICINE CLINIC | Age: 81
End: 2021-07-28
Payer: MEDICARE

## 2021-07-28 DIAGNOSIS — I15.9 HYPERTENSION, SECONDARY: ICD-10-CM

## 2021-07-28 DIAGNOSIS — E78.2 MIXED HYPERLIPIDEMIA: ICD-10-CM

## 2021-07-28 DIAGNOSIS — E03.4 HYPOTHYROIDISM DUE TO ACQUIRED ATROPHY OF THYROID: ICD-10-CM

## 2021-07-28 DIAGNOSIS — N18.31 STAGE 3A CHRONIC KIDNEY DISEASE (HCC): ICD-10-CM

## 2021-07-28 DIAGNOSIS — R73.03 PREDIABETES: ICD-10-CM

## 2021-07-28 DIAGNOSIS — Z79.01 LONG TERM CURRENT USE OF ANTICOAGULANT THERAPY: ICD-10-CM

## 2021-07-28 LAB
INTERNATIONAL NORMALIZATION RATIO, POC: 2.3
PROTHROMBIN TIME, POC: NORMAL

## 2021-07-28 PROCEDURE — 85610 PROTHROMBIN TIME: CPT | Performed by: FAMILY MEDICINE

## 2021-07-28 PROCEDURE — 99211 OFF/OP EST MAY X REQ PHY/QHP: CPT | Performed by: FAMILY MEDICINE

## 2021-08-10 RX ORDER — GABAPENTIN 100 MG/1
100 CAPSULE ORAL 2 TIMES DAILY
Qty: 180 CAPSULE | Refills: 2 | Status: SHIPPED | OUTPATIENT
Start: 2021-08-10 | End: 2022-01-19

## 2021-08-25 ENCOUNTER — NURSE ONLY (OUTPATIENT)
Dept: INTERNAL MEDICINE CLINIC | Age: 81
End: 2021-08-25
Payer: MEDICARE

## 2021-08-25 DIAGNOSIS — Z79.01 LONG TERM CURRENT USE OF ANTICOAGULANT THERAPY: ICD-10-CM

## 2021-08-25 LAB
INTERNATIONAL NORMALIZATION RATIO, POC: 2.2
PROTHROMBIN TIME, POC: NORMAL

## 2021-08-25 PROCEDURE — 85610 PROTHROMBIN TIME: CPT | Performed by: FAMILY MEDICINE

## 2021-10-14 ENCOUNTER — OFFICE VISIT (OUTPATIENT)
Dept: INTERNAL MEDICINE CLINIC | Age: 81
End: 2021-10-14
Payer: MEDICARE

## 2021-10-14 VITALS
HEIGHT: 70 IN | DIASTOLIC BLOOD PRESSURE: 70 MMHG | BODY MASS INDEX: 28.92 KG/M2 | OXYGEN SATURATION: 95 % | SYSTOLIC BLOOD PRESSURE: 108 MMHG | TEMPERATURE: 97.2 F | WEIGHT: 202 LBS | HEART RATE: 68 BPM

## 2021-10-14 DIAGNOSIS — Z79.01 LONG TERM CURRENT USE OF ANTICOAGULANT THERAPY: ICD-10-CM

## 2021-10-14 DIAGNOSIS — E78.2 MIXED HYPERLIPIDEMIA: ICD-10-CM

## 2021-10-14 DIAGNOSIS — I15.9 HYPERTENSION, SECONDARY: Primary | ICD-10-CM

## 2021-10-14 DIAGNOSIS — E03.4 HYPOTHYROIDISM DUE TO ACQUIRED ATROPHY OF THYROID: ICD-10-CM

## 2021-10-14 DIAGNOSIS — Z23 NEEDS FLU SHOT: ICD-10-CM

## 2021-10-14 DIAGNOSIS — R73.03 PREDIABETES: ICD-10-CM

## 2021-10-14 LAB
INTERNATIONAL NORMALIZATION RATIO, POC: 2.3
PROTHROMBIN TIME, POC: 27.3

## 2021-10-14 PROCEDURE — 4004F PT TOBACCO SCREEN RCVD TLK: CPT | Performed by: FAMILY MEDICINE

## 2021-10-14 PROCEDURE — G0008 ADMIN INFLUENZA VIRUS VAC: HCPCS | Performed by: FAMILY MEDICINE

## 2021-10-14 PROCEDURE — G8417 CALC BMI ABV UP PARAM F/U: HCPCS | Performed by: FAMILY MEDICINE

## 2021-10-14 PROCEDURE — 90694 VACC AIIV4 NO PRSRV 0.5ML IM: CPT | Performed by: FAMILY MEDICINE

## 2021-10-14 PROCEDURE — G8427 DOCREV CUR MEDS BY ELIG CLIN: HCPCS | Performed by: FAMILY MEDICINE

## 2021-10-14 PROCEDURE — G8484 FLU IMMUNIZE NO ADMIN: HCPCS | Performed by: FAMILY MEDICINE

## 2021-10-14 PROCEDURE — 1123F ACP DISCUSS/DSCN MKR DOCD: CPT | Performed by: FAMILY MEDICINE

## 2021-10-14 PROCEDURE — 85610 PROTHROMBIN TIME: CPT | Performed by: FAMILY MEDICINE

## 2021-10-14 PROCEDURE — 99214 OFFICE O/P EST MOD 30 MIN: CPT | Performed by: FAMILY MEDICINE

## 2021-10-14 PROCEDURE — 4040F PNEUMOC VAC/ADMIN/RCVD: CPT | Performed by: FAMILY MEDICINE

## 2021-10-14 ASSESSMENT — ENCOUNTER SYMPTOMS
CHEST TIGHTNESS: 0
ABDOMINAL PAIN: 0
DIARRHEA: 0
VOMITING: 0
SHORTNESS OF BREATH: 0
CONSTIPATION: 0
COLOR CHANGE: 0
SORE THROAT: 0

## 2021-10-14 NOTE — PROGRESS NOTES
Subjective:      Chief Complaint   Patient presents with    3 Month Follow-Up    Flu Vaccine       HPI:  Kelvin Quinn is a 78 y.o. male who presents today for follow up of chronic conditions as listed below. HTN:   States he checks BP occasionally at home, are always normal when he checks. Denying any symptoms. States he switched cardiologists and will now be seen at [facility]. Has had testing completed already, including ECHO and stress test - has his first appointment next week. Tremor:  Botox dose was doubled last treatment per dermatology, thinks it may have helped a little. Hypothyroidism:  Denying any symptoms. Prediabetes:  States he has been trying to cut down on soda, drinks 1 at most a day. INR 2.3 today. Feeling well today, no acute concerns. Did not get labs completed.       Past Medical History:   Diagnosis Date    Acid reflux     Adenomatous polyp of colon 1-2009    last colonoscopy recomm 3 yr follow up    Anticoagulated 2010    takes for afib Dr Rolanda Schultz monitors INR 1.2 on 02/08/2018    Arthritis     \"arthritis in low back\"    CAD (coronary artery disease)     follow with dr Freddie Carbo- cardiac clearance for surgery done 10/31/2017 on chart)    Chronic atrial fibrillation (HCC)     Sees Dr. Eda Hernandez COPD (chronic obstructive pulmonary disease) ([facility] [address]     follows with dr Katia Serrano Diverticulitis     Diverticulosis of colon     left colon    Enlarged prostate     \"had to ream me out a couple of times\":   [facility] H/O cardiac catheterization 1/31/2006    R codominant, RCA patent, LAD patent with minimal nonobstructive irreg, CIRC patent and codominant, RI small but patent    H/O cardiovascular stress test 5/18/10    EF81% decrease in perfusion in inferoseptal segments c/w paced rhythm, LV small in volume    H/O cardiovascular stress test 5/5/2016    lexiscan-normal,EF70%    H/O cardiovascular stress test 6/2/2016    treadmill    H/O Doppler Carotid ultrasound 11/01/2019    Mild (0-49%) disease of the Bilateral proximal Internal carotid artery.  H/O Doppler ultrasound 06/15/2016    minimal plaque throughout bilaterally    H/O echocardiogram 5/3/16    EF 50-55%. Mild concentric LV hypertrophy with low normal systolic function. Mod bilateral atrial and right ventricle dilatation. Mild AR, MR, TR, VT. Absence of pericardial effsion.  H/O echocardiogram 8/4/11    EF(not given in faxed records) mild MR, mod LAE, mild AR    H/O echocardiogram 09/18/2019    EF 55-60%,  The left atrium is moderate to severely dilated.  H/O percutaneous left heart catheterization 5/12/16    left circumflex is 60% very distal stenosis at the bifurcation, OM1 is 99% mid segment stenosis    H/O unstable angina     History of Doppler ultrasound 3/29/12    Abd Ao Duplex - no abdominal aoritic aneurysm or iliac aneurysm identified    History of Doppler ultrasound 10/19/10    carotid - 0-19% bilateral ICAs, R and L carotid system show minimal atheromatous diseassse without stenosis    History of Holter monitoring 10/19/10    rhythm is ventricular paced with underlying a-fib    History of nuclear stress test 02/06/2020    Normal study.     Hx of CT scan of chest 8/17/10    mod-severe centrilobular emphysematous changes    HX OTHER MEDICAL     Primary Care Physician Is Dr. Ac Marroquin    Hyperlipidemia     Hypertension     Hypothyroidism due to acquired atrophy of thyroid 2/29/2020    Kidney stones \"Been Having Kidney Stones For 39 Years\"    Passed Kidney Stones Several Times    Nephrolithiasis     with hematuria episodes followed by Dr Teresita Stauffer Nocturia     Pacemaker 7-6364    S/P AV ablation    Peptic ulcer, unspecified site, unspecified as acute or chronic, without mention of hemorrhage or perforation 1983    per EGD    Pneumonia Dx 2000's    Shortness of breath     Skin Cancer Arms And Ears In Past    Excision Skin Cancer Arms And Ears    Sleep apnea No CPAP- had sleep study done 10+ yrs ago    Stented coronary artery 5/12/16    3.0 X 12 MM ELIGIO stent to OM 1    Teeth missing     Lower    Wears dentures     Full Lower    Wears glasses         Past Surgical History:   Procedure Laterality Date   Courtenay Spurling CARDIAC PACEMAKER PLACEMENT  5-3-99, 1-29-14    Medtronic [place] SR Pacemaker Implanted    COLONOSCOPY  8-12 - Dr Payton Chavarria    Polyps Removed In Past    CORONARY ANGIOPLASTY WITH STENT PLACEMENT      per old chart had cath with stent placement done 5/2016    CYSTOSCOPY Left 11/17/2017    cysto, left retrograde, flugeration of prostate    DENTAL SURGERY      Teeth Extracted In Past    ENDOSCOPY, COLON, DIAGNOSTIC  1983    KNEE SURGERY Right 1966    \"shaved the kneecap\"    LITHOTRIPSY  2009, 2014    Kidney Stones    PACEMAKER PLACEMENT  5-3-99    Medtronic [place] SR Pacemaker Implanted    PACEMAKER PLACEMENT  1-29-14    Medtronic [place] SR Pacemaker Implanted( had to replace the whole thing in 2014- that was my 3rd one - first one in [address] [place] [address] and next one 2007\"    SKIN CANCER EXCISION  In Past    Skin Cancer Excised Arms And Ears       Social History     Tobacco Use    Smoking status: Current Every Day Smoker     Packs/day: 0.50     Years: 67.00     Pack years: 33.50     Types: Cigarettes     Start date: 8/14/1948    Smokeless tobacco: Never Used   Substance Use Topics    Alcohol use: No     Alcohol/week: 0.0 standard drinks        Review of Systems   Constitutional: Negative for activity change, appetite change, chills, fever and unexpected weight change. HENT: Negative for congestion and sore throat. Respiratory: Negative for chest tightness and shortness of breath. Cardiovascular: Negative for chest pain and palpitations. Gastrointestinal: Negative for abdominal pain, constipation, diarrhea and vomiting. Genitourinary: Negative for dysuria. Skin: Negative for color change. Neurological: Negative for dizziness and light-headedness. Psychiatric/Behavioral: Negative for dysphoric mood. The patient is not nervous/anxious. Prior to Visit Medications    Medication Sig Taking? Authorizing Provider   gabapentin (NEURONTIN) 100 MG capsule Take 1 capsule by mouth 2 times daily for 90 days.  Yes Atul Eldridge MD   famotidine (PEPCID) 20 MG tablet Take 0.5 tablets by mouth 2 times daily Yes Atul Eldridge MD   zonisamide (ZONEGRAN) 100 MG capsule TAKE 2 CAPSULES BY MOUTH AT BEDTIME Yes Historical Provider, MD   trihexyphenidyl (ARTANE) 2 MG tablet TAKE 1 TABLET BY MOUTH EVERY DAY Yes Atul Eldridge MD   alirocumab (PRALUENT) 75 MG/ML SOSY injection prefilled syringe Inject 1 mL into the skin every 14 days Yes Atul Eldridge MD   amLODIPine (NORVASC) 10 MG tablet Take 1 tablet by mouth daily Yes Atul Eldridge MD   warfarin (COUMADIN) 2 MG tablet Take 1 tab by mouth daily Yes Atul Eldridge MD   levothyroxine (SYNTHROID) 25 MCG tablet Take 1 tablet by mouth Daily Yes Atul Eldridge MD   rOPINIRole (REQUIP) 0.25 MG tablet Take 1 tablet by mouth daily Yes Historical Provider, MD   trimethoprim (TRIMPEX) 100 MG tablet  Yes Historical Provider, MD   UNABLE TO FIND GSH-3 Cell Defense Yes Historical Provider, Edward Huber, (SAW PALMETTO BERRY PO) Take by mouth Yes Historical Provider, MD   fluticasone-salmeterol (ADVAIR HFA) 230-21 MCG/ACT inhaler Inhale 2 puffs into the lungs 2 times daily Yes Mg Sethi MD   MAGNESIUM CHLORIDE PO Take 1 each by mouth daily  Yes Historical Provider, MD   aspirin 81 MG EC tablet Take 1 tablet by mouth daily Yes Mg Sethi MD   Multiple Vitamins-Minerals (CENTRUM PO) Take 1 tablet by mouth nightly Over The Counter  Yes Historical Provider, MD   Cholecalciferol (VITAMIN D PO) Take 2,000 Units by mouth 2 times daily Over The Counter  Yes Historical Provider, MD          Objective:      /70   Pulse 68   Temp 97.2 °F (36.2 °C)   Ht 5' 10\" (1.778 m)   Wt 202 lb (91.6 kg)   SpO2 95%   BMI 28.98 kg/m²      Physical Exam  Vitals and nursing note reviewed. Constitutional:       General: He is not in acute distress. Appearance: Normal appearance. He is not ill-appearing or toxic-appearing. HENT:      Head: Normocephalic and atraumatic. Right Ear: External ear normal.      Left Ear: External ear normal.      Nose: Nose normal.      Mouth/Throat:      Pharynx: Oropharynx is clear. Eyes:      General: No scleral icterus. Right eye: No discharge. Left eye: No discharge. Extraocular Movements: Extraocular movements intact. Conjunctiva/sclera: Conjunctivae normal.   Cardiovascular:      Rate and Rhythm: Normal rate and regular rhythm. Heart sounds: Normal heart sounds. Pulmonary:      Effort: Pulmonary effort is normal.      Breath sounds: Normal breath sounds. No wheezing or rales. Musculoskeletal:         General: No deformity. Cervical back: Normal range of motion and neck supple. No rigidity. Skin:     General: Skin is warm and dry. Findings: No rash. Neurological:      General: No focal deficit present. Mental Status: He is alert. Mental status is at baseline. Motor: No weakness. Psychiatric:         Mood and Affect: Mood normal.         Behavior: Behavior normal.            Assessment / Plan:      1. Hypertension, secondary  Stable, well controlled. Continue current medications. - CBC Auto Differential; Future  - Comprehensive Metabolic Panel; Future    2. Long term current use of anticoagulant therapy  INR therapeutic today. Continue current dose of coumadin. Repeat INR in 6 weeks. - POCT INR    3. Mixed hyperlipidemia  Continue praluent. - Lipid Panel; Future    4. Prediabetes  Encouraged dietary modifications, especially cutting down on soda. Will continue to monitor.   - Hemoglobin A1C; Future    5.  Hypothyroidism due to acquired atrophy of thyroid  Last TSH wnl.  Will monitor labs, continue current dose of synthroid. - TSH without Reflex; Future    6. Needs flu shot  Administered today. - INFLUENZA, QUADV, ADJUVANTED, 65 YRS =, IM, PF, PREFILL SYR, 0.5ML (FLUAD)          I have spent 35 minutes on this patient encounter. Patient voiced understanding and agreement with plan. All questions/concerns were addressed, risks/side effects of medications were reviewed. Return precautions and after visit summary were provided. Return in about 3 months (around 1/14/2022). or earlier as needed.       Vi Agarwal MD

## 2021-10-22 DIAGNOSIS — G25.2 RESTING TREMOR: ICD-10-CM

## 2021-10-25 RX ORDER — TRIHEXYPHENIDYL HYDROCHLORIDE 2 MG/1
TABLET ORAL
Qty: 90 TABLET | Refills: 1 | Status: SHIPPED | OUTPATIENT
Start: 2021-10-25 | End: 2022-02-17

## 2021-10-25 NOTE — PROGRESS NOTES
10/25/21    Colin Washingtonnt  1940    Chief Complaint   Patient presents with    Follow-up     Tremors- botox helped       History of Present Illness  Saima Canales is a 78 y.o. male presenting today for follow-up of:  RLE tremor/myoclonus. Saima Canales remains on ropinirole 0.25 mg taking 0.5 tab at HS, he is unsure why he is taking this dose. He is also taking zonisamide 200 mg HS and gabapentin 100 mg BID. He is currently receiving Botox injections for myoclonus, he is due to inject again in November. We discussed maximizing medication dosing if Botox was not successful, including introducing primidone again at a higher dose. We also discussed a possible MRI in the future of lumbar area to r/o spinal myoclonus. He tells me today his myoclonus is improving. He is not having as many spastic jerking but is still having the tremor. He continues to report cessation of tremor/myoclonus with elevation of his L LE    Saima Canales tells me that he has had a couple falls in the past, nothing since last visit. He does feel at times that his balance is affected. He has never done any balance therapy. Current Outpatient Medications   Medication Sig Dispense Refill    trihexyphenidyl (ARTANE) 2 MG tablet TAKE 1 TABLET BY MOUTH EVERY DAY 90 tablet 1    fluticasone-salmeterol (ADVAIR HFA) 230-21 MCG/ACT inhaler Inhale 2 puffs into the lungs 2 times daily 3 each 3    gabapentin (NEURONTIN) 100 MG capsule Take 1 capsule by mouth 2 times daily for 90 days.  180 capsule 2    famotidine (PEPCID) 20 MG tablet Take 0.5 tablets by mouth 2 times daily 180 tablet 1    zonisamide (ZONEGRAN) 100 MG capsule TAKE 2 CAPSULES BY MOUTH AT BEDTIME      alirocumab (PRALUENT) 75 MG/ML SOSY injection prefilled syringe Inject 1 mL into the skin every 14 days 6 Syringe 3    amLODIPine (NORVASC) 10 MG tablet Take 1 tablet by mouth daily 90 tablet 3    warfarin (COUMADIN) 2 MG tablet Take 1 tab by mouth daily 90 tablet 1    levothyroxine (SYNTHROID) 25 MCG tablet Take 1 tablet by mouth Daily 90 tablet 3    rOPINIRole (REQUIP) 0.25 MG tablet Take 1 tablet by mouth daily      trimethoprim (TRIMPEX) 100 MG tablet       UNABLE TO FIND GSH-3 Cell Defense      Saw [place]met[place], Serenoa repens, (SAW PALMETTO BERRY PO) Take by mouth      MAGNESIUM CHLORIDE PO Take 1 each by mouth daily       aspirin 81 MG EC tablet Take 1 tablet by mouth daily 30 tablet 3    Multiple Vitamins-Minerals (CENTRUM PO) Take 1 tablet by mouth nightly Over The Counter       Cholecalciferol (VITAMIN D PO) Take 2,000 Units by mouth 2 times daily Over The Counter        No current facility-administered medications for this visit. Physical Exam:  Also present during visit: spouse. Mental Status   Orientation: oriented to person, oriented to place, oriented to problem and oriented to time    Mood/affectappropriate mood and appropriate affect   Memory/Other: recent memory intact, remote memory intact, fund of knowledge intact, attention span normal and concentration normal  Language  Language: (normal) language, no dysarthria, (normal) articulation and no dysphasia/aphasia  Cranial Nerves   Eyes: pupils normal size and reactive to light and visual fields appear full   CN III, IV, VI : extraocular muscle strength normal, normal pursuit, no nystagmus and no ptosis   Facial Motor: normal facial motor   CN XII: tongue protrudes midline  Motor/Coordination Exam   Power: motor strength appears intact throughout, no arm drift and normal tone   Coordination: normal finger-to-nose, forearm rotation intact and rapid alternating movement normal  Sensory Exam No Bradykinesia, No Dyskindesia, Normal Tone Normal bulk and Normal tone mild weakness in left foot with push. Moderate myoclonus with moderate tremor L LE.      Gait and Stance   Gait/Posture: station normal, casual gait normal, ambulates independently , steady in Romberg's position with eyes open and closed, tiptoe abnormal and [address] was unable to do heel toe walking as he felt unsteady        /78 (Site: Right Upper Arm, Position: Sitting, Cuff Size: Medium Adult)   Pulse 70   Resp 18   Ht 5' 10\" (1.778 m)   Wt 202 lb (91.6 kg)   SpO2 94%   BMI 28.98 kg/m²     Assessment and Plan     Diagnosis Orders   1. Tremor     2. Myoclonus     3. Anxiety     4. Atrial fibrillation, unspecified type ([address]     5. Cardiac pacemaker in situ     6. Hypertension, unspecified type     7. TASIA (obstructive sleep apnea)     8. Smoker       Mona Rodriguez will have his next Botox injection in November with Dr. Sherrill Epley. If he continues to show improvement with myoclonus he would like to continue Botox injections eventually at the [facility] office with Dr. Pee Pitts. I had originally anticipated doing some medication changes on today's visit but since Mona Rodriguez reported having improvement with last Botox injection I will hold off on making any medication changes at this time. I will however have voice begin taking a full 1 mg of ropinirole at bedtime as prescribed. I did ask that special attention be given to Devyn's response to taking the full 1 mg. If it appears to make his symptoms worse, he will go back down to taking half dose. I have asked that they notify me with his response. I will follow up with Mona Rodriguez again in 3 months, at that time we will again consider medication changes depending on how he responds to this next dose of Botox. We will order balance therapy on next visit. Medications prescribed for the patient were discussed in detail. This included a discussion of the potential risks versus potential benefits of the medications. The patient was given time to ask questions and these were answered to the best of my ability. The patient appeared to understand the information provided. Return in about 3 months (around 1/26/2022).     ORVILLE Mendes Mai - NP

## 2021-10-26 ENCOUNTER — OFFICE VISIT (OUTPATIENT)
Dept: NEUROLOGY | Age: 81
End: 2021-10-26
Payer: MEDICARE

## 2021-10-26 VITALS
SYSTOLIC BLOOD PRESSURE: 128 MMHG | BODY MASS INDEX: 28.92 KG/M2 | HEIGHT: 70 IN | OXYGEN SATURATION: 94 % | DIASTOLIC BLOOD PRESSURE: 78 MMHG | HEART RATE: 70 BPM | WEIGHT: 202 LBS | RESPIRATION RATE: 18 BRPM

## 2021-10-26 DIAGNOSIS — G25.3 MYOCLONUS: ICD-10-CM

## 2021-10-26 DIAGNOSIS — F17.200 SMOKER: ICD-10-CM

## 2021-10-26 DIAGNOSIS — I48.91 ATRIAL FIBRILLATION, UNSPECIFIED TYPE (HCC): ICD-10-CM

## 2021-10-26 DIAGNOSIS — F41.9 ANXIETY: ICD-10-CM

## 2021-10-26 DIAGNOSIS — Z95.0 CARDIAC PACEMAKER IN SITU: ICD-10-CM

## 2021-10-26 DIAGNOSIS — I10 HYPERTENSION, UNSPECIFIED TYPE: ICD-10-CM

## 2021-10-26 DIAGNOSIS — G47.33 OSA (OBSTRUCTIVE SLEEP APNEA): ICD-10-CM

## 2021-10-26 DIAGNOSIS — R25.1 TREMOR: Primary | ICD-10-CM

## 2021-10-26 PROCEDURE — 99214 OFFICE O/P EST MOD 30 MIN: CPT | Performed by: NURSE PRACTITIONER

## 2021-10-26 RX ORDER — ONABOTULINUMTOXINA 100 [USP'U]/1
INJECTION, POWDER, LYOPHILIZED, FOR SOLUTION INTRADERMAL; INTRAMUSCULAR
COMMUNITY
End: 2022-04-21

## 2021-10-26 RX ORDER — METOPROLOL SUCCINATE 25 MG/1
TABLET, EXTENDED RELEASE ORAL
COMMUNITY
Start: 2021-10-18

## 2021-11-09 ENCOUNTER — TELEPHONE (OUTPATIENT)
Dept: INTERNAL MEDICINE CLINIC | Age: 81
End: 2021-11-09

## 2021-11-09 RX ORDER — ALIROCUMAB 75 MG/ML
75 INJECTION, SOLUTION SUBCUTANEOUS
Qty: 2.24 ML | Refills: 5 | Status: SHIPPED | OUTPATIENT
Start: 2021-11-09 | End: 2021-12-16 | Stop reason: SDUPTHER

## 2021-11-17 ENCOUNTER — NURSE ONLY (OUTPATIENT)
Dept: INTERNAL MEDICINE CLINIC | Age: 81
End: 2021-11-17
Payer: MEDICARE

## 2021-11-17 VITALS — TEMPERATURE: 97.3 F

## 2021-11-17 DIAGNOSIS — Z79.01 LONG TERM CURRENT USE OF ANTICOAGULANT THERAPY: ICD-10-CM

## 2021-11-17 LAB
INTERNATIONAL NORMALIZATION RATIO, POC: 2.6
PROTHROMBIN TIME, POC: 31.4

## 2021-11-17 PROCEDURE — 85610 PROTHROMBIN TIME: CPT | Performed by: FAMILY MEDICINE

## 2021-11-17 NOTE — PROGRESS NOTES
Pt came in today for INR check. Pt states he is taking 2 mg everyday of the week. Per PCP: 11/17/21- Pt is to take 2 mg all days of the week. Recheck INR in 6 weeks.

## 2021-12-09 ENCOUNTER — HOSPITAL ENCOUNTER (OUTPATIENT)
Dept: LAB | Age: 81
Discharge: HOME | End: 2021-12-09
Payer: MEDICARE

## 2021-12-09 ENCOUNTER — ANESTHESIA EVENT (OUTPATIENT)
Dept: OPERATING ROOM | Age: 81
End: 2021-12-09
Payer: MEDICARE

## 2021-12-09 PROCEDURE — U0005 INFEC AGEN DETEC AMPLI PROBE: HCPCS

## 2021-12-09 PROCEDURE — U0003 INFECTIOUS AGENT DETECTION BY NUCLEIC ACID (DNA OR RNA); SEVERE ACUTE RESPIRATORY SYNDROME CORONAVIRUS 2 (SARS-COV-2) (CORONAVIRUS DISEASE [COVID-19]), AMPLIFIED PROBE TECHNIQUE, MAKING USE OF HIGH THROUGHPUT TECHNOLOGIES AS DESCRIBED BY CMS-2020-01-R: HCPCS

## 2021-12-09 ASSESSMENT — ENCOUNTER SYMPTOMS: SHORTNESS OF BREATH: 1

## 2021-12-09 NOTE — ANESTHESIA PRE PROCEDURE
Department of Anesthesiology  Preprocedure Note       Name:  Machelle Lima   Age:  78 y.o.  :  1940                                          MRN:  [id number]         Date:  2021      Surgeon: Malena Karimi):  Brad Del Rosario MD    Procedure: Procedure(s):  PACEMAKER GENERATOR CHANGE    Medications prior to admission:   Prior to Admission medications    Medication Sig Start Date End Date Taking? Authorizing Provider   rOPINIRole (REQUIP) 0.25 MG tablet Take 1 tablet by mouth daily  Patient taking differently: Take 0.25 mg by mouth nightly  21   Jone Chandra MD   alirocumab (PRALUENT) 75 MG/ML SOAJ injection pen Inject 1 mL into the skin every 14 days 21   Kelin Chase MD   metoprolol succinate (TOPROL XL) 25 MG extended release tablet  10/18/21   Historical Provider, MD   onabotulinumtoxin A (BOTOX) 100 units injection Botox 100 unit injection   Take 100 units by injection route. Historical Provider, MD   trihexyphenidyl (ARTANE) 2 MG tablet TAKE 1 TABLET BY MOUTH EVERY DAY 10/25/21   Kelin Chase MD   fluticasone-salmeterol ([facility]) 544-57 MCG/ACT inhaler Inhale 2 puffs into the lungs 2 times daily 10/19/21   Kelin Chase MD   gabapentin (NEURONTIN) 100 MG capsule Take 1 capsule by mouth 2 times daily for 90 days.  8/10/21 11/8/21  Kelin Chase MD   famotidine (PEPCID) 20 MG tablet Take 0.5 tablets by mouth 2 times daily 21   Kelin Chase MD   zonisamide (ZONEGRAN) 100 MG capsule TAKE 2 CAPSULES BY MOUTH AT BEDTIME 21   Historical Provider, MD   amLODIPine (NORVASC) 10 MG tablet Take 1 tablet by mouth daily  Patient taking differently: Take 10 mg by mouth nightly  20   Kelin Chase MD   warfarin (COUMADIN) 2 MG tablet Take 1 tab by mouth daily 10/6/20   Kelin Chase MD   levothyroxine (SYNTHROID) 25 MCG tablet Take 1 tablet by mouth Daily 10/5/20 10/14/21  Kelin Chase MD   trimethoprim (TRIMPEX) 100 MG tablet  4/23/20   Historical Provider, MD   [address] Provider, MD   Saw [place], Serenoa repens, (SAW PALMETTO BERRY PO) Take by mouth    Historical Provider, MD   MAGNESIUM CHLORIDE PO Take 1 each by mouth daily     Historical Provider, MD   aspirin 81 MG EC tablet Take 1 tablet by mouth daily 5/13/16   [facility], MD   Multiple Vitamins-Minerals (CENTRUM PO) Take 1 tablet by mouth nightly Over The Counter     Historical Provider, MD   Cholecalciferol (VITAMIN D PO) Take 2,000 Units by mouth 2 times daily Over The Counter     Historical Provider, MD       Current medications:    No current facility-administered medications for this encounter. Current Outpatient Medications   Medication Sig Dispense Refill    rOPINIRole (REQUIP) 0.25 MG tablet Take 1 tablet by mouth daily (Patient taking differently: Take 0.25 mg by mouth nightly ) 90 tablet 3    alirocumab (PRALUENT) 75 MG/ML SOAJ injection pen Inject 1 mL into the skin every 14 days 2.24 mL 5    metoprolol succinate (TOPROL XL) 25 MG extended release tablet       onabotulinumtoxin A (BOTOX) 100 units injection Botox 100 unit injection   Take 100 units by injection route.  trihexyphenidyl (ARTANE) 2 MG tablet TAKE 1 TABLET BY MOUTH EVERY DAY 90 tablet 1    fluticasone-salmeterol (ADVAIR HFA) 230-21 MCG/ACT inhaler Inhale 2 puffs into the lungs 2 times daily 3 each 3    gabapentin (NEURONTIN) 100 MG capsule Take 1 capsule by mouth 2 times daily for 90 days.  180 capsule 2    famotidine (PEPCID) 20 MG tablet Take 0.5 tablets by mouth 2 times daily 180 tablet 1    zonisamide (ZONEGRAN) 100 MG capsule TAKE 2 CAPSULES BY MOUTH AT BEDTIME      amLODIPine (NORVASC) 10 MG tablet Take 1 tablet by mouth daily (Patient taking differently: Take 10 mg by mouth nightly ) 90 tablet 3    warfarin (COUMADIN) 2 MG tablet Take 1 tab by mouth daily 90 tablet 1    levothyroxine (SYNTHROID) 25 MCG tablet Take 1 tablet by mouth Daily 90 tablet 3    trimethoprim (TRIMPEX) 100 MG tablet       UNABLE TO FIND GSH-3 Cell Defense      Saw Palmetto, Serenoa repens, (SAW PALMETTO BERRY PO) Take by mouth      MAGNESIUM CHLORIDE PO Take 1 each by mouth daily       aspirin 81 MG EC tablet Take 1 tablet by mouth daily 30 tablet 3    Multiple Vitamins-Minerals (CENTRUM PO) Take 1 tablet by mouth nightly Over The Counter       Cholecalciferol (VITAMIN D PO) Take 2,000 Units by mouth 2 times daily Over The Counter          Allergies:     Allergies   Allergen Reactions    Other      \"Allergic To All Cholesterol Medications Causing Muscles To Ache\"    Atorvastatin      Patient states he is allergic to all cholesterol medications    Flomax [Tamsulosin Hcl] Other (See Comments)     Headache    Proscar [Finasteride] Other (See Comments)     Headache       Problem List:    Patient Active Problem List   Diagnosis Code    [facility] I48.91    Hyperlipidemia E78.5    Vitamin D deficiency E55.9    Adenomatous colon polyp D12.6    Sleep apnea G47.30    Chronic atrial fibrillation (HCC) I48.20    Cardiac pacemaker in situ Z95.0    S/P AV shannan ablation-done 4-1999 Z98.890    Long term current use of anticoagulant therapy Z79.01    Unstable angina (HCC) I20.0    Moderate COPD (chronic obstructive pulmonary disease) (HCC) J44.9    Calculus of kidney N20.0    Enlarged prostate with lower urinary tract symptoms (LUTS) N40.1    Multiple cerebral infarctions (HCC) I63.9    Low back pain M54.50    Resting tremor G25.2    Tension type headache G44.209    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 M11.262    Primary osteoarthritis of left knee M17.12    Chronic insomnia F51.04    Hypothyroidism due to acquired atrophy of thyroid E03.4    Gastroesophageal reflux disease without esophagitis K21.9    Stage 3a chronic kidney disease (HCC) N18.31    Hypertension, secondary I15.9    Prediabetes R73.03    Sick sinus syndrome (HCC) I49.5       Past Medical History:        Diagnosis Date    Acid reflux     Adenomatous polyp of colon 1-2009    last colonoscopy recomm 3 yr follow up    Anticoagulated 2010    takes for afib Dr Tre Rahman monitors INR 1.2 on 02/08/2018    Arthritis     \"arthritis in low back\"    CAD (coronary artery disease)     follow with dr Atilio Easton- cardiac clearance for surgery done 10/31/2017 on chart)    Chronic atrial fibrillation (HCC)     Sees Dr. Jose Lam COPD (chronic obstructive pulmonary disease) ([facility]     follows with dr Namrata Mortensen Diverticulitis     Diverticulosis of colon     left colon    Enlarged prostate     \"had to ream me out a couple of times\":   [employer] H/O cardiac catheterization 1/31/2006    R codominant, RCA patent, LAD patent with minimal nonobstructive irreg, CIRC patent and codominant, RI small but patent    H/O cardiovascular stress test 5/18/10    EF81% decrease in perfusion in inferoseptal segments c/w paced rhythm, LV small in volume    H/O cardiovascular stress test 5/5/2016    lexiscan-normal,EF70%    H/O cardiovascular stress test 6/2/2016    treadmill    H/O Doppler Carotid ultrasound 11/01/2019    Mild (0-49%) disease of the Bilateral proximal Internal carotid artery.  H/O Doppler ultrasound 06/15/2016    minimal plaque throughout bilaterally    H/O echocardiogram 5/3/16    EF 50-55%. Mild concentric LV hypertrophy with low normal systolic function. Mod bilateral atrial and right ventricle dilatation. Mild AR, MR, TR, CA. Absence of pericardial effsion.  H/O echocardiogram 8/4/11    EF(not given in faxed records) mild MR, mod LAE, mild AR    H/O echocardiogram 09/18/2019    EF 55-60%,  The left atrium is moderate to severely dilated.     H/O percutaneous left heart catheterization 5/12/16    left circumflex is 60% very distal stenosis at the bifurcation, OM1 is 99% mid segment stenosis    H/O unstable angina     History of Doppler ultrasound 3/29/12    Abd Ao Duplex - no abdominal aoritic aneurysm or iliac aneurysm identified    History of Doppler ultrasound 10/19/10    carotid - 0-19% bilateral ICAs, R and L carotid system show minimal atheromatous diseassse without stenosis    History of Holter monitoring 10/19/10    rhythm is ventricular paced with underlying a-fib    History of nuclear stress test 02/06/2020    Normal study.     Hx of CT scan of chest 8/17/10    mod-severe centrilobular emphysematous changes    HX OTHER MEDICAL     Primary Care Physician Is Dr. Osorio Carpenter    Hyperlipidemia     Hypertension     Hypothyroidism due to acquired atrophy of thyroid 2/29/2020    Kidney stones \"Been Having Kidney Stones For 39 Years\"    Passed Kidney Stones Several Times    Nephrolithiasis     with hematuria episodes followed by Dr Korey Muniz Nocturia     Pacemaker [id number]    S/P AV ablation    Peptic ulcer, unspecified site, unspecified as acute or chronic, without mention of hemorrhage or perforation 1983    per EGD    Pneumonia Dx 2000's    Shortness of breath     Skin Cancer Arms And Ears In Past    Excision Skin Cancer Arms And Ears    Sleep apnea     No CPAP- had sleep study done 10+ yrs ago    Stented coronary artery 5/12/16    3.0 X 12 MM ELIGIO stent to OM 1    Teeth missing     Lower    Wears dentures     Full Lower    Wears glasses        Past Surgical History:        Procedure Laterality Date    CARDIAC PACEMAKER PLACEMENT  5-3-99, 1-29-14    Medtronic Sensia SR Pacemaker Implanted    COLONOSCOPY  8-12 - Dr Monica Renteria    Polyps Removed In Past    CORONARY ANGIOPLASTY WITH STENT PLACEMENT      per old chart had cath with stent placement done 5/2016    CYSTOSCOPY Left 11/17/2017    cysto, left retrograde, flugeration of prostate    DENTAL SURGERY      Teeth Extracted In Past    ENDOSCOPY, COLON, DIAGNOSTIC  1983    KNEE SURGERY Right 1966    \"shaved the kneecap\"    LITHOTRIPSY  2009, 2014    Kidney Stones    PACEMAKER PLACEMENT  5-3-99    Medtronic [place] SR Pacemaker Implanted    PACEMAKER PLACEMENT  1-29-14    Medtronic [place] SR Pacemaker Implanted( had to replace the whole thing in 2014- that was my 3rd one - first one in [address] [place] [address] and next one 2007\"    SKIN CANCER EXCISION  In Past    Skin Cancer Excised Arms And Ears       Social History:    Social History     Tobacco Use    Smoking status: Current Every Day Smoker     Packs/day: 0.50     Years: 67.00     Pack years: 33.50     Types: Cigarettes     Start date: 8/14/1948    Smokeless tobacco: Never Used   Substance Use Topics    Alcohol use: No     Alcohol/week: 0.0 standard drinks                                Ready to quit: Not Answered  Counseling given: Not Answered      Vital Signs (Current): There were no vitals filed for this visit.                                            BP Readings from Last 3 Encounters:   12/02/21 116/76   10/26/21 128/78   10/14/21 108/70       NPO Status:                                                                                 BMI:   Wt Readings from Last 3 Encounters:   12/02/21 210 lb (95.3 kg)   10/26/21 202 lb (91.6 kg)   10/14/21 202 lb (91.6 kg)     There is no height or weight on file to calculate BMI.    CBC:   Lab Results   Component Value Date    WBC 8.7 04/02/2021    RBC 5.15 04/02/2021    HGB 16.8 04/02/2021    HCT 51.0 04/02/2021    MCV 99.2 04/02/2021    RDW 15.3 04/02/2021     04/02/2021       CMP:   Lab Results   Component Value Date     04/23/2021    K 4.6 04/23/2021     04/23/2021    CO2 25 04/23/2021    BUN 22 04/23/2021    CREATININE 1.4 04/23/2021    GFRAA 59 04/23/2021    GFRAA >60 05/17/2013    AGRATIO 1.6 04/02/2021    LABGLOM 48 04/23/2021    LABGLOM >60 05/23/2011    GLUCOSE 201 04/23/2021    GLUCOSE 40 05/23/2011    GLUCOSE NEGATIVE 05/23/2011    PROT 7.5 04/02/2021    PROT 6.2 12/04/2012    CALCIUM 9.6 04/23/2021    BILITOT 0.4 04/02/2021    ALKPHOS 142 04/02/2021    AST 16 04/02/2021 ALT 10 04/02/2021       POC Tests: No results for input(s): POCGLU, POCNA, POCK, POCCL, POCBUN, POCHEMO, POCHCT in the last 72 hours. Coags:   Lab Results   Component Value Date    PROTIME 31.4 11/17/2021    INR 2.6 11/17/2021    INR 2.18 11/04/2019    APTT 36.1 11/17/2017       HCG (If Applicable): No results found for: PREGTESTUR, PREGSERUM, HCG, HCGQUANT     ABGs:   Lab Results   Component Value Date    PO2ART 165.0 12/04/2012    WSC0HZB 26.0 12/04/2012    DLL1RWJ 19.4 12/04/2012        Type & Screen (If Applicable):  No results found for: LABABO, LABRH    Drug/Infectious Status (If Applicable):  No results found for: HIV, HEPCAB    COVID-19 Screening (If Applicable): No results found for: COVID19        Anesthesia Evaluation  Patient summary reviewed no history of anesthetic complications:   Airway: Mallampati: III        Dental:          Pulmonary:   (+) pneumonia: no interval change,  COPD:  shortness of breath:  sleep apnea:  current smoker                           Cardiovascular:  Exercise tolerance: poor (<4 METS),   (+) hypertension:, angina:, pacemaker: pacemaker and dependent, CAD: no interval change, CABG/stent: no interval change, dysrhythmias: atrial fibrillation,          Beta Blocker:  Not on Beta Blocker         Neuro/Psych:   (+) CVA:, headaches:,             GI/Hepatic/Renal:   (+) GERD:, PUD, renal disease: CRI and kidney stones,           Endo/Other:    (+) hypothyroidism, blood dyscrasia: anticoagulation therapy, arthritis: OA., malignancy/cancer (skin). Abdominal:             Vascular: Other Findings:           Anesthesia Plan      MAC and general     ASA 4       Induction: intravenous. Anesthetic plan and risks discussed with patient. Plan discussed with CRNA.               Deborah Cordova MD   12/9/2021

## 2021-12-10 ENCOUNTER — HOSPITAL ENCOUNTER (OUTPATIENT)
Dept: GENERAL RADIOLOGY | Age: 81
Discharge: HOME | End: 2021-12-10
Payer: MEDICARE

## 2021-12-10 ENCOUNTER — HOSPITAL ENCOUNTER (OUTPATIENT)
Age: 81
Discharge: HOME | End: 2021-12-10
Payer: MEDICARE

## 2021-12-10 DIAGNOSIS — Z01.818 PRE-OP TESTING: ICD-10-CM

## 2021-12-10 LAB
ANION GAP SERPL CALCULATED.3IONS-SCNC: 10 MMOL/L (ref 4–16)
APTT: 29.8 SECONDS (ref 25.1–37.1)
BASOPHILS ABSOLUTE: 0.1 K/CU MM
BASOPHILS RELATIVE PERCENT: 0.6 % (ref 0–1)
BUN BLDV-MCNC: 20 MG/DL (ref 6–23)
CALCIUM SERPL-MCNC: 9.1 MG/DL (ref 8.3–10.6)
CHLORIDE BLD-SCNC: 108 MMOL/L (ref 99–110)
CO2: 23 MMOL/L (ref 21–32)
CREAT SERPL-MCNC: 1.3 MG/DL (ref 0.9–1.3)
DIFFERENTIAL TYPE: ABNORMAL
EKG ATRIAL RATE: 74 BPM
EKG DIAGNOSIS: NORMAL
EKG Q-T INTERVAL: 406 MS
EKG QRS DURATION: 124 MS
EKG QTC CALCULATION (BAZETT): 456 MS
EKG R AXIS: -70 DEGREES
EKG T AXIS: 129 DEGREES
EKG VENTRICULAR RATE: 76 BPM
EOSINOPHILS ABSOLUTE: 0.2 K/CU MM
EOSINOPHILS RELATIVE PERCENT: 2.5 % (ref 0–3)
GFR AFRICAN AMERICAN: >60 ML/MIN/1.73M2
GFR NON-AFRICAN AMERICAN: 53 ML/MIN/1.73M2
GLUCOSE BLD-MCNC: 128 MG/DL (ref 70–99)
HCT VFR BLD CALC: 48.5 % (ref 42–52)
HEMOGLOBIN: 15.7 GM/DL (ref 13.5–18)
IMMATURE NEUTROPHIL %: 0.4 % (ref 0–0.43)
INR BLD: 1.38 INDEX
LYMPHOCYTES ABSOLUTE: 1.7 K/CU MM
LYMPHOCYTES RELATIVE PERCENT: 20.8 % (ref 24–44)
MCH RBC QN AUTO: 32.3 PG (ref 27–31)
MCHC RBC AUTO-ENTMCNC: 32.4 % (ref 32–36)
MCV RBC AUTO: 99.8 FL (ref 78–100)
MONOCYTES ABSOLUTE: 0.8 K/CU MM
MONOCYTES RELATIVE PERCENT: 9.6 % (ref 0–4)
NUCLEATED RBC %: 0 %
PDW BLD-RTO: 13.8 % (ref 11.7–14.9)
PLATELET # BLD: 229 K/CU MM (ref 140–440)
PMV BLD AUTO: 11.1 FL (ref 7.5–11.1)
POTASSIUM SERPL-SCNC: 4.6 MMOL/L (ref 3.5–5.1)
PROTHROMBIN TIME: 17.8 SECONDS (ref 11.7–14.5)
RBC # BLD: 4.86 M/CU MM (ref 4.6–6.2)
SARS-COV-2: NOT DETECTED
SEGMENTED NEUTROPHILS ABSOLUTE COUNT: 5.5 K/CU MM
SEGMENTED NEUTROPHILS RELATIVE PERCENT: 66.1 % (ref 36–66)
SODIUM BLD-SCNC: 141 MMOL/L (ref 135–145)
TOTAL IMMATURE NEUTOROPHIL: 0.03 K/CU MM
TOTAL NUCLEATED RBC: 0 K/CU MM
WBC # BLD: 8.3 K/CU MM (ref 4–10.5)

## 2021-12-10 PROCEDURE — 93010 ELECTROCARDIOGRAM REPORT: CPT | Performed by: INTERNAL MEDICINE

## 2021-12-10 PROCEDURE — 71046 X-RAY EXAM CHEST 2 VIEWS: CPT

## 2021-12-10 PROCEDURE — 80048 BASIC METABOLIC PNL TOTAL CA: CPT

## 2021-12-10 PROCEDURE — 85610 PROTHROMBIN TIME: CPT

## 2021-12-10 PROCEDURE — 85730 THROMBOPLASTIN TIME PARTIAL: CPT

## 2021-12-10 PROCEDURE — 93005 ELECTROCARDIOGRAM TRACING: CPT | Performed by: SURGERY

## 2021-12-10 PROCEDURE — 85025 COMPLETE CBC W/AUTO DIFF WBC: CPT

## 2021-12-10 PROCEDURE — 36415 COLL VENOUS BLD VENIPUNCTURE: CPT

## 2021-12-13 ENCOUNTER — ANESTHESIA (OUTPATIENT)
Dept: OPERATING ROOM | Age: 81
End: 2021-12-13
Payer: MEDICARE

## 2021-12-13 ENCOUNTER — HOSPITAL ENCOUNTER (OUTPATIENT)
Age: 81
Setting detail: OUTPATIENT SURGERY
Discharge: HOME | End: 2021-12-13
Attending: SURGERY | Admitting: SURGERY
Payer: MEDICARE

## 2021-12-13 VITALS
RESPIRATION RATE: 18 BRPM | SYSTOLIC BLOOD PRESSURE: 105 MMHG | OXYGEN SATURATION: 96 % | DIASTOLIC BLOOD PRESSURE: 66 MMHG

## 2021-12-13 VITALS
DIASTOLIC BLOOD PRESSURE: 62 MMHG | WEIGHT: 205 LBS | OXYGEN SATURATION: 93 % | HEART RATE: 65 BPM | BODY MASS INDEX: 29.35 KG/M2 | RESPIRATION RATE: 18 BRPM | TEMPERATURE: 96.9 F | SYSTOLIC BLOOD PRESSURE: 113 MMHG | HEIGHT: 70 IN

## 2021-12-13 DIAGNOSIS — Z01.818 PRE-OP TESTING: Primary | ICD-10-CM

## 2021-12-13 PROCEDURE — 2720000010 HC SURG SUPPLY STERILE: Performed by: SURGERY

## 2021-12-13 PROCEDURE — 2580000003 HC RX 258: Performed by: ANESTHESIOLOGY

## 2021-12-13 PROCEDURE — 6360000002 HC RX W HCPCS: Performed by: SURGERY

## 2021-12-13 PROCEDURE — 2780000010 HC IMPLANT OTHER: Performed by: SURGERY

## 2021-12-13 PROCEDURE — 3600000003 HC SURGERY LEVEL 3 BASE: Performed by: SURGERY

## 2021-12-13 PROCEDURE — 2580000003 HC RX 258: Performed by: NURSE ANESTHETIST, CERTIFIED REGISTERED

## 2021-12-13 PROCEDURE — 2580000003 HC RX 258: Performed by: SURGERY

## 2021-12-13 PROCEDURE — 2709999900 HC NON-CHARGEABLE SUPPLY: Performed by: SURGERY

## 2021-12-13 PROCEDURE — 3700000001 HC ADD 15 MINUTES (ANESTHESIA): Performed by: SURGERY

## 2021-12-13 PROCEDURE — 7100000010 HC PHASE II RECOVERY - FIRST 15 MIN: Performed by: SURGERY

## 2021-12-13 PROCEDURE — 7100000011 HC PHASE II RECOVERY - ADDTL 15 MIN: Performed by: SURGERY

## 2021-12-13 PROCEDURE — 3700000000 HC ANESTHESIA ATTENDED CARE: Performed by: SURGERY

## 2021-12-13 PROCEDURE — C1786 PMKR, SINGLE, RATE-RESP: HCPCS | Performed by: SURGERY

## 2021-12-13 PROCEDURE — 33227 REMOVE&REPLACE PM GEN SINGL: CPT | Performed by: SURGERY

## 2021-12-13 PROCEDURE — 3600000013 HC SURGERY LEVEL 3 ADDTL 15MIN: Performed by: SURGERY

## 2021-12-13 PROCEDURE — 2500000003 HC RX 250 WO HCPCS: Performed by: SURGERY

## 2021-12-13 PROCEDURE — 6360000002 HC RX W HCPCS: Performed by: NURSE ANESTHETIST, CERTIFIED REGISTERED

## 2021-12-13 DEVICE — PACEMAKER CRD UPLR/BPLR 50.8X42.6X7.4 MM 12.25 CC 22.5 GM: Type: IMPLANTABLE DEVICE | Site: CHEST | Status: FUNCTIONAL

## 2021-12-13 DEVICE — ENVELOPE PACEMKR L W2.9XL3.3IN ABSRB ANTIBACT TYRX: Type: IMPLANTABLE DEVICE | Site: CHEST | Status: FUNCTIONAL

## 2021-12-13 DEVICE — CABLE PACE LNG L12IN CHN A OR V SM CLP EPG TEMP DISP: Type: IMPLANTABLE DEVICE | Site: CHEST | Status: FUNCTIONAL

## 2021-12-13 RX ORDER — LIDOCAINE HYDROCHLORIDE 20 MG/ML
INJECTION, SOLUTION INTRAVENOUS PRN
Status: DISCONTINUED | OUTPATIENT
Start: 2021-12-13 | End: 2021-12-13 | Stop reason: SDUPTHER

## 2021-12-13 RX ORDER — ONDANSETRON 2 MG/ML
INJECTION INTRAMUSCULAR; INTRAVENOUS PRN
Status: DISCONTINUED | OUTPATIENT
Start: 2021-12-13 | End: 2021-12-13 | Stop reason: SDUPTHER

## 2021-12-13 RX ORDER — FENTANYL CITRATE 50 UG/ML
INJECTION, SOLUTION INTRAMUSCULAR; INTRAVENOUS PRN
Status: DISCONTINUED | OUTPATIENT
Start: 2021-12-13 | End: 2021-12-13 | Stop reason: SDUPTHER

## 2021-12-13 RX ORDER — DEXAMETHASONE SODIUM PHOSPHATE 4 MG/ML
INJECTION, SOLUTION INTRA-ARTICULAR; INTRALESIONAL; INTRAMUSCULAR; INTRAVENOUS; SOFT TISSUE PRN
Status: DISCONTINUED | OUTPATIENT
Start: 2021-12-13 | End: 2021-12-13 | Stop reason: SDUPTHER

## 2021-12-13 RX ORDER — CEFAZOLIN SODIUM 2 G/100ML
2000 INJECTION, SOLUTION INTRAVENOUS ONCE
Status: COMPLETED | OUTPATIENT
Start: 2021-12-13 | End: 2021-12-13

## 2021-12-13 RX ORDER — SODIUM CHLORIDE, SODIUM LACTATE, POTASSIUM CHLORIDE, CALCIUM CHLORIDE 600; 310; 30; 20 MG/100ML; MG/100ML; MG/100ML; MG/100ML
INJECTION, SOLUTION INTRAVENOUS CONTINUOUS
Status: DISCONTINUED | OUTPATIENT
Start: 2021-12-13 | End: 2021-12-13 | Stop reason: HOSPADM

## 2021-12-13 RX ORDER — SODIUM CHLORIDE, SODIUM LACTATE, POTASSIUM CHLORIDE, CALCIUM CHLORIDE 600; 310; 30; 20 MG/100ML; MG/100ML; MG/100ML; MG/100ML
INJECTION, SOLUTION INTRAVENOUS CONTINUOUS PRN
Status: DISCONTINUED | OUTPATIENT
Start: 2021-12-13 | End: 2021-12-13 | Stop reason: SDUPTHER

## 2021-12-13 RX ORDER — PROPOFOL 10 MG/ML
INJECTION, EMULSION INTRAVENOUS PRN
Status: DISCONTINUED | OUTPATIENT
Start: 2021-12-13 | End: 2021-12-13 | Stop reason: SDUPTHER

## 2021-12-13 RX ORDER — LIDOCAINE HYDROCHLORIDE 10 MG/ML
INJECTION, SOLUTION INFILTRATION; PERINEURAL
Status: COMPLETED | OUTPATIENT
Start: 2021-12-13 | End: 2021-12-13

## 2021-12-13 RX ADMIN — SODIUM CHLORIDE, POTASSIUM CHLORIDE, SODIUM LACTATE AND CALCIUM CHLORIDE: 600; 310; 30; 20 INJECTION, SOLUTION INTRAVENOUS at 07:28

## 2021-12-13 RX ADMIN — SODIUM CHLORIDE, POTASSIUM CHLORIDE, SODIUM LACTATE AND CALCIUM CHLORIDE: 600; 310; 30; 20 INJECTION, SOLUTION INTRAVENOUS at 07:01

## 2021-12-13 RX ADMIN — LIDOCAINE HYDROCHLORIDE 50 MG: 20 INJECTION, SOLUTION INTRAVENOUS at 07:35

## 2021-12-13 RX ADMIN — PROPOFOL 20 MG: 10 INJECTION, EMULSION INTRAVENOUS at 07:37

## 2021-12-13 RX ADMIN — FENTANYL CITRATE 50 MCG: 50 INJECTION, SOLUTION INTRAMUSCULAR; INTRAVENOUS at 07:35

## 2021-12-13 RX ADMIN — FENTANYL CITRATE 50 MCG: 50 INJECTION, SOLUTION INTRAMUSCULAR; INTRAVENOUS at 07:28

## 2021-12-13 RX ADMIN — DEXAMETHASONE SODIUM PHOSPHATE 8 MG: 4 INJECTION, SOLUTION INTRAMUSCULAR; INTRAVENOUS at 07:40

## 2021-12-13 RX ADMIN — CEFAZOLIN SODIUM 2000 MG: 2 INJECTION, SOLUTION INTRAVENOUS at 07:43

## 2021-12-13 RX ADMIN — ONDANSETRON 4 MG: 2 INJECTION INTRAMUSCULAR; INTRAVENOUS at 07:40

## 2021-12-13 ASSESSMENT — PULMONARY FUNCTION TESTS
PIF_VALUE: 1
PIF_VALUE: 0
PIF_VALUE: 1
PIF_VALUE: 0
PIF_VALUE: 1
PIF_VALUE: 0
PIF_VALUE: 1
PIF_VALUE: 0
PIF_VALUE: 1
PIF_VALUE: 0
PIF_VALUE: 1
PIF_VALUE: 1
PIF_VALUE: 0
PIF_VALUE: 1

## 2021-12-13 ASSESSMENT — PAIN SCALES - GENERAL
PAINLEVEL_OUTOF10: 0
PAINLEVEL_OUTOF10: 0

## 2021-12-13 ASSESSMENT — PAIN - FUNCTIONAL ASSESSMENT: PAIN_FUNCTIONAL_ASSESSMENT: 0-10

## 2021-12-13 ASSESSMENT — LIFESTYLE VARIABLES: SMOKING_STATUS: 1

## 2021-12-13 NOTE — OP NOTE
Date of Procedure: 12/13/21        Surgeon: Emma Barcenas MD      Anesthesia: MAC      Preoperative Diagnosis:   Pacer dependent rythm  Bradycardia with syncope   SSS   Postoperative Diagnosis: eol           Operation:    Permanent VVI apcemaker explantation   Testing thresholds of the V lead  Pacer pocket revision and placementt of Tyrex pouch  Placement of new Medtronic VVI generator        Details of Procedure:  Patient was brought to the operating room after request from cardiology and preoperative evaluations and discussions with patient and family.       Time out per checklist confirmed.      Left infraclavicular area was prepared and draped. Local anesthesia was infiltrated with 1% Lidocaine. Incision below the clavicle was reopened and the pocket entered.      The V lead  disconnected and tested. Leads were secured to the cephalic vein and pectoralis fascia. Lead were connected to  the new Medtronic generator. The pocket revised and expanded inferiorly and medially. The generator was placed in a pocket  With enclosure of Tyrex pouch under the lower skin flap and anchored to the pectoralis fascia.    Wound was irrigated with antibiotic solution and closed in layers.      Blood loss was minimal. Sponge and instrument and needle  count was correct before closure.      The lead thresholds were rechecked after closure   Patient tolerated the procedure well.

## 2021-12-13 NOTE — PROGRESS NOTES
12/10/21 - Dr. Jesse Hobbs notified, PT 17.8, INR 1.38 . Verified with him patient did stop Warfarin 12/7/21. No orders received.
8991- Patient arrived back to [facility]. Report given to this nurse from  [facility] PACU Patient A&O x4. Beverage of choice offered to patient. Call light in reach and bed in lowest position. Spouse at bedside. 1243 IV removed, discharge instructions given To patient and his spouse Ruthy both verbalized understanding  7964- Patient sitting on side of bed getting dressed with assistance from Naida- Patient escorted to car via wheelchair spouse Ruthy is taking the patient home.
Durable Power of  for Healthcare, please bring in a copy. 11. Please bring picture ID,  insurance card, paperwork from the doctors office    (H & P, Consent, & card for implantable devices). 12. Take a shower the night before or morning of your procedure, do not apply any lotion, oil or powder. 13. Wear a mask covering your nose & mouth when entering the hospital. Have your covid-19 test performed within 2-7 days of your                  Surgery-scheduled 12/9/21. Quarantine yourself after the test until after your surgery.

## 2021-12-13 NOTE — ANESTHESIA POSTPROCEDURE EVALUATION
Department of Anesthesiology  Postprocedure Note    Patient: Edvin Newton  MRN: [id number]  YOB: 1940  Date of evaluation: 12/13/2021  Time:  8:19 AM     Procedure Summary     Date: 12/13/21 Room / Location: [address] [place] [address]    Anesthesia Start: 6757 Anesthesia Stop: 5154    Procedure: PACEMAKER GENERATOR CHANGE (N/A Chest) Diagnosis:       Pacer at end of battery life      (Pacer at end of battery life [Z45.010])    Surgeons: Allison Valdivia MD Responsible Provider: Mily Preston DO    Anesthesia Type: MAC, general ASA Status: 4          Anesthesia Type: MAC, general    Deniz Phase I:      Deniz Phase II:      Last vitals: Reviewed and per EMR flowsheets.        Anesthesia Post Evaluation    Patient location during evaluation: bedside  Patient participation: complete - patient participated  Level of consciousness: awake and alert  Pain score: 0  Airway patency: patent  Nausea & Vomiting: no nausea and no vomiting  Complications: no  Cardiovascular status: blood pressure returned to baseline and hemodynamically stable  Respiratory status: acceptable and spontaneous ventilation  Hydration status: euvolemic

## 2021-12-16 RX ORDER — ALIROCUMAB 75 MG/ML
75 INJECTION, SOLUTION SUBCUTANEOUS
Qty: 6 PEN | Refills: 1 | Status: SHIPPED | OUTPATIENT
Start: 2021-12-16 | End: 2022-06-27

## 2021-12-28 RX ORDER — LEVOTHYROXINE SODIUM 0.03 MG/1
25 TABLET ORAL DAILY
Qty: 90 TABLET | Refills: 3 | Status: SHIPPED | OUTPATIENT
Start: 2021-12-28 | End: 2022-10-31

## 2022-01-07 DIAGNOSIS — E03.4 HYPOTHYROIDISM DUE TO ACQUIRED ATROPHY OF THYROID: ICD-10-CM

## 2022-01-07 DIAGNOSIS — R73.03 PREDIABETES: ICD-10-CM

## 2022-01-07 DIAGNOSIS — E78.2 MIXED HYPERLIPIDEMIA: ICD-10-CM

## 2022-01-07 DIAGNOSIS — I15.9 HYPERTENSION, SECONDARY: ICD-10-CM

## 2022-01-07 LAB
A/G RATIO: 1.4 (ref 1.1–2.2)
ALBUMIN SERPL-MCNC: 4.3 G/DL (ref 3.4–5)
ALP BLD-CCNC: 120 U/L (ref 40–129)
ALT SERPL-CCNC: 16 U/L (ref 10–40)
ANION GAP SERPL CALCULATED.3IONS-SCNC: 14 MMOL/L (ref 3–16)
AST SERPL-CCNC: 18 U/L (ref 15–37)
BASOPHILS ABSOLUTE: 0 K/UL (ref 0–0.2)
BASOPHILS RELATIVE PERCENT: 0.3 %
BILIRUB SERPL-MCNC: 0.6 MG/DL (ref 0–1)
BUN BLDV-MCNC: 23 MG/DL (ref 7–20)
CALCIUM SERPL-MCNC: 9.2 MG/DL (ref 8.3–10.6)
CHLORIDE BLD-SCNC: 106 MMOL/L (ref 99–110)
CHOLESTEROL, TOTAL: 154 MG/DL (ref 0–199)
CO2: 22 MMOL/L (ref 21–32)
CREAT SERPL-MCNC: 1.4 MG/DL (ref 0.8–1.3)
EOSINOPHILS ABSOLUTE: 0.1 K/UL (ref 0–0.6)
EOSINOPHILS RELATIVE PERCENT: 1.4 %
GFR AFRICAN AMERICAN: 58
GFR NON-AFRICAN AMERICAN: 48
GLUCOSE BLD-MCNC: 128 MG/DL (ref 70–99)
HCT VFR BLD CALC: 46.1 % (ref 40.5–52.5)
HDLC SERPL-MCNC: 73 MG/DL (ref 40–60)
HEMOGLOBIN: 15.3 G/DL (ref 13.5–17.5)
LDL CHOLESTEROL CALCULATED: 62 MG/DL
LYMPHOCYTES ABSOLUTE: 1.8 K/UL (ref 1–5.1)
LYMPHOCYTES RELATIVE PERCENT: 19.5 %
MCH RBC QN AUTO: 32.1 PG (ref 26–34)
MCHC RBC AUTO-ENTMCNC: 33.3 G/DL (ref 31–36)
MCV RBC AUTO: 96.4 FL (ref 80–100)
MONOCYTES ABSOLUTE: 1.1 K/UL (ref 0–1.3)
MONOCYTES RELATIVE PERCENT: 12.1 %
NEUTROPHILS ABSOLUTE: 6.1 K/UL (ref 1.7–7.7)
NEUTROPHILS RELATIVE PERCENT: 66.7 %
PDW BLD-RTO: 14 % (ref 12.4–15.4)
PLATELET # BLD: 206 K/UL (ref 135–450)
PMV BLD AUTO: 9.4 FL (ref 5–10.5)
POTASSIUM SERPL-SCNC: 4.1 MMOL/L (ref 3.5–5.1)
RBC # BLD: 4.78 M/UL (ref 4.2–5.9)
SODIUM BLD-SCNC: 142 MMOL/L (ref 136–145)
TOTAL PROTEIN: 7.3 G/DL (ref 6.4–8.2)
TRIGL SERPL-MCNC: 93 MG/DL (ref 0–150)
TSH SERPL DL<=0.05 MIU/L-ACNC: 4.29 UIU/ML (ref 0.27–4.2)
VLDLC SERPL CALC-MCNC: 19 MG/DL
WBC # BLD: 9.2 K/UL (ref 4–11)

## 2022-01-08 LAB
ESTIMATED AVERAGE GLUCOSE: 148.5 MG/DL
HBA1C MFR BLD: 6.8 %

## 2022-01-12 PROBLEM — Z01.818 PRE-OP TESTING: Status: RESOLVED | Noted: 2018-05-30 | Resolved: 2022-01-12

## 2022-01-17 RX ORDER — AMLODIPINE BESYLATE 10 MG/1
TABLET ORAL
Qty: 90 TABLET | Refills: 3 | Status: SHIPPED | OUTPATIENT
Start: 2022-01-17

## 2022-01-18 NOTE — PROGRESS NOTES
1/18/22    Venice Kirk  1940    Chief Complaint   Patient presents with    Tremors     pt presents for 3 month f/u, pt states tremors have been the same since last visit, presents in bilat hand and right leg       History of Present Illness  Amanda Johnson is a 78 y.o. male presenting today for follow-up of: RLE tremor/myoclonus. Amanda Johnson remains on ropinirole 0.25 mg taking 1  tablet at bedtime. He is also on zonisamide 200 mg at bedtime and gabapentin 100 mg twice daily. He currently receives Botox injections for myoclonus with Dr. Cassidy Vidal, last injection was in November 2021. He does not feel this was helpful, he received 180 units-he started out at 50 units originally. He does not feel his tremor is better either. On last visit we discussed initiating primidone if Botox was not successful, as of last visit he was improving in regard to spasticity but was still having tremor. We also discussed possible MRI in the future his lumbar area to rule out spinal myoclonus. Balance therapy was to be ordered on this visit per previous visit note. Current Outpatient Medications   Medication Sig Dispense Refill    amLODIPine (NORVASC) 10 MG tablet TAKE 1 TABLET BY MOUTH EVERY DAY 90 tablet 3    levothyroxine (SYNTHROID) 25 MCG tablet Take 1 tablet by mouth Daily 90 tablet 3    alirocumab (PRALUENT) 75 MG/ML SOAJ injection pen Inject 1 mL into the skin every 14 days 6 pen 1    rOPINIRole (REQUIP) 0.25 MG tablet Take 1 tablet by mouth daily (Patient taking differently: Take 0.25 mg by mouth nightly ) 90 tablet 3    metoprolol succinate (TOPROL XL) 25 MG extended release tablet       onabotulinumtoxin A (BOTOX) 100 units injection Botox 100 unit injection   Take 100 units by injection route.       trihexyphenidyl (ARTANE) 2 MG tablet TAKE 1 TABLET BY MOUTH EVERY DAY 90 tablet 1    fluticasone-salmeterol (ADVAIR HFA) 230-21 MCG/ACT inhaler Inhale 2 puffs into the lungs 2 times daily 3 each 3    gabapentin (NEURONTIN) 100 MG capsule Take 1 capsule by mouth 2 times daily for 90 days. 180 capsule 2    famotidine (PEPCID) 20 MG tablet Take 0.5 tablets by mouth 2 times daily 180 tablet 1    zonisamide (ZONEGRAN) 100 MG capsule TAKE 2 CAPSULES BY MOUTH AT BEDTIME      warfarin (COUMADIN) 2 MG tablet Take 1 tab by mouth daily 90 tablet 1    trimethoprim (TRIMPEX) 100 MG tablet       UNABLE TO FIND GSH-3 Cell Defense      Saw Palmetto, Serenoa repens, (SAW PALMETTO BERRY PO) Take by mouth      MAGNESIUM CHLORIDE PO Take 1 each by mouth daily       aspirin 81 MG EC tablet Take 1 tablet by mouth daily 30 tablet 3    Multiple Vitamins-Minerals (CENTRUM PO) Take 1 tablet by mouth nightly Over The Counter       Cholecalciferol (VITAMIN D PO) Take 2,000 Units by mouth 2 times daily Over The Counter        No current facility-administered medications for this visit.        Physical Exam:  Also present during visit:   Mental Status   Orientation: oriented to person, oriented to place, oriented to problem and oriented to time    Mood/affectappropriate mood and appropriate affect   Memory/Other: recent memory intact, remote memory intact, fund of knowledge intact, attention span normal and concentration normal  Language  Language: (normal) language, no dysarthria, (normal) articulation and no dysphasia/aphasia  Cranial Nerves   Eyes: pupils normal size and reactive to light and visual fields appear full   CN III, IV, VI : extraocular muscle strength normal, normal pursuit, no nystagmus and no ptosis   Facial Motor: normal facial motor   CN XII: tongue protrudes midline  Motor/Coordination Exam   Power: motor strength appears intact throughout, no arm drift and normal tone   Coordination: normal finger-to-nose, forearm rotation intact and rapid alternating movement normal  Sensory Exam No Bradykinesia, No Dyskindesia, Normal strength, Normal Tone Normal bulk and Normal tone mild weakness in left foot with push, moderate myoclonus RLE with moderate tremor. Gait and Stance   Gait/Posture: Station normal, casual gait normal, ambulates independently, steady in Romberg's position with eyes open and closed, tiptoe abnormal and why was unable to do heel toe walking as he felt unsteady. /60 (Site: Left Upper Arm, Position: Sitting, Cuff Size: Large Adult)   Pulse 79   Ht 5' 10\" (1.778 m)   Wt 208 lb (94.3 kg)   SpO2 91%   BMI 29.84 kg/m²     Assessment and Plan     Diagnosis Orders   1. Resting tremor     2. Myoclonus     3. Spinal cord myoclonus     I am going to increase Devyn's dinnertime gabapentin dose, right now he is taking 100 mg so I will have him increase to taking 200 mg and if needed and tolerated he may go up to 300 mg at his dinnertime dose. He will continue taking 100 mg in a.m. I am going to order an MRI without contrast to his lumbar spine to rule out any possible etiology for the myoclonus in his RLE. He would like to continue Botox however he is going to wait until this services available at the [facility] office, he is on a call list. Sander Pardo will let me know in a month how the increased gabapentin is working for him. For now he will continue on his nightly zonisamide but we will consider titrating off if gabapentin is successful as he does not feel that the Nissen might has made any difference. I will plan on following up with Sander Pardo again in 3 months, sooner if the need arises. Medications prescribed for the patient were discussed in detail. This included a discussion of the potential risks versus potential benefits of the medications. The patient was given time to ask questions and these were answered to the best of my ability. The patient appeared to understand the information provided. Return in about 3 months (around 4/19/2022).     Kelly Sosa, APRN - NP

## 2022-01-19 ENCOUNTER — OFFICE VISIT (OUTPATIENT)
Dept: NEUROLOGY | Age: 82
End: 2022-01-19
Payer: MEDICARE

## 2022-01-19 VITALS
HEART RATE: 79 BPM | DIASTOLIC BLOOD PRESSURE: 60 MMHG | WEIGHT: 208 LBS | BODY MASS INDEX: 29.78 KG/M2 | HEIGHT: 70 IN | OXYGEN SATURATION: 91 % | SYSTOLIC BLOOD PRESSURE: 122 MMHG

## 2022-01-19 DIAGNOSIS — G25.3 SPINAL CORD MYOCLONUS: ICD-10-CM

## 2022-01-19 DIAGNOSIS — G25.2 RESTING TREMOR: Primary | ICD-10-CM

## 2022-01-19 DIAGNOSIS — G25.3 MYOCLONUS: ICD-10-CM

## 2022-01-19 PROCEDURE — G8427 DOCREV CUR MEDS BY ELIG CLIN: HCPCS | Performed by: NURSE PRACTITIONER

## 2022-01-19 PROCEDURE — 4040F PNEUMOC VAC/ADMIN/RCVD: CPT | Performed by: NURSE PRACTITIONER

## 2022-01-19 PROCEDURE — 1123F ACP DISCUSS/DSCN MKR DOCD: CPT | Performed by: NURSE PRACTITIONER

## 2022-01-19 PROCEDURE — G8484 FLU IMMUNIZE NO ADMIN: HCPCS | Performed by: NURSE PRACTITIONER

## 2022-01-19 PROCEDURE — 4004F PT TOBACCO SCREEN RCVD TLK: CPT | Performed by: NURSE PRACTITIONER

## 2022-01-19 PROCEDURE — G8417 CALC BMI ABV UP PARAM F/U: HCPCS | Performed by: NURSE PRACTITIONER

## 2022-01-19 PROCEDURE — 99214 OFFICE O/P EST MOD 30 MIN: CPT | Performed by: NURSE PRACTITIONER

## 2022-01-19 RX ORDER — WARFARIN SODIUM 2 MG/1
TABLET ORAL
COMMUNITY
End: 2022-03-14

## 2022-01-19 RX ORDER — GABAPENTIN 100 MG/1
CAPSULE ORAL
Qty: 120 CAPSULE | Refills: 2
Start: 2022-01-19 | End: 2022-02-08 | Stop reason: DRUGHIGH

## 2022-01-21 ENCOUNTER — TELEPHONE (OUTPATIENT)
Dept: NEUROLOGY | Age: 82
End: 2022-01-21

## 2022-01-21 NOTE — TELEPHONE ENCOUNTER
Central scheduling called stating that [facility] MRI leads are not compadable with the patient's pacemaker. Patient will have to have his order sent somewhere else such as [facility].

## 2022-01-24 ENCOUNTER — OFFICE VISIT (OUTPATIENT)
Dept: INTERNAL MEDICINE CLINIC | Age: 82
End: 2022-01-24
Payer: MEDICARE

## 2022-01-24 VITALS
OXYGEN SATURATION: 97 % | DIASTOLIC BLOOD PRESSURE: 86 MMHG | WEIGHT: 210 LBS | BODY MASS INDEX: 30.06 KG/M2 | HEIGHT: 70 IN | TEMPERATURE: 97.1 F | SYSTOLIC BLOOD PRESSURE: 114 MMHG | HEART RATE: 88 BPM

## 2022-01-24 DIAGNOSIS — Z79.01 LONG TERM CURRENT USE OF ANTICOAGULANT THERAPY: ICD-10-CM

## 2022-01-24 DIAGNOSIS — Z95.0 CARDIAC PACEMAKER IN SITU: ICD-10-CM

## 2022-01-24 DIAGNOSIS — G25.2 RESTING TREMOR: ICD-10-CM

## 2022-01-24 DIAGNOSIS — R73.03 PREDIABETES: Primary | ICD-10-CM

## 2022-01-24 DIAGNOSIS — E03.4 HYPOTHYROIDISM DUE TO ACQUIRED ATROPHY OF THYROID: ICD-10-CM

## 2022-01-24 LAB
INTERNATIONAL NORMALIZATION RATIO, POC: 2.5
PROTHROMBIN TIME, POC: 30.3

## 2022-01-24 PROCEDURE — G8417 CALC BMI ABV UP PARAM F/U: HCPCS | Performed by: FAMILY MEDICINE

## 2022-01-24 PROCEDURE — 4004F PT TOBACCO SCREEN RCVD TLK: CPT | Performed by: FAMILY MEDICINE

## 2022-01-24 PROCEDURE — G8484 FLU IMMUNIZE NO ADMIN: HCPCS | Performed by: FAMILY MEDICINE

## 2022-01-24 PROCEDURE — 85610 PROTHROMBIN TIME: CPT | Performed by: FAMILY MEDICINE

## 2022-01-24 PROCEDURE — 99214 OFFICE O/P EST MOD 30 MIN: CPT | Performed by: FAMILY MEDICINE

## 2022-01-24 PROCEDURE — 4040F PNEUMOC VAC/ADMIN/RCVD: CPT | Performed by: FAMILY MEDICINE

## 2022-01-24 PROCEDURE — G8427 DOCREV CUR MEDS BY ELIG CLIN: HCPCS | Performed by: FAMILY MEDICINE

## 2022-01-24 PROCEDURE — 1123F ACP DISCUSS/DSCN MKR DOCD: CPT | Performed by: FAMILY MEDICINE

## 2022-01-24 ASSESSMENT — ENCOUNTER SYMPTOMS
CONSTIPATION: 0
DIARRHEA: 0
SORE THROAT: 0
COLOR CHANGE: 0
VOMITING: 0
CHEST TIGHTNESS: 0
SHORTNESS OF BREATH: 0
ABDOMINAL PAIN: 0

## 2022-01-24 NOTE — PROGRESS NOTES
Subjective:      Chief Complaint   Patient presents with    3 Month Follow-Up     INR        HPI:  Elodia Adhikari is a 78 y.o. male who presents today for follow up of chronic conditions as listed below. INR:  2.5 today. Prediabetes:  States he tried cutting out soda, but has been drinking orange juice instead. Does not eat a lot of sweets, has been trying to decrease breads/carbs. Hypothyroidism:  Denying any symptoms. Tremor:  Botox dose was increased by neurology, feels it might be helping a little. Established with new cardiologist last month at [facility], has follow up again next month. Had pacemaker generator change last month. Reports procedure went well, denying any symptoms. Was told he can have MRI's now. Feeling well today, no acute concerns. Labs reviewed.       Past Medical History:   Diagnosis Date    Acid reflux     Adenomatous polyp of colon 1-2009    last colonoscopy recomm 3 yr follow up    Anticoagulated 2010    takes for afib Dr Stu Madrigal monitors INR 1.2 on 02/08/2018    Arthritis     \"arthritis in low back\"    CAD (coronary artery disease)     follow with dr Zayra Ramsey- cardiac clearance for surgery done 10/31/2017 on chart)    Chronic atrial fibrillation (HCC)     Sees Dr. Asher Trinh COPD (chronic obstructive pulmonary disease) ([facility] [address]     follows with dr Qamar Pichardo Diverticulitis     Diverticulosis of colon     left colon    Enlarged prostate     \"had to ream me out a couple of times\":   Acosta Smiley H/O cardiac catheterization 1/31/2006    R codominant, RCA patent, LAD patent with minimal nonobstructive irreg, CIRC patent and codominant, RI small but patent    H/O cardiovascular stress test 5/18/10    EF81% decrease in perfusion in inferoseptal segments c/w paced rhythm, LV small in volume    H/O cardiovascular stress test 5/5/2016    lexiscan-normal,EF70%    H/O cardiovascular stress test 6/2/2016    treadmill    H/O Doppler Carotid ultrasound 11/01/2019    Mild (0-49%) disease of the Bilateral proximal Internal carotid artery.  H/O Doppler ultrasound 06/15/2016    minimal plaque throughout bilaterally    H/O echocardiogram 5/3/16    EF 50-55%. Mild concentric LV hypertrophy with low normal systolic function. Mod bilateral atrial and right ventricle dilatation. Mild AR, MR, TR, KS. Absence of pericardial effsion.  H/O echocardiogram 8/4/11    EF(not given in faxed records) mild MR, mod LAE, mild AR    H/O echocardiogram 09/18/2019    EF 55-60%,  The left atrium is moderate to severely dilated.  H/O percutaneous left heart catheterization 5/12/16    left circumflex is 60% very distal stenosis at the bifurcation, OM1 is 99% mid segment stenosis    H/O unstable angina     History of Doppler ultrasound 3/29/12    Abd Ao Duplex - no abdominal aoritic aneurysm or iliac aneurysm identified    History of Doppler ultrasound 10/19/10    carotid - 0-19% bilateral ICAs, R and L carotid system show minimal atheromatous diseassse without stenosis    History of Holter monitoring 10/19/10    rhythm is ventricular paced with underlying a-fib    History of nuclear stress test 02/06/2020    Normal study.     Hx of CT scan of chest 8/17/10    mod-severe centrilobular emphysematous changes    HX OTHER MEDICAL     Primary Care Physician Is Dr. Nanette Sanchez    Hyperlipidemia     Hypertension     Hypothyroidism due to acquired atrophy of thyroid 2/29/2020    Kidney stones \"Been Having Kidney Stones For 39 Years\"    Passed Kidney Stones Several Times    Nephrolithiasis     with hematuria episodes followed by Dr Jerry Faust Nocturia     Pacemaker 0-1494    S/P AV ablation    Peptic ulcer, unspecified site, unspecified as acute or chronic, without mention of hemorrhage or perforation 1983    per EGD    Pneumonia Dx 2000's    Shortness of breath     Skin Cancer Arms And Ears In Past    Excision Skin Cancer Arms And Ears    Sleep apnea     No CPAP- had sleep study done 10+ yrs ago    Stented coronary artery 5/12/16    3.0 X 12 MM ELIGIO stent to OM 1    Teeth missing     Lower    Wears dentures     Full Lower    Wears glasses         Past Surgical History:   Procedure Laterality Date   Yosi Castro CARDIAC PACEMAKER PLACEMENT  5-3-99, 1-29-14    Medtronic [place] SR Pacemaker Implanted    COLONOSCOPY  8-12 - Dr Ronnell Singh    Polyps Removed In Past    CORONARY ANGIOPLASTY WITH STENT PLACEMENT      per old chart had cath with stent placement done 5/2016    CYSTOSCOPY Left 11/17/2017    cysto, left retrograde, flugeration of prostate    DENTAL SURGERY      Teeth Extracted In Past    ENDOSCOPY, COLON, DIAGNOSTIC  1983    KNEE SURGERY Right 1966    \"shaved the kneecap\"    LITHOTRIPSY  2009, 2014    Kidney Stones    PACEMAKER CHANGE N/A 12/13/2021    PACEMAKER GENERATOR CHANGE performed by Debora Samano MD ritchie [facility] OR/INDIRA PACER BUT OLD LEADS ARE NOT MRI COMPATIBLE    PACEMAKER PLACEMENT  05/03/1999    Medtronic [place] SR Pacemaker Implanted    PACEMAKER PLACEMENT  01/29/2014    Medtronic Sensia SR Pacemaker Implanted( had to replace the whole thing in 2014- that was my 3rd one - first one in [address] [place] [address] and next one 2007\"    SKIN CANCER EXCISION  In Past    Skin Cancer Excised Arms And Ears       Social History     Tobacco Use    Smoking status: Current Every Day Smoker     Packs/day: 0.50     Years: 67.00     Pack years: 33.50     Types: Cigarettes     Start date: 8/14/1948    Smokeless tobacco: Never Used   Substance Use Topics    Alcohol use: No     Alcohol/week: 0.0 standard drinks        Review of Systems   Constitutional: Negative for activity change, appetite change, chills, fever and unexpected weight change. HENT: Negative for congestion and sore throat. Respiratory: Negative for chest tightness and shortness of breath. Cardiovascular: Negative for chest pain and palpitations.    Gastrointestinal: Negative for abdominal pain, constipation, diarrhea and vomiting. Genitourinary: Negative for dysuria. Skin: Negative for color change. Neurological: Positive for tremors. Negative for dizziness and light-headedness. Psychiatric/Behavioral: Negative for dysphoric mood. The patient is not nervous/anxious. Prior to Visit Medications    Medication Sig Taking? Authorizing Provider   warfarin (JANTOVEN) 2 MG tablet  Yes Historical Provider, MD   gabapentin (NEURONTIN) 100 MG capsule Take one pill in AM and 1-3 pills at dinner time Yes ORVILLE Mcdonald NP   amLODIPine (NORVASC) 10 MG tablet TAKE 1 TABLET BY MOUTH EVERY DAY Yes Kimberly Gee MD   levothyroxine (SYNTHROID) 25 MCG tablet Take 1 tablet by mouth Daily Yes Kimberly Gee MD   alirocumab (PRALUENT) 75 MG/ML SOAJ injection pen Inject 1 mL into the skin every 14 days  Patient taking differently: Inject 75 mg into the skin every 30 days  Yes Kimberly Gee MD   rOPINIRole (REQUIP) 0.25 MG tablet Take 1 tablet by mouth daily  Patient taking differently: Take 0.25 mg by mouth nightly  Yes Jake Nieves MD   metoprolol succinate (TOPROL XL) 25 MG extended release tablet  Yes Historical Provider, MD   onabotulinumtoxin A (BOTOX) 100 units injection Botox 100 unit injection   Take 100 units by injection route.  Yes Historical Provider, MD   trihexyphenidyl (ARTANE) 2 MG tablet TAKE 1 TABLET BY MOUTH EVERY DAY Yes Kimberly Gee MD   fluticasone-salmeterol (ADVAIR HFA) 230-21 MCG/ACT inhaler Inhale 2 puffs into the lungs 2 times daily Yes Kimberly Gee MD   famotidine (PEPCID) 20 MG tablet Take 0.5 tablets by mouth 2 times daily Yes Kimberly Gee MD   zonisamide (ZONEGRAN) 100 MG capsule TAKE 2 CAPSULES BY MOUTH AT BEDTIME Yes Historical Provider, MD   warfarin (COUMADIN) 2 MG tablet Take 1 tab by mouth daily Yes Kimberly Gee MD   trimethoprim (TRIMPEX) 100 MG tablet  Yes Historical Provider, MD   UNABLE TO FIND GSH-3 Cell Defense Yes Historical Provider, MD Lira Palmetto, Serenoa repens, (SAW PALMETTO BERRY PO) Take by mouth Yes Historical Provider, MD   MAGNESIUM CHLORIDE PO Take 1 each by mouth daily  Yes Historical Provider, MD   aspirin 81 MG EC tablet Take 1 tablet by mouth daily Yes Donavan Tristan MD   Multiple Vitamins-Minerals (CENTRUM PO) Take 1 tablet by mouth nightly Over The Counter  Yes Historical Provider, MD   Cholecalciferol (VITAMIN D PO) Take 2,000 Units by mouth 2 times daily Over The Counter  Yes Historical Provider, MD          Objective:      /86 (Site: Right Upper Arm, Position: Sitting, Cuff Size: Medium Adult)   Pulse 88   Temp 97.1 °F (36.2 °C)   Ht 5' 10\" (1.778 m)   Wt 210 lb (95.3 kg)   SpO2 97%   BMI 30.13 kg/m²      Physical Exam  Vitals and nursing note reviewed. Constitutional:       General: He is not in acute distress. Appearance: Normal appearance. He is not ill-appearing or toxic-appearing. HENT:      Head: Normocephalic and atraumatic. Right Ear: External ear normal.      Left Ear: External ear normal.      Nose: Nose normal.      Mouth/Throat:      Pharynx: Oropharynx is clear. Eyes:      General: No scleral icterus. Right eye: No discharge. Left eye: No discharge. Extraocular Movements: Extraocular movements intact. Conjunctiva/sclera: Conjunctivae normal.   Cardiovascular:      Rate and Rhythm: Normal rate and regular rhythm. Heart sounds: Normal heart sounds. Pulmonary:      Effort: Pulmonary effort is normal.      Breath sounds: Normal breath sounds. No wheezing or rales. Musculoskeletal:         General: No deformity. Cervical back: Normal range of motion and neck supple. No rigidity. Skin:     General: Skin is warm and dry. Findings: No rash. Neurological:      General: No focal deficit present. Mental Status: He is alert. Mental status is at baseline. Motor: No weakness.       Comments: Tremor R leg   Psychiatric: Mood and Affect: Mood normal.         Behavior: Behavior normal.            Assessment / Plan:      1. Prediabetes  Worsening, HbA1c now 6.8. Discussed sugar content of fruit juices and to try substituting with water, especially given worsening creatinine. Will recheck labs in 3 months- if still worsening at that time, will need to discuss starting treatment. - Hemoglobin A1C; Future    2. Long term current use of anticoagulant therapy  INR therapeutic today. Continue current dose of coumadin. Repeat INR in 6 weeks. - POCT INR    3. Hypothyroidism due to acquired atrophy of thyroid  TSH slightly elevated but patient asymptomatic. Will continue current dose for now and recheck in 3 months.   - TSH without Reflex; Future    4. Cardiac pacemaker in situ  S/p recent generator change, doing well. Continue following with cardiology. 5. Resting tremor  Slightly improved with increased botox dose. Continue following with neurology. I have spent 35 minutes on this patient encounter. Patient voiced understanding and agreement with plan. All questions/concerns were addressed, risks/side effects of medications were reviewed. Return precautions and after visit summary were provided. Return in about 3 months (around 4/24/2022). or earlier as needed.       Avery Varghese MD

## 2022-01-28 ENCOUNTER — TELEPHONE (OUTPATIENT)
Dept: NEUROLOGY | Age: 82
End: 2022-01-28

## 2022-02-07 ENCOUNTER — TELEPHONE (OUTPATIENT)
Dept: INTERNAL MEDICINE CLINIC | Age: 82
End: 2022-02-07

## 2022-02-07 NOTE — TELEPHONE ENCOUNTER
Spoke with patient wife on [place] regarding Gabapentin she filled an old prescription from Dr. Prakash Lara. DCND The new neurologist filled a new prescription in 1/2022 the pharmacy has not received it. I let patient know to call the neurologist let them know it has not been received from pharmacy.

## 2022-02-08 NOTE — TELEPHONE ENCOUNTER
Pt wife needs Devyn's Gabapentin changed to a 90 day supply as it is cheaper for insurance for them. I have pended RX for you.

## 2022-02-10 RX ORDER — GABAPENTIN 100 MG/1
CAPSULE ORAL
Qty: 360 CAPSULE | Refills: 1 | Status: SHIPPED | OUTPATIENT
Start: 2022-02-10 | End: 2022-03-14

## 2022-02-14 ENCOUNTER — HOSPITAL ENCOUNTER (OUTPATIENT)
Dept: GENERAL RADIOLOGY | Age: 82
Discharge: HOME | End: 2022-02-14
Payer: MEDICARE

## 2022-02-14 ENCOUNTER — TELEPHONE (OUTPATIENT)
Dept: INTERNAL MEDICINE CLINIC | Age: 82
End: 2022-02-14

## 2022-02-14 ENCOUNTER — HOSPITAL ENCOUNTER (OUTPATIENT)
Age: 82
Discharge: HOME | End: 2022-02-14
Payer: MEDICARE
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
## 2022-02-14 DIAGNOSIS — S82.832A CLOSED FRACTURE OF DISTAL END OF LEFT FIBULA, UNSPECIFIED FRACTURE MORPHOLOGY, INITIAL ENCOUNTER: ICD-10-CM

## 2022-02-14 DIAGNOSIS — M25.572 ACUTE LEFT ANKLE PAIN: ICD-10-CM
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
## 2022-02-14 PROCEDURE — 73610 X-RAY EXAM OF ANKLE: CPT

## 2022-02-14 NOTE — TELEPHONE ENCOUNTER
----- Message from saambaamelania [employer] sent at 2/14/2022  7:56 AM EST -----  Subject: Referral Request    QUESTIONS   Reason for referral request? Pt twisted left ankle, would like an xray   order. Has the physician seen you for this condition before? No   Preferred Specialist (if applicable)? Do you already have an appointment scheduled? Additional Information for Provider?   ---------------------------------------------------------------------------  --------------  CALL BACK INFO  What is the best way for the office to contact you? OK to leave message on   voicemail  Preferred Call Back Phone Number?  [phone]

## 2022-02-14 NOTE — TELEPHONE ENCOUNTER
Spoke with pt's wife Ruthy. Pt slipped on ice sometime last week. He was hoping the ankle would resolve itself, but it is still painful and has turned black. Order entered, Per PCP. Pt's wife aware.

## 2022-02-14 NOTE — TELEPHONE ENCOUNTER
Per PCP, referral entered to Dr Elizabeth Leal. Pt scheduled with ORTHO for 02/15/22 @ 2:30. Pt's wife informed. No further action is needed, at this time.

## 2022-02-14 NOTE — TELEPHONE ENCOUNTER
Radiology Department called patient has a   Acute, mildly displaced obliquely oriented distal fibular fracture.         Spoke with Mary arambula MA and physician aware and appointment scheduled with Ortho.

## 2022-02-15 ENCOUNTER — OFFICE VISIT (OUTPATIENT)
Dept: ORTHOPEDIC SURGERY | Age: 82
End: 2022-02-15
Payer: MEDICARE

## 2022-02-15 VITALS
HEIGHT: 70 IN | OXYGEN SATURATION: 95 % | RESPIRATION RATE: 16 BRPM | BODY MASS INDEX: 30.06 KG/M2 | HEART RATE: 83 BPM | WEIGHT: 210 LBS

## 2022-02-15 DIAGNOSIS — S82.832A CLOSED FRACTURE OF DISTAL END OF LEFT FIBULA, UNSPECIFIED FRACTURE MORPHOLOGY, INITIAL ENCOUNTER: Primary | ICD-10-CM

## 2022-02-15 DIAGNOSIS — G25.2 RESTING TREMOR: ICD-10-CM

## 2022-02-15 PROCEDURE — 99203 OFFICE O/P NEW LOW 30 MIN: CPT

## 2022-02-15 NOTE — PROGRESS NOTES
minimal nonobstructive irreg, CIRC patent and codominant, RI small but patent    H/O cardiovascular stress test 5/18/10    EF81% decrease in perfusion in inferoseptal segments c/w paced rhythm, LV small in volume    H/O cardiovascular stress test 5/5/2016    lexiscan-normal,EF70%    H/O cardiovascular stress test 6/2/2016    treadmill    H/O Doppler Carotid ultrasound 11/01/2019    Mild (0-49%) disease of the Bilateral proximal Internal carotid artery.  H/O Doppler ultrasound 06/15/2016    minimal plaque throughout bilaterally    H/O echocardiogram 5/3/16    EF 50-55%. Mild concentric LV hypertrophy with low normal systolic function. Mod bilateral atrial and right ventricle dilatation. Mild AR, MR, TR, AR. Absence of pericardial effsion.  H/O echocardiogram 8/4/11    EF(not given in faxed records) mild MR, mod LAE, mild AR    H/O echocardiogram 09/18/2019    EF 55-60%,  The left atrium is moderate to severely dilated.  H/O percutaneous left heart catheterization 5/12/16    left circumflex is 60% very distal stenosis at the bifurcation, OM1 is 99% mid segment stenosis    H/O unstable angina     History of Doppler ultrasound 3/29/12    Abd Ao Duplex - no abdominal aoritic aneurysm or iliac aneurysm identified    History of Doppler ultrasound 10/19/10    carotid - 0-19% bilateral ICAs, R and L carotid system show minimal atheromatous diseassse without stenosis    History of Holter monitoring 10/19/10    rhythm is ventricular paced with underlying a-fib    History of nuclear stress test 02/06/2020    Normal study.     Hx of CT scan of chest 8/17/10    mod-severe centrilobular emphysematous changes    HX OTHER MEDICAL     Primary Care Physician Is Dr. Alexander Rain Hyperlipidemia     Hypertension     Hypothyroidism due to acquired atrophy of thyroid 2/29/2020    Kidney stones \"Been Having Kidney Stones For 39 Years\"    Passed Kidney Stones Several Times    Nephrolithiasis     with hematuria episodes followed by Dr Rufino Sandy    Nocturia     Pacemaker [id number]    S/P AV ablation    Peptic ulcer, unspecified site, unspecified as acute or chronic, without mention of hemorrhage or perforation 1983    per EGD    Pneumonia Dx 2000's    Shortness of breath     Skin Cancer Arms And Ears In Past    Excision Skin Cancer Arms And Ears    Sleep apnea     No CPAP- had sleep study done 10+ yrs ago    Stented coronary artery 5/12/16    3.0 X 12 MM ELIGIO stent to OM 1    Teeth missing     Lower    Wears dentures     Full Lower    Wears glasses      Past Surgical History:   Procedure Laterality Date   Charity Chang CARDIAC PACEMAKER PLACEMENT  5-3-99, 1-29-14    Medtronic Sensia SR Pacemaker Implanted    COLONOSCOPY  8-12 - Dr Ezio Arndt    Polyps Removed In Past    CORONARY ANGIOPLASTY WITH STENT PLACEMENT      per old chart had cath with stent placement done 5/2016    CYSTOSCOPY Left 11/17/2017    cysto, left retrograde, flugeration of prostate    DENTAL SURGERY      Teeth Extracted In Past    ENDOSCOPY, COLON, DIAGNOSTIC  1983    KNEE SURGERY Right 1966    \"shaved the kneecap\"    LITHOTRIPSY  2009, 2014    Kidney Stones    PACEMAKER CHANGE N/A 12/13/2021    PACEMAKER GENERATOR CHANGE performed by Juan Granados MD [facility] OR/INDIRA PACER BUT OLD LEADS ARE NOT MRI COMPATIBLE    PACEMAKER PLACEMENT  05/03/1999    Medtronic Sensia SR Pacemaker Implanted    PACEMAKER PLACEMENT  01/29/2014    Medtronic Sensia SR Pacemaker Implanted( had to replace the whole thing in 2014- that was my 3rd one - first one in [address] [place] [address] and next one 2007\"    SKIN CANCER EXCISION  In Past    Skin Cancer Excised Arms And Ears     Family History   Problem Relation Age of Onset    Heart Disease Mother     Cancer Father 76        Leukemia    Heart Disease Father         Atrial Fib    Cancer Sister         Leukemia    Cancer Brother         Leukemia     Social History     Socioeconomic History    Marital status:  Refill    gabapentin (NEURONTIN) 100 MG capsule Take one pill in AM and 1-3 pills at dinner time 360 capsule 1    warfarin (JANTOVEN) 2 MG tablet       amLODIPine (NORVASC) 10 MG tablet TAKE 1 TABLET BY MOUTH EVERY DAY 90 tablet 3    levothyroxine (SYNTHROID) 25 MCG tablet Take 1 tablet by mouth Daily 90 tablet 3    alirocumab (PRALUENT) 75 MG/ML SOAJ injection pen Inject 1 mL into the skin every 14 days (Patient taking differently: Inject 75 mg into the skin every 30 days ) 6 pen 1    rOPINIRole (REQUIP) 0.25 MG tablet Take 1 tablet by mouth daily (Patient taking differently: Take 0.25 mg by mouth nightly ) 90 tablet 3    metoprolol succinate (TOPROL XL) 25 MG extended release tablet       onabotulinumtoxin A (BOTOX) 100 units injection Botox 100 unit injection   Take 100 units by injection route.  trihexyphenidyl (ARTANE) 2 MG tablet TAKE 1 TABLET BY MOUTH EVERY DAY 90 tablet 1    fluticasone-salmeterol (ADVAIR HFA) 230-21 MCG/ACT inhaler Inhale 2 puffs into the lungs 2 times daily 3 each 3    famotidine (PEPCID) 20 MG tablet Take 0.5 tablets by mouth 2 times daily 180 tablet 1    zonisamide (ZONEGRAN) 100 MG capsule TAKE 2 CAPSULES BY MOUTH AT BEDTIME      trimethoprim (TRIMPEX) 100 MG tablet       UNABLE TO FIND GSH-3 Cell Defense      Saw Palmetto, Serenoa repens, (SAW PALMETTO BERRY PO) Take by mouth      MAGNESIUM CHLORIDE PO Take 1 each by mouth daily       aspirin 81 MG EC tablet Take 1 tablet by mouth daily 30 tablet 3    Multiple Vitamins-Minerals (CENTRUM PO) Take 1 tablet by mouth nightly Over The Counter       Cholecalciferol (VITAMIN D PO) Take 2,000 Units by mouth 2 times daily Over The Counter       warfarin (COUMADIN) 2 MG tablet Take 1 tab by mouth daily (Patient not taking: Reported on 2/15/2022) 90 tablet 1     No current facility-administered medications for this visit.      Allergies   Allergen Reactions    Other      \"Allergic To All Cholesterol Medications Causing Muscles To Ache\"    Atorvastatin      Patient states he is allergic to all cholesterol medications    Flomax [Tamsulosin Hcl] Other (See Comments)     Headache    Proscar [Finasteride] Other (See Comments)     Headache         Review of Systems   Constitutional: Negative for fever. HENT: Negative for facial swelling and rhinorrhea. Respiratory: Negative for cough and shortness of breath. Cardiovascular: Negative for chest pain. Gastrointestinal: Negative for nausea. Musculoskeletal: Positive for arthralgias, gait problem and joint swelling. Negative for back pain, myalgias, neck pain and neck stiffness. Skin: Negative for pallor and rash. Neurological: Negative for facial asymmetry and speech difficulty. Psychiatric/Behavioral: Negative for agitation and confusion. Examination:  General Exam:  Vitals: Pulse 83   Resp 16   Ht 5' 10\" (1.778 m)   Wt 210 lb (95.3 kg)   SpO2 95%   BMI 30.13 kg/m²    Physical Exam  Constitutional:       General: He is not in acute distress. Appearance: Normal appearance. He is normal weight. HENT:      Head: Normocephalic and atraumatic. Nose: No rhinorrhea. Eyes:      General: No scleral icterus. Right eye: No discharge. Left eye: No discharge. Pulmonary:      Effort: Pulmonary effort is normal. No respiratory distress. Breath sounds: No stridor. Musculoskeletal:      Cervical back: Normal range of motion. Left ankle: Swelling and ecchymosis present. No deformity or lacerations. Tenderness present over the lateral malleolus. No medial malleolus or base of 5th metatarsal tenderness. Decreased range of motion. Anterior drawer test negative. Normal pulse. Left foot: Normal range of motion and normal capillary refill. Swelling present. No deformity, bunion, Charcot foot, foot drop, prominent metatarsal heads, laceration, tenderness, bony tenderness or crepitus. Normal pulse. Comments: Patient notes ankle exam positive for swelling, ecchymosis, tenderness along the lateral malleolus. Dorsal pedal pulse 2+, brisk capillary refill. Anterior drawer test negative. Patient able to have limited active dorsiflexion and plantar flexion as well as flexion and extension of all his toes. Maneuvers were limited due to swelling and pain diffusely throughout the hindfoot and ankle. During passive manipulation the patient felt the most pain with eversion of the ankle along the lateral malleolus. Skin:     General: Skin is warm and dry. Capillary Refill: Capillary refill takes less than 2 seconds. Coloration: Skin is jaundiced. Neurological:      General: No focal deficit present. Mental Status: He is alert and oriented to person, place, and time. Psychiatric:         Mood and Affect: Mood normal.         Behavior: Behavior normal.            Diagnostic testing:  X-ray images were reviewed by myself and discussed with the patient: In my opinion, I agree with radiology statement that the patient has an acute, mildly displaced obliquely oriented distal fibular fracture.     Impression   Acute, mildly displaced obliquely oriented distal fibular fracture. Office Procedures:  No orders of the defined types were placed in this encounter. Assessment and Plan  1. Distal fibular fracture  -Patient educated that his fracture site is in a position that could be managed conservatively with a walking boot or fixed through surgery with the placement of a plate and screws. We had a lengthy discussion about the pros and cons of conservative management versus surgical management and that the determining factor between the 2 may be his willingness to limit overuse of his left foot and ankle. The patient both wished to avoid surgery but he expressed a hesitancy towards his own abilities to baby his ankle.   The patient is very active for his age group and enjoys doing manual labor around his house. -Due to the lack of a definitive answer towards a surgical approach I suggested that the patient see Dr. Isabel Molina in our office for a surgical consultation in 2 days.  -In order to secure the patient's ankle before his appointment with Dr. Isabel Molina we fitted the patient for a boot and distributed to him a walker. I stressed to the patient that he should do his best to only perform touchdown weightbearing while he waits to see Dr. Isabel Molina.   See Dr. Isabel Molina in 2 days for consult    Electronically signed by Donna Pinon PA-C on 2/15/2022 at 3:34 PM

## 2022-02-15 NOTE — PROGRESS NOTES
Patient is a 78y.o. year old male. Patient is in the office today with an ankle fracture. Patient states that he/she injured themselves by slipping on ice. Patient states that the injury happened on 2/8/22. Pain scale  4/10. Impression   Acute, mildly displaced obliquely oriented distal fibular fracture.

## 2022-02-17 ENCOUNTER — OFFICE VISIT (OUTPATIENT)
Dept: ORTHOPEDIC SURGERY | Age: 82
End: 2022-02-17
Payer: MEDICARE

## 2022-02-17 VITALS
HEART RATE: 83 BPM | HEIGHT: 70 IN | RESPIRATION RATE: 20 BRPM | OXYGEN SATURATION: 96 % | BODY MASS INDEX: 30.06 KG/M2 | WEIGHT: 210 LBS

## 2022-02-17 DIAGNOSIS — S82.65XA CLOSED NONDISPLACED FRACTURE OF LATERAL MALLEOLUS OF LEFT FIBULA, INITIAL ENCOUNTER: Primary | ICD-10-CM

## 2022-02-17 PROCEDURE — 99214 OFFICE O/P EST MOD 30 MIN: CPT | Performed by: ORTHOPAEDIC SURGERY

## 2022-02-17 PROCEDURE — 1123F ACP DISCUSS/DSCN MKR DOCD: CPT | Performed by: ORTHOPAEDIC SURGERY

## 2022-02-17 PROCEDURE — G8427 DOCREV CUR MEDS BY ELIG CLIN: HCPCS | Performed by: ORTHOPAEDIC SURGERY

## 2022-02-17 PROCEDURE — 4040F PNEUMOC VAC/ADMIN/RCVD: CPT | Performed by: ORTHOPAEDIC SURGERY

## 2022-02-17 PROCEDURE — G8417 CALC BMI ABV UP PARAM F/U: HCPCS | Performed by: ORTHOPAEDIC SURGERY

## 2022-02-17 PROCEDURE — G8484 FLU IMMUNIZE NO ADMIN: HCPCS | Performed by: ORTHOPAEDIC SURGERY

## 2022-02-17 PROCEDURE — 4004F PT TOBACCO SCREEN RCVD TLK: CPT | Performed by: ORTHOPAEDIC SURGERY

## 2022-02-17 RX ORDER — TRIHEXYPHENIDYL HYDROCHLORIDE 2 MG/1
TABLET ORAL
Qty: 90 TABLET | Refills: 1 | Status: SHIPPED | OUTPATIENT
Start: 2022-02-17 | End: 2022-09-12

## 2022-02-17 ASSESSMENT — ENCOUNTER SYMPTOMS
VOMITING: 0
COUGH: 0
EYE REDNESS: 0
CHEST TIGHTNESS: 0
SHORTNESS OF BREATH: 0
SHORTNESS OF BREATH: 0
BACK PAIN: 0
FACIAL SWELLING: 0
RHINORRHEA: 0
NAUSEA: 0
COLOR CHANGE: 0
WHEEZING: 0
EYE PAIN: 0

## 2022-02-17 NOTE — PROGRESS NOTES
Patient returns to the office with a left ankle fx. Pt stated that his pain has gone down along with the swelling. Pt stated his pain today is about a 4/10 with no use of medications for the foot. Pt stated he has been wearing the boot and using a walker with light weightbearing on it. Pt stated his pain is more aching and will decrease when resting. Pt denies any new injuries since the injury.

## 2022-02-17 NOTE — PROGRESS NOTES
2/17/2022   Chief Complaint   Patient presents with    Ankle Injury     Left        History of Present Illness:                             Andreea Jewell is a 78 y.o. male who returns today for evaluation of his left ankle injury. He previously saw the physician assistant who referred him here to decide about operative versus nonoperative management. He has been able to mobilize reasonably well with his walking boot he was given at the last visit. He has been using his walker for ambulation. He feels his ankle is healing well and he denies any feelings of severe instability. Patient states he slipped on the ice on 2/8/2022. Patient states he had originally hoped that it would heal on its own and that it was not broken. He states that after his fall he had no other associated injuries and he was able to regain his footing and finished walking to his mailbox. Over the next week, patient states that the swelling increased and he noticed increased bruising diffusely around his ankle and it continued to be very painful. In consultation with his PCP and after getting x-rays that showed a distal fibular fracture he is presenting to our office for management of his fracture. Patient states his pain is a 4 out of 10. He denies paresthesias to his ankle, foot, or toes. He admits to swelling and bruising around his ankle. Patient returns to the office with a left ankle fx. Pt stated that his pain has gone down along with the swelling. Pt stated his pain today is about a 4/10 with no use of medications for the foot. Pt stated he has been wearing the boot and using a walker with light weightbearing on it. Pt stated his pain is more aching and will decrease when resting. Pt denies any new injuries since the injury. Medical History  Patient's medications, allergies, past medical, surgical, social and family histories were reviewed and updated as appropriate.     Past Medical History:   Diagnosis Date    Acid reflux     Adenomatous polyp of colon 1-2009    last colonoscopy recomm 3 yr follow up    Anticoagulated 2010    takes for afib Dr Amanuel Emmanuel monitors INR 1.2 on 02/08/2018    Arthritis     \"arthritis in low back\"    CAD (coronary artery disease)     follow with dr Lavinia Pepper- cardiac clearance for surgery done 10/31/2017 on chart)    Chronic atrial fibrillation (HCC)     Sees Dr. Sandee Joe COPD (chronic obstructive pulmonary disease) ([address]     follows with dr Que Hope Diverticulitis     Diverticulosis of colon     left colon    Enlarged prostate     \"had to ream me out a couple of times\":   [facility] H/O cardiac catheterization 1/31/2006    R codominant, RCA patent, LAD patent with minimal nonobstructive irreg, CIRC patent and codominant, RI small but patent    H/O cardiovascular stress test 5/18/10    EF81% decrease in perfusion in inferoseptal segments c/w paced rhythm, LV small in volume    H/O cardiovascular stress test 5/5/2016    lexiscan-normal,EF70%    H/O cardiovascular stress test 6/2/2016    treadmill    H/O Doppler Carotid ultrasound 11/01/2019    Mild (0-49%) disease of the Bilateral proximal Internal carotid artery.  H/O Doppler ultrasound 06/15/2016    minimal plaque throughout bilaterally    H/O echocardiogram 5/3/16    EF 50-55%. Mild concentric LV hypertrophy with low normal systolic function. Mod bilateral atrial and right ventricle dilatation. Mild AR, MR, TR, MS. Absence of pericardial effsion.  H/O echocardiogram 8/4/11    EF(not given in faxed records) mild MR, mod LAE, mild AR    H/O echocardiogram 09/18/2019    EF 55-60%,  The left atrium is moderate to severely dilated.     H/O percutaneous left heart catheterization 5/12/16    left circumflex is 60% very distal stenosis at the bifurcation, OM1 is 99% mid segment stenosis    H/O unstable angina     History of Doppler ultrasound 3/29/12    Abd Ao Duplex - no abdominal aoritic aneurysm or iliac aneurysm identified    History of Doppler ultrasound 10/19/10    carotid - 0-19% bilateral ICAs, R and L carotid system show minimal atheromatous diseassse without stenosis    History of Holter monitoring 10/19/10    rhythm is ventricular paced with underlying a-fib    History of nuclear stress test 02/06/2020    Normal study.     Hx of CT scan of chest 8/17/10    mod-severe centrilobular emphysematous changes    HX OTHER MEDICAL     Primary Care Physician Is Dr. Juan Barajas    Hyperlipidemia     Hypertension     Hypothyroidism due to acquired atrophy of thyroid 2/29/2020    Kidney stones \"Been Having Kidney Stones For 39 Years\"    Passed Kidney Stones Several Times    Nephrolithiasis     with hematuria episodes followed by Dr Raul Cruz Nocturia     Pacemaker 1-3905    S/P AV ablation    Peptic ulcer, unspecified site, unspecified as acute or chronic, without mention of hemorrhage or perforation 1983    per EGD    Pneumonia Dx 2000's    Shortness of breath     Skin Cancer Arms And Ears In Past    Excision Skin Cancer Arms And Ears    Sleep apnea     No CPAP- had sleep study done 10+ yrs ago    Stented coronary artery 5/12/16    3.0 X 12 MM ELIGIO stent to OM 1    Teeth missing     Lower    Wears dentures     Full Lower    Wears glasses      Past Surgical History:   Procedure Laterality Date    CARDIAC PACEMAKER PLACEMENT  5-3-99, 1-29-14    Medtronic Sensia SR Pacemaker Implanted    COLONOSCOPY  8-12 - Dr Karishma Díaz    Polyps Removed In Past    CORONARY ANGIOPLASTY WITH STENT PLACEMENT      per old chart had cath with stent placement done 5/2016    CYSTOSCOPY Left 11/17/2017    cysto, left retrograde, flugeration of prostate    DENTAL SURGERY      Teeth Extracted In Past    ENDOSCOPY, COLON, DIAGNOSTIC  1983    KNEE SURGERY Right 1966    \"shaved the kneecap\"    LITHOTRIPSY  2009, 2014    Kidney Stones    PACEMAKER CHANGE N/A 12/13/2021    PACEMAKER GENERATOR CHANGE performed by Shane Harris MD [facility] OR/INDIRA PACER BUT OLD LEADS ARE NOT MRI COMPATIBLE    PACEMAKER PLACEMENT  05/03/1999    Medtronic Sensia SR Pacemaker Implanted    PACEMAKER PLACEMENT  01/29/2014    Medtronic Sensia SR Pacemaker Implanted( had to replace the whole thing in 2014- that was my 2rd one - first one in [facility] and next one 2007\"    SKIN CANCER EXCISION  In Past    Skin Cancer Excised Arms And Ears     Family History   Problem Relation Age of Onset    Heart Disease Mother     Cancer Father 76        Leukemia    Heart Disease Father         Atrial Fib    Cancer Sister         Leukemia    Cancer Brother         Leukemia     Social History     Socioeconomic History    Marital status:      Spouse name: None    Number of children: None    Years of education: None    Highest education level: None   Occupational History    None   Tobacco Use    Smoking status: Current Every Day Smoker     Packs/day: 0.50     Years: 67.00     Pack years: 33.50     Types: Cigarettes     Start date: 8/14/1948    Smokeless tobacco: Never Used   Vaping Use    Vaping Use: Never used   Substance and Sexual Activity    Alcohol use: No     Alcohol/week: 0.0 standard drinks    Drug use: No    Sexual activity: Never   Other Topics Concern    None   Social History Narrative    None     Social Determinants of Health     Financial Resource Strain: Low Risk     Difficulty of Paying Living Expenses: Not hard at all   Food Insecurity: No Food Insecurity    Worried About Running Out of Food in the Last Year: Never true    Scottie of Food in the Last Year: Never true   Transportation Needs:     Lack of Transportation (Medical): Not on file    Lack of Transportation (Non-Medical):  Not on file   Physical Activity:     Days of Exercise per Week: Not on file    Minutes of Exercise per Session: Not on file   Stress:     Feeling of Stress : Not on file   Social Connections:     Frequency of Communication with Friends and Family: Not on file    Frequency of Social Gatherings with Friends and Family: Not on file    Attends Mandaen Services: Not on file    Active Member of Clubs or Organizations: Not on file    Attends Club or Organization Meetings: Not on file    Marital Status: Not on file   Intimate Partner Violence:     Fear of Current or Ex-Partner: Not on file    Emotionally Abused: Not on file    Physically Abused: Not on file    Sexually Abused: Not on file   Housing Stability:     Unable to Pay for Housing in the Last Year: Not on file    Number of [place] in the Last Year: Not on file    Unstable Housing in the Last Year: Not on file     Current Outpatient Medications   Medication Sig Dispense Refill    gabapentin (NEURONTIN) 100 MG capsule Take one pill in AM and 1-3 pills at dinner time 360 capsule 1    warfarin (JANTOVEN) 2 MG tablet       amLODIPine (NORVASC) 10 MG tablet TAKE 1 TABLET BY MOUTH EVERY DAY 90 tablet 3    levothyroxine (SYNTHROID) 25 MCG tablet Take 1 tablet by mouth Daily 90 tablet 3    alirocumab (PRALUENT) 75 MG/ML SOAJ injection pen Inject 1 mL into the skin every 14 days (Patient taking differently: Inject 75 mg into the skin every 30 days ) 6 pen 1    rOPINIRole (REQUIP) 0.25 MG tablet Take 1 tablet by mouth daily (Patient taking differently: Take 0.25 mg by mouth nightly ) 90 tablet 3    metoprolol succinate (TOPROL XL) 25 MG extended release tablet       onabotulinumtoxin A (BOTOX) 100 units injection Botox 100 unit injection   Take 100 units by injection route.       trihexyphenidyl (ARTANE) 2 MG tablet TAKE 1 TABLET BY MOUTH EVERY DAY 90 tablet 1    fluticasone-salmeterol (ADVAIR HFA) 230-21 MCG/ACT inhaler Inhale 2 puffs into the lungs 2 times daily 3 each 3    famotidine (PEPCID) 20 MG tablet Take 0.5 tablets by mouth 2 times daily 180 tablet 1    zonisamide (ZONEGRAN) 100 MG capsule TAKE 2 CAPSULES BY MOUTH AT BEDTIME      warfarin (COUMADIN) 2 MG tablet Take 1 tab by mouth daily 90 tablet 1    trimethoprim (TRIMPEX) 100 MG tablet       UNABLE TO FIND GSH-3 Cell Defense      Saw Palmetto, Serenoa repens, (SAW PALMETTO BERRY PO) Take by mouth      MAGNESIUM CHLORIDE PO Take 1 each by mouth daily       aspirin 81 MG EC tablet Take 1 tablet by mouth daily 30 tablet 3    Multiple Vitamins-Minerals (CENTRUM PO) Take 1 tablet by mouth nightly Over The Counter       Cholecalciferol (VITAMIN D PO) Take 2,000 Units by mouth 2 times daily Over The Counter        No current facility-administered medications for this visit. Allergies   Allergen Reactions    Other      \"Allergic To All Cholesterol Medications Causing Muscles To Ache\"    Atorvastatin      Patient states he is allergic to all cholesterol medications    Flomax [Tamsulosin Hcl] Other (See Comments)     Headache    Proscar [Finasteride] Other (See Comments)     Headache         Review of Systems   Constitutional: Negative for chills and fever. HENT: Negative for congestion and sneezing. Eyes: Negative for pain and redness. Respiratory: Negative for chest tightness, shortness of breath and wheezing. Cardiovascular: Negative for chest pain and palpitations. Gastrointestinal: Negative for vomiting. Musculoskeletal: Positive for arthralgias. Skin: Negative for color change and rash. Neurological: Negative for weakness and numbness. Psychiatric/Behavioral: Negative for agitation. The patient is not nervous/anxious. Examination:  General Exam:  Vitals: Pulse 83   Resp 20   Ht 5' 10\" (1.778 m)   Wt 210 lb (95.3 kg)   SpO2 96%   BMI 30.13 kg/m²    Physical Exam  Vitals and nursing note reviewed. Constitutional:       Appearance: Normal appearance. HENT:      Head: Normocephalic and atraumatic. Eyes:      Conjunctiva/sclera: Conjunctivae normal.      Pupils: Pupils are equal, round, and reactive to light.    Pulmonary:      Effort: Pulmonary effort is normal. Musculoskeletal:      Cervical back: Normal range of motion. Left knee: No swelling, effusion or ecchymosis. Normal range of motion. No tenderness. Right ankle: No swelling, deformity, ecchymosis or lacerations. No tenderness. Normal range of motion. Normal pulse. Right Achilles Tendon: Normal.      Comments: Left lower extremity:  There is resolving ecchymosis present throughout the ankle most significant on the lateral aspect. Skin is intact with no lesions or wounds. Tenderness to palpation isolated to the lateral aspect of the ankle centered over the lateral malleolus. No tenderness palpation of the medial malleolus. Normal alignment of the ankle mortise and joint. No instability with gentle passive range of motion and gentle stress examination across the ankle. Sensation and motor function is intact distally at the toes. Foot is warm and well-perfused. Skin is intact with no open wound wounds or lesions. No tenderness to palpation of the proximal tibia or fibula or calf. Mild swelling diffusely throughout the ankle mostly on the lateral   Skin:     General: Skin is warm and dry. Neurological:      Mental Status: He is alert and oriented to person, place, and time. Psychiatric:         Mood and Affect: Mood normal.         Behavior: Behavior normal.            Diagnostic testing:  X-ray images were reviewed by myself and discussed with the patient:  X-ray images of the left ankle taken on 2/14/2022 show evidence of a Ashley B oblique distal fibula fracture with minimal displacement less than 2 mm. There is normal alignment of the ankle mortise and syndesmosis with no widening of the clear spaces at the ankle. Office Procedures:  No orders of the defined types were placed in this encounter. Assessment and Plan  1.   Left ankle minimally displaced Ashley B distal fibula fracture    I reviewed the x-rays with the patient explained that his fracture pattern appears to be stable and that his ankle is well aligned and amenable to nonoperative management. I recommended further protection and immobilization in his fracture boot. I wrapped him with an Ace wrap to help with compression prior to putting the boot on. He can perform weightbearing as tolerated in the boot but I encouraged him to continue using his walker and protecting his weightbearing as pain dictates. I would like him to follow-up in 1 week for repeat x-rays to evaluate for any potential displacement of his fracture site.     Plan will be to proceed with 6 weeks of boot immobilization followed by functional bracing    Electronically signed by Cheyenne Mohr MD on 2/17/2022 at 4:48 PM

## 2022-02-17 NOTE — PATIENT INSTRUCTIONS
May begin weight-bearing as tolerated with the walker. Continue range of motion exercises as instructed. Ice and elevate as needed. Tylenol or Motrin for pain. Follow up in 1 week.

## 2022-02-24 ENCOUNTER — OFFICE VISIT (OUTPATIENT)
Dept: ORTHOPEDIC SURGERY | Age: 82
End: 2022-02-24
Payer: MEDICARE

## 2022-02-24 VITALS — WEIGHT: 210 LBS | BODY MASS INDEX: 30.06 KG/M2 | HEIGHT: 70 IN | RESPIRATION RATE: 16 BRPM

## 2022-02-24 DIAGNOSIS — S82.65XD CLOSED NONDISPLACED FRACTURE OF LATERAL MALLEOLUS OF LEFT FIBULA WITH ROUTINE HEALING, SUBSEQUENT ENCOUNTER: Primary | ICD-10-CM

## 2022-02-24 PROCEDURE — 99212 OFFICE O/P EST SF 10 MIN: CPT

## 2022-02-24 NOTE — PROGRESS NOTES
2/24/2022   Chief Complaint   Patient presents with    Fracture     left ankle        History of Present Illness:                             Luke Garland is a 78 y.o. male returning to the office today for continued monitoring of his left distal fibula fracture. Patient patient is being closely monitored with repeat imaging to pursue a nonsurgical course of management for his ankle fracture. The purpose of today's visit is to confirm his ability to maintain the mortise joint of the ankle without interval changes since his previous appointment with Dr. Raegan Alvarado on 2/17/2022. Patient states his pain symptoms have not changed a great deal since his prior visit and that he is taking it slow walking with his boot with the aid of a walker. Patient understands that it is very important for him to utilize his boot and the nonoperative management of his ankle fracture and that failure to do so puts at risk the healing process. Medical History  Patient's medications, allergies, past medical, surgical, social and family histories were reviewed and updated as appropriate. Review of Systems   Musculoskeletal: Positive for arthralgias, gait problem and joint swelling. Negative for back pain, myalgias, neck pain and neck stiffness. Examination:  General Exam:  Vitals: Resp 16   Ht 5' 10\" (1.778 m)   Wt 210 lb (95.3 kg)   BMI 30.13 kg/m²    Physical Exam  Constitutional:       Appearance: Normal appearance. He is normal weight. HENT:      Head: Normocephalic and atraumatic. Nose: No rhinorrhea. Eyes:      General: No scleral icterus. Right eye: No discharge. Left eye: No discharge. Pulmonary:      Effort: Pulmonary effort is normal. No respiratory distress. Musculoskeletal:      Cervical back: Normal range of motion.       Comments: Physical exam of left ankle: I did not conduct a formal physical exam of left ankle as it was secured in a boot and patient stated his condition was unchanged since prior visit. The purpose of his visit today is to conduct repeat imaging to assess interval change of his distal fibular fracture to make a determination on continued nonsurgical approach. Neurological:      General: No focal deficit present. Mental Status: He is alert and oriented to person, place, and time. Psychiatric:         Mood and Affect: Mood normal.         Behavior: Behavior normal.              Diagnostic testing:  X-rays reviewed in office, I independently reviewed the films in the office today:     Impression   No change in the distal fibular fracture.             Office Procedures:  No orders of the defined types were placed in this encounter. Assessment and Plan  1. Distal fibular fracture, left  -Patient educated that his distal fibular fracture has not shifted in any way since his prior x-rays. We discussed how this is a good sign for a continued nonsurgical approach. I explained to him that he can continue utilizing his walking boot and a walker around his house. I commended him on his efforts to take things slow when going up and down stairs.  -Patient scheduled to follow-up in 2 weeks to assess interval change on imaging and at that time we can begin evaluating his ability to work on mild stretching and make a determination on when to implement physical therapy to begin building strength.           Electronically signed by Jaylin Jose PA-C on 2/24/2022 at 2:49 PM

## 2022-02-25 ASSESSMENT — ENCOUNTER SYMPTOMS: BACK PAIN: 0

## 2022-02-28 ENCOUNTER — TELEPHONE (OUTPATIENT)
Dept: NEUROLOGY | Age: 82
End: 2022-02-28

## 2022-02-28 NOTE — TELEPHONE ENCOUNTER
Called and spoke to Zeus Chappell about getting on the schedule for a Botox consult and he states he is not interested at this time. He states they did not help in the past and does not wish to try them again.

## 2022-03-07 ENCOUNTER — TELEPHONE (OUTPATIENT)
Dept: INTERNAL MEDICINE CLINIC | Age: 82
End: 2022-03-07

## 2022-03-07 NOTE — TELEPHONE ENCOUNTER
----- Message from Scarlett Azevedo sent at 3/7/2022 11:48 AM EST -----  Subject: Medication Problem    QUESTIONS  Name of Medication? MAGNESIUM CHLORIDE PO  Patient-reported dosage and instructions? Take 1 each by mouth daily -   instructed; taking 2 a day  What question or problem do you have with the medication? Patient's wife,   Nazario Dubois is requesting callback from PCP Kirk Verduzco   ([id number]) in regards to Magnesium inquiry about dosage and the correct   magnesium that they should both be taking due to multiple availability   Preferred Pharmacy? [id number] [place], [facility] [address]  Pharmacy phone number (if available)? [phone]  Additional Information for Provider?   ---------------------------------------------------------------------------  --------------  [address]  What is the best way for the office to contact you? OK to leave message on   voicemail  Preferred Call Back Phone Number? [phone]  ---------------------------------------------------------------------------  --------------  SCRIPT ANSWERS  Relationship to Patient? Other  Representative Name? Nazario Dubois - wife  Is the Representative on the appropriate HIPAA document in Epic?  Yes

## 2022-03-07 NOTE — TELEPHONE ENCOUNTER
I'm not sure who prescribed this or why as I don't see any magnesium labs (so I can't tell if he is deficient). If he just taking as a daily supplement, he needs to make sure not to take any more than 350mg a day. Thanks.

## 2022-03-09 ENCOUNTER — NURSE ONLY (OUTPATIENT)
Dept: INTERNAL MEDICINE CLINIC | Age: 82
End: 2022-03-09
Payer: MEDICARE

## 2022-03-09 VITALS — TEMPERATURE: 97 F

## 2022-03-09 DIAGNOSIS — Z79.01 LONG TERM CURRENT USE OF ANTICOAGULANT THERAPY: Primary | ICD-10-CM

## 2022-03-09 LAB
INTERNATIONAL NORMALIZATION RATIO, POC: 2.4
PROTHROMBIN TIME, POC: 28.3

## 2022-03-09 PROCEDURE — 85610 PROTHROMBIN TIME: CPT | Performed by: FAMILY MEDICINE

## 2022-03-09 PROCEDURE — 99211 OFF/OP EST MAY X REQ PHY/QHP: CPT | Performed by: FAMILY MEDICINE

## 2022-03-09 NOTE — PROGRESS NOTES
Patient came in to have INR checked. States he takes 2 mg daily. Per PCP: 03/09/22- Pt is to take 2 mg all days of the week. Recheck INR in 6 weeks.

## 2022-03-10 ENCOUNTER — OFFICE VISIT (OUTPATIENT)
Dept: ORTHOPEDIC SURGERY | Age: 82
End: 2022-03-10
Payer: MEDICARE

## 2022-03-10 VITALS
OXYGEN SATURATION: 92 % | WEIGHT: 210 LBS | HEIGHT: 70 IN | HEART RATE: 66 BPM | RESPIRATION RATE: 16 BRPM | BODY MASS INDEX: 30.06 KG/M2

## 2022-03-10 DIAGNOSIS — S82.65XD CLOSED NONDISPLACED FRACTURE OF LATERAL MALLEOLUS OF LEFT FIBULA WITH ROUTINE HEALING, SUBSEQUENT ENCOUNTER: Primary | ICD-10-CM

## 2022-03-10 PROCEDURE — 99213 OFFICE O/P EST LOW 20 MIN: CPT

## 2022-03-10 NOTE — PATIENT INSTRUCTIONS
Continue weight-bearing as tolerated. Continue range of motion exercises as instructed. Ice and elevate as needed. Tylenol or Motrin for pain. Transition to ankle brace today. Wear brace for short distances and Boot for longer distances. Follow up in 4 weeks. Patient Education        Ankle: Exercises  Introduction  Here are some examples of exercises for you to try. The exercises may be suggested for a condition or for rehabilitation. Start each exercise slowly. Ease off the exercises if you start to have pain. You will be told when to start these exercises and which ones will work best for you. How to do the exercises  'Alphabet' exercise    1. Trace the alphabet with your toe. This helps your ankle move in all directions. Side-to-side knee swing exercise    1. Sit in a chair with your foot flat on the floor. 2. Slowly move your knee from side to side while keeping your foot pressed flat. 3. Continue this exercise for 2 to 3 minutes. Towel curl    1. While sitting, place your foot on a towel on the floor and scrunch the towel toward you with your toes. 2. Then use your toes to push the towel away from you. 3. Make this exercise more challenging by placing a weighted object, such as a soup can, on the other end of the towel. Towel stretch    1. Sit with your legs extended and knees straight. 2. Place a towel around your foot just under the toes. 3. Hold each end of the towel in each hand, with your hands above your knees. 4. Pull back with the towel so that your foot stretches toward you. 5. Hold the position for at least 15 to 30 seconds. 6. Repeat 2 to 4 times a session, up to 5 sessions a day. Ankle eversion exercise    1. Start by sitting with your foot flat on the floor and pushing it outward against an immovable object such as the wall or heavy furniture. Hold for about 6 seconds, then relax. Repeat 8 to 12 times.   2. After you feel comfortable with this, try using rubber tubing looped around the outside of your feet for resistance. Push your foot out to the side against the tubing, and then count to 10 as you slowly bring your foot back to the middle. Repeat 8 to 12 times. Isometric opposition exercises    1. While sitting, put your feet together flat on the floor. 2. Press your injured foot inward against your other foot. Hold for about 6 seconds, and relax. Repeat 8 to 12 times. 3. Then place the heel of your other foot on top of the injured one. Push down with the top heel while trying to push up with your injured foot. Hold for about 6 seconds, and relax. Repeat 8 to 12 times. Follow-up care is a key part of your treatment and safety. Be sure to make and go to all appointments, and call your doctor if you are having problems. It's also a good idea to know your test results and keep a list of the medicines you take. Where can you learn more? Go to https://Dime.[employer]. org and sign in to your [employer] account. Enter Z529 in the Stealz box to learn more about \"Ankle: Exercises. \"     If you do not have an account, please click on the \"Sign Up Now\" link. Current as of: July 1, 2021               Content Version: 13.1  © 2006-2021 [employer]. Care instructions adapted under license by [facility] ([facility]). If you have questions about a medical condition or this instruction, always ask your healthcare professional. [place] [address] any warranty or liability for your use of this information.

## 2022-03-10 NOTE — PROGRESS NOTES
Pt returns to the office today for a follow up on his L ankle fx. Pt is currently using his boot and states that he has no pain today. Pt states he occasionally has sharp pain along the lateral aspect of his leg and reports this has been going on the past couple of weeks. Pt denies any new injuries or accidents. Pt is doing well otherwise.

## 2022-03-14 ENCOUNTER — TELEMEDICINE (OUTPATIENT)
Dept: INTERNAL MEDICINE CLINIC | Age: 82
End: 2022-03-14
Payer: MEDICARE

## 2022-03-14 DIAGNOSIS — Z00.00 MEDICARE ANNUAL WELLNESS VISIT, SUBSEQUENT: Primary | ICD-10-CM

## 2022-03-14 PROCEDURE — G8484 FLU IMMUNIZE NO ADMIN: HCPCS | Performed by: FAMILY MEDICINE

## 2022-03-14 PROCEDURE — 1123F ACP DISCUSS/DSCN MKR DOCD: CPT | Performed by: FAMILY MEDICINE

## 2022-03-14 PROCEDURE — 4040F PNEUMOC VAC/ADMIN/RCVD: CPT | Performed by: FAMILY MEDICINE

## 2022-03-14 PROCEDURE — G0439 PPPS, SUBSEQ VISIT: HCPCS | Performed by: FAMILY MEDICINE

## 2022-03-14 RX ORDER — WARFARIN SODIUM 2 MG/1
TABLET ORAL
Qty: 90 TABLET | Refills: 1 | Status: SHIPPED | OUTPATIENT
Start: 2022-03-14 | End: 2022-06-20

## 2022-03-14 RX ORDER — GABAPENTIN 100 MG/1
CAPSULE ORAL
Qty: 180 CAPSULE | Refills: 1 | Status: SHIPPED | OUTPATIENT
Start: 2022-03-14 | End: 2022-03-17 | Stop reason: SDUPTHER

## 2022-03-14 ASSESSMENT — PATIENT HEALTH QUESTIONNAIRE - PHQ9
SUM OF ALL RESPONSES TO PHQ QUESTIONS 1-9: 0
SUM OF ALL RESPONSES TO PHQ QUESTIONS 1-9: 0
1. LITTLE INTEREST OR PLEASURE IN DOING THINGS: 0
SUM OF ALL RESPONSES TO PHQ9 QUESTIONS 1 & 2: 0
2. FEELING DOWN, DEPRESSED OR HOPELESS: 0
SUM OF ALL RESPONSES TO PHQ QUESTIONS 1-9: 0
SUM OF ALL RESPONSES TO PHQ QUESTIONS 1-9: 0

## 2022-03-14 ASSESSMENT — LIFESTYLE VARIABLES: HOW OFTEN DO YOU HAVE A DRINK CONTAINING ALCOHOL: NEVER

## 2022-03-14 NOTE — PATIENT INSTRUCTIONS
Personalized Preventive Plan for Guadalupe Abrams - 3/14/2022  Medicare offers a range of preventive health benefits. Some of the tests and screenings are paid in full while other may be subject to a deductible, co-insurance, and/or copay. Some of these benefits include a comprehensive review of your medical history including lifestyle, illnesses that may run in your family, and various assessments and screenings as appropriate. After reviewing your medical record and screening and assessments performed today your provider may have ordered immunizations, labs, imaging, and/or referrals for you. A list of these orders (if applicable) as well as your Preventive Care list are included within your After Visit Summary for your review. Other Preventive Recommendations:    · A preventive eye exam performed by an eye specialist is recommended every 1-2 years to screen for glaucoma; cataracts, macular degeneration, and other eye disorders. · A preventive dental visit is recommended every 6 months. · Try to get at least 150 minutes of exercise per week or 10,000 steps per day on a pedometer . · Order or download the FREE \"Exercise & Physical Activity: Your Everyday Guide\" from [employer]. Call 3-901.628.2497 or search [employer] online. · You need 7113-4590 mg of calcium and 2227-4069 IU of vitamin D per day. It is possible to meet your calcium requirement with diet alone, but a vitamin D supplement is usually necessary to meet this goal.  · When exposed to the sun, use a sunscreen that protects against both UVA and UVB radiation with an SPF of 30 or greater. Reapply every 2 to 3 hours or after sweating, drying off with a towel, or swimming. · Always wear a seat belt when traveling in a car. Always wear a helmet when riding a bicycle or motorcycle.

## 2022-03-14 NOTE — PROGRESS NOTES
Medicare Annual Wellness Visit    Lorena Manrique is here for Medicare AWV    Assessment & Plan   Medicare annual wellness visit, subsequent      Recommendations for Preventive Services Due: see orders and patient instructions/AVS.  Recommended screening schedule for the next 5-10 years is provided to the patient in written form: see Patient Instructions/AVS.     No follow-ups on file. Subjective       Patient's complete Health Risk Assessment and screening values have been reviewed and are found in Flowsheets. The following problems were reviewed today and where indicated follow up appointments were made and/or referrals ordered. Positive Risk Factor Screenings with Interventions:    Fall Risk:  Do you feel unsteady or are you worried about falling? : no  2 or more falls in past year?: no  Fall with injury in past year?: (!) yes (broke ankle, fell on icy steps, states he is recovering)     Fall Risk Interventions:    · Patient declines any further evaluation/treatment for this issue, states he slipped on icy stairs and broke his ankle, states he is \"mending\" now.       Tobacco Use:     Tobacco Use: High Risk    Smoking Tobacco Use: Current Every Day Smoker    Smokeless Tobacco Use: Never Used     E-Cigarettes/Vaping Use     Questions Responses    E-Cigarette/Vaping Use Never User    Start Date     Passive Exposure     Quit Date     Counseling Given     Comments         Substance Abuse - Tobacco Interventions:  patient is not ready to work toward tobacco cessation at this time          Health Habits/Nutrition:     Physical Activity: Inactive    Days of Exercise per Week: 0 days    Minutes of Exercise per Session: 0 min     Have you lost any weight without trying in the past 3 months?: No     Have you seen the dentist within the past year?: N/A - wear dentures    Health Habits/Nutrition Interventions:  · Inadequate physical activity:  patient is not ready to increase his/her physical activity level at this time  · Nutritional issues:  patient is not ready to address his/her nutritional/weight issues at this time    Hearing/Vision:  Do you or your family notice any trouble with your hearing that hasn't been managed with hearing aids?: (!) Yes  Do you have difficulty driving, watching TV, or doing any of your daily activities because of your eyesight?: No  Have you had an eye exam within the past year?: (!) No  No exam data present    Hearing/Vision Interventions:  · Hearing concerns:  patient declines any further evaluation/treatment for hearing issues  · Vision concerns:  patient encouraged to make appointment with his/her eye specialist            Objective      Patient-Reported Vitals  No data recorded     Unable to obtain 3 vital signs due to patient not having equipment to take blood pressure/temperature. Allergies   Allergen Reactions    Other      \"Allergic To All Cholesterol Medications Causing Muscles To Ache\"    Atorvastatin      Patient states he is allergic to all cholesterol medications    Flomax [Tamsulosin Hcl] Other (See Comments)     Headache    Proscar [Finasteride] Other (See Comments)     Headache     Prior to Visit Medications    Medication Sig Taking?  Authorizing Provider   trihexyphenidyl (ARTANE) 2 MG tablet TAKE 1 TABLET BY MOUTH EVERY DAY Yes Geovany Lagunas MD   warfarin (JANTOVEN) 2 MG tablet  Yes Historical Provider, MD   amLODIPine (NORVASC) 10 MG tablet TAKE 1 TABLET BY MOUTH EVERY DAY Yes Geovany Lagunas MD   levothyroxine (SYNTHROID) 25 MCG tablet Take 1 tablet by mouth Daily Yes Geovany Lagunas MD   alirocumab (PRALUENT) 75 MG/ML SOAJ injection pen Inject 1 mL into the skin every 14 days Yes Geovany Lagunas MD   rOPINIRole (REQUIP) 0.25 MG tablet Take 1 tablet by mouth daily  Patient taking differently: Take 0.25 mg by mouth nightly  Yes Antolin Landry MD   metoprolol succinate (TOPROL XL) 25 MG extended release tablet  Yes Historical Provider, MD fluticasone-salmeterol (ADVAIR HFA) 230-21 MCG/ACT inhaler Inhale 2 puffs into the lungs 2 times daily Yes Kimberly Gee MD   famotidine (PEPCID) 20 MG tablet Take 0.5 tablets by mouth 2 times daily Yes Kimberly Gee MD   zonisamide (ZONEGRAN) 100 MG capsule TAKE 2 CAPSULES BY MOUTH AT BEDTIME Yes Historical Provider, MD   warfarin (COUMADIN) 2 MG tablet Take 1 tab by mouth daily Yes Kimberly Gee MD   trimethoprim (TRIMPEX) 100 MG tablet  Yes Historical Provider, MD   UNABLE TO FIND GSH-3 Cell Defense Yes Historical Provider, MD   Saw Crane, Serenoa repens, (SAW PALMETTO BERRY PO) Take by mouth Yes Historical Provider, MD   MAGNESIUM CHLORIDE PO Take 1 each by mouth daily  Yes Historical Provider, MD   aspirin 81 MG EC tablet Take 1 tablet by mouth daily Yes Ishaan Aquino MD   Multiple Vitamins-Minerals (CENTRUM PO) Take 1 tablet by mouth nightly Over The Counter  Yes Historical Provider, MD   Cholecalciferol (VITAMIN D PO) Take 2,000 Units by mouth 2 times daily Over The Counter  Yes Historical Provider, MD   gabapentin (NEURONTIN) 100 MG capsule Take one pill in AM and 1-3 pills at dinner time  ORVILLE Mcdonald - NP   onabotulinumtoxin A (BOTOX) 100 units injection Botox 100 unit injection   Take 100 units by injection route.   Patient not taking: Reported on 3/10/2022  Historical Provider, MD Olsen (Including outside providers/suppliers regularly involved in providing care):   Patient Care Team:  Kimberly Gee MD as PCP - General (Family Medicine)  Kimberly Gee [facility]  Gloria Curry MD as Consulting Physician (Pulmonology)  Kelsey Puente MD as Consulting Physician (Cardiology)  Ishaan Aquino MD as Consulting Physician (Cardiology)    Reviewed and updated this visit:  Tobacco  Allergies  Meds  Med Hx  Surg Hx  Soc Hx  Fam Hx           Oliyl Amanda, was evaluated through a synchronous (real-time)

## 2022-03-15 ASSESSMENT — ENCOUNTER SYMPTOMS
SHORTNESS OF BREATH: 0
RHINORRHEA: 0
FACIAL SWELLING: 0
BACK PAIN: 0
COUGH: 0
NAUSEA: 0

## 2022-03-15 NOTE — PROGRESS NOTES
3/10/2022   Chief Complaint   Patient presents with    Ankle Pain     Left ankle Fx         History of Present Illness:                             Gato Hurtado is a 78 y.o. male returns the office today for continued management of his left ankle lateral malleolus fracture. Patient states he is doing very well and has very minimal pain. His only concern is a pain that is traveling up his leg on the lateral side, though he understands this may be from the use of his walking boot. Patient denies any new accidents or falls. Patient denies temperature changes in his foot or paresthesias in his ankle, foot, or toes. Pt returns to the office today for a follow up on his L ankle fx. Pt is currently using his boot and states that he has no pain today. Pt states he occasionally has sharp pain along the lateral aspect of his leg and reports this has been going on the past couple of weeks. Pt denies any new injuries or accidents. Pt is doing well otherwise. Medical History  Patient's medications, allergies, past medical, surgical, social and family histories were reviewed and updated as appropriate. Review of Systems   Constitutional: Negative for fever. HENT: Negative for facial swelling and rhinorrhea. Respiratory: Negative for cough and shortness of breath. Cardiovascular: Negative for chest pain. Gastrointestinal: Negative for nausea. Musculoskeletal: Positive for arthralgias and gait problem. Negative for back pain, joint swelling, myalgias, neck pain and neck stiffness. Skin: Negative for pallor and rash. Neurological: Negative for facial asymmetry and speech difficulty. Psychiatric/Behavioral: Negative for agitation and confusion.                                                Examination:  General Exam:  Vitals: Pulse 66   Resp 16   Ht 5' 10\" (1.778 m)   Wt 210 lb (95.3 kg)   SpO2 92%   BMI 30.13 kg/m²    Physical Exam  Constitutional:       General: He is not in acute distress. Appearance: Normal appearance. He is not ill-appearing. HENT:      Head: Normocephalic and atraumatic. Nose: No rhinorrhea. Eyes:      General: No scleral icterus. Right eye: No discharge. Left eye: No discharge. Cardiovascular:      Pulses: Normal pulses. Pulmonary:      Effort: Pulmonary effort is normal. No respiratory distress. Breath sounds: No stridor. Musculoskeletal:      Cervical back: Normal range of motion. Left lower leg: Tenderness present. No swelling, deformity, lacerations or bony tenderness. No edema. Left ankle: Swelling present. No deformity, ecchymosis or lacerations. Tenderness present over the lateral malleolus. No medial malleolus tenderness. Decreased range of motion. Anterior drawer test negative. Normal pulse. Comments: Left lower extremity exam: Mild swelling around the ankle joint on the lateral side near the lateral malleolus. Significant decrease in bruising from previous visit. Some generalized tenderness along the lateral border of the joshua and calf. Liana Fabius' sign negative. Positive for point tenderness on the lateral malleolus. Anterior drawer negative. 2+ pedal pulses, brisk capillary refill. Skin:     General: Skin is warm and dry. Coloration: Skin is not jaundiced. Neurological:      General: No focal deficit present. Mental Status: He is alert and oriented to person, place, and time. Psychiatric:         Mood and Affect: Mood normal.         Behavior: Behavior normal.              Diagnostic testing:  X-rays reviewed in office, I independently reviewed the films in the office today:     Impression   Healing fracture                 Office Procedures:  No orders of the defined types were placed in this encounter. Assessment and Plan  1.   Closed lateral malleolus fracture  -Patient encouraged that his fracture appears to be healing with no interval change to alignment of his fracture line and the mortise joint. Explained to him that he is done an incredible job protecting his fracture and being disciplined and wearing his boot. I instructed them that this is the time to transition to a lace up ankle brace. This transition was explained with an emphasis on a transitional phase such that he can continue to use his walking boot for long distances, being out in public, doing light outdoor work. Then he can utilize an ankle brace when around the home and for going short distances. This balance of a transitional phase can within the next 4 weeks turn into him utilizing the ankle brace for the majority of his walking even outside and for long distances.  -Patient scheduled for a follow-up appointment in 4 weeks to assess his tolerance of the ankle brace transition. Repeat images will be taken at that time and a determination on his need for physical therapy will also be made at that time.         Electronically signed by Delmy Hedrick PA-C on 3/15/2022 at 8:10 AM

## 2022-03-17 ENCOUNTER — TELEPHONE (OUTPATIENT)
Dept: INTERNAL MEDICINE CLINIC | Age: 82
End: 2022-03-17

## 2022-03-17 RX ORDER — GABAPENTIN 100 MG/1
100 CAPSULE ORAL 4 TIMES DAILY
Qty: 360 CAPSULE | Refills: 1 | Status: SHIPPED | OUTPATIENT
Start: 2022-03-17 | End: 2022-04-21 | Stop reason: SDUPTHER

## 2022-03-17 NOTE — TELEPHONE ENCOUNTER
----- Message from Celina Elisabeth sent at 3/17/2022  8:27 AM EDT -----  Subject: Medication Problem    QUESTIONS  Name of Medication? gabapentin (NEURONTIN) 100 MG capsule  Patient-reported dosage and instructions? 100 MG four daily   What question or problem do you have with the medication? Ruthy (wife)   states that the wrong amount of pills was sent over to the pharmacy 180   capsules where sent and 380 capsules are needed. Ruthy also states that the   dosage instructions are also incorrect per Tereso Fernandez (Neurologists). Preferred Pharmacy? [id number] [address], [address] [facility] [phone]  Pharmacy phone number (if available)? [phone]  Additional Information for Provider? Per the patients neurologist the   patient should be taking the medication 4X's daily Ruthy states. Please call   patient to advise.  ---------------------------------------------------------------------------  --------------  CALL BACK INFO  What is the best way for the office to contact you? OK to leave message on   voicemail  Preferred Call Back Phone Number? [phone]  ---------------------------------------------------------------------------  --------------  SCRIPT ANSWERS  Relationship to Patient? Other  Representative Name? Kaylene Hernandez  Is the Representative on the appropriate HIPAA document in Epic?  Yes

## 2022-03-18 NOTE — TELEPHONE ENCOUNTER
Spoke with the pharmacy the gabapentin 2 x's a day cancelled. The new prescription gabapentin 4x's a day will be ready in an hour. Notified patient wife.

## 2022-03-21 ENCOUNTER — TELEPHONE (OUTPATIENT)
Dept: NEUROLOGY | Age: 82
End: 2022-03-21

## 2022-04-07 ENCOUNTER — OFFICE VISIT (OUTPATIENT)
Dept: ORTHOPEDIC SURGERY | Age: 82
End: 2022-04-07
Payer: MEDICARE

## 2022-04-07 VITALS
OXYGEN SATURATION: 97 % | HEIGHT: 70 IN | BODY MASS INDEX: 30.06 KG/M2 | HEART RATE: 92 BPM | WEIGHT: 210 LBS | RESPIRATION RATE: 16 BRPM

## 2022-04-07 DIAGNOSIS — S82.65XD CLOSED NONDISPLACED FRACTURE OF LATERAL MALLEOLUS OF LEFT FIBULA WITH ROUTINE HEALING, SUBSEQUENT ENCOUNTER: Primary | ICD-10-CM

## 2022-04-07 PROCEDURE — 99213 OFFICE O/P EST LOW 20 MIN: CPT

## 2022-04-07 NOTE — PROGRESS NOTES
Annalise Gonzalez is a 78 y.o. male returning to the office to follow up on a left ankle fracture that occurred on 2.8.22

## 2022-04-07 NOTE — PATIENT INSTRUCTIONS
Continue weight-bearing as tolerated. Wear your brace when working outside  Continue range of motion exercises as instructed. Ice and elevate as needed. Tylenol or Motrin for pain. Finish your therapy   Follow up as needed  Patient Education        Ankle: Exercises  Introduction  Here are some examples of exercises for you to try. The exercises may be suggested for a condition or for rehabilitation. Start each exercise slowly. Ease off the exercises if you start to have pain. You will be told when to start these exercises and which ones will work bestfor you. How to do the exercises  'Alphabet' exercise    1. Trace the alphabet with your toe. This helps your ankle move in all directions. Side-to-side knee swing exercise    1. Sit in a chair with your foot flat on the floor. 2. Slowly move your knee from side to side while keeping your foot pressed flat. 3. Continue this exercise for 2 to 3 minutes. Towel curl    1. While sitting, place your foot on a towel on the floor and scrunch the towel toward you with your toes. 2. Then use your toes to push the towel away from you. 3. Make this exercise more challenging by placing a weighted object, such as a soup can, on the other end of the towel. Towel stretch    1. Sit with your legs extended and knees straight. 2. Place a towel around your foot just under the toes. 3. Hold each end of the towel in each hand, with your hands above your knees. 4. Pull back with the towel so that your foot stretches toward you. 5. Hold the position for at least 15 to 30 seconds. 6. Repeat 2 to 4 times a session, up to 5 sessions a day. Ankle eversion exercise    1. Start by sitting with your foot flat on the floor and pushing it outward against an immovable object such as the wall or heavy furniture. Hold for about 6 seconds, then relax. Repeat 8 to 12 times.   2. After you feel comfortable with this, try using rubber tubing looped around the outside of your feet for resistance. Push your foot out to the side against the tubing, and then count to 10 as you slowly bring your foot back to the middle. Repeat 8 to 12 times. Isometric opposition exercises    1. While sitting, put your feet together flat on the floor. 2. Press your injured foot inward against your other foot. Hold for about 6 seconds, and relax. Repeat 8 to 12 times. 3. Then place the heel of your other foot on top of the injured one. Push down with the top heel while trying to push up with your injured foot. Hold for about 6 seconds, and relax. Repeat 8 to 12 times. Follow-up care is a key part of your treatment and safety. Be sure to make and go to all appointments, and call your doctor if you are having problems. It's also a good idea to know your test results and keep alist of the medicines you take. Where can you learn more? Go to https://[employer]. org and sign in to your [employer] account. Enter T424 in the [employer] box to learn more about \"Ankle: Exercises. \"     If you do not have an account, please click on the \"Sign Up Now\" link. Current as of: July 1, 2021               Content Version: 13.2  © 2006-2022 Healthwise[employer] Inc[employer]. Care instructions adapted under license by [facility] [facility] If you have questions about a medical condition or this instruction, always ask your healthcare professional. [place] [address] any warranty or liability for your use of this information.

## 2022-04-09 ASSESSMENT — ENCOUNTER SYMPTOMS
NAUSEA: 0
SHORTNESS OF BREATH: 0
BACK PAIN: 0
FACIAL SWELLING: 0
RHINORRHEA: 0
COUGH: 0

## 2022-04-20 DIAGNOSIS — R73.03 PREDIABETES: ICD-10-CM

## 2022-04-20 DIAGNOSIS — E03.4 HYPOTHYROIDISM DUE TO ACQUIRED ATROPHY OF THYROID: ICD-10-CM

## 2022-04-20 LAB — TSH SERPL DL<=0.05 MIU/L-ACNC: 4.52 UIU/ML (ref 0.27–4.2)

## 2022-04-21 ENCOUNTER — OFFICE VISIT (OUTPATIENT)
Dept: NEUROLOGY | Age: 82
End: 2022-04-21
Payer: MEDICARE

## 2022-04-21 VITALS
OXYGEN SATURATION: 98 % | SYSTOLIC BLOOD PRESSURE: 124 MMHG | HEIGHT: 70 IN | WEIGHT: 208 LBS | DIASTOLIC BLOOD PRESSURE: 76 MMHG | BODY MASS INDEX: 29.78 KG/M2 | HEART RATE: 62 BPM

## 2022-04-21 DIAGNOSIS — I63.9 MULTIPLE CEREBRAL INFARCTIONS (HCC): ICD-10-CM

## 2022-04-21 DIAGNOSIS — Z95.0 CARDIAC PACEMAKER IN SITU: ICD-10-CM

## 2022-04-21 DIAGNOSIS — G25.2 RESTING TREMOR: Primary | ICD-10-CM

## 2022-04-21 DIAGNOSIS — G25.3 MYOCLONUS: ICD-10-CM

## 2022-04-21 DIAGNOSIS — R25.1 TREMOR: ICD-10-CM

## 2022-04-21 DIAGNOSIS — I48.20 CHRONIC ATRIAL FIBRILLATION (HCC): ICD-10-CM

## 2022-04-21 LAB
ESTIMATED AVERAGE GLUCOSE: 139.9 MG/DL
HBA1C MFR BLD: 6.5 %

## 2022-04-21 PROCEDURE — 1123F ACP DISCUSS/DSCN MKR DOCD: CPT | Performed by: NURSE PRACTITIONER

## 2022-04-21 PROCEDURE — 4040F PNEUMOC VAC/ADMIN/RCVD: CPT | Performed by: NURSE PRACTITIONER

## 2022-04-21 PROCEDURE — 4004F PT TOBACCO SCREEN RCVD TLK: CPT | Performed by: NURSE PRACTITIONER

## 2022-04-21 PROCEDURE — G8417 CALC BMI ABV UP PARAM F/U: HCPCS | Performed by: NURSE PRACTITIONER

## 2022-04-21 PROCEDURE — G8427 DOCREV CUR MEDS BY ELIG CLIN: HCPCS | Performed by: NURSE PRACTITIONER

## 2022-04-21 PROCEDURE — 99214 OFFICE O/P EST MOD 30 MIN: CPT | Performed by: NURSE PRACTITIONER

## 2022-04-21 RX ORDER — GABAPENTIN 100 MG/1
200 CAPSULE ORAL 3 TIMES DAILY
Qty: 540 CAPSULE | Refills: 0 | Status: SHIPPED | OUTPATIENT
Start: 2022-04-21 | End: 2022-07-18

## 2022-04-21 NOTE — PROGRESS NOTES
4/21/22    Artice Sandhoff [place]  1940    Chief Complaint   Patient presents with    Follow-up     Tremors are stable and thinks the gabapentin is really helping       History of Present Illness  Army Mcgee is a 78 y.o. male presenting today for follow-up of: RLE tremor/myoclonus. Army Mcgee remains on ropinirole 0.25 mg taking 1 tablet at bedtime. He is also on zonisamide 200 mg at bedtime and gabapentin 200 mg AM, 100 mg lunch and 100 mg supper. Army Mcgee had been getting Botox injections for myoclonus with Dr. Bismark Escobar, last injection was in November 2021 however he has never felt this has been very beneficial and tells me today he is no longer interested in further Botox injections. He does tell me that the dose increase that we did on last visit with the gabapentin has helped tremendously with his tremor/myoclonus. He tells me that this is the best he has been in a very long time but feels there is still room for little more improvement and would like to increase the dose a little further. He is tolerating well. On last visit a MRI of his lumbar spine was ordered to rule out any possible etiology for the myoclonus and right lower extremity, possibly spinal myoclonus. Army Mcgee does have a pacemaker and this was not compatible with MRI in [facility], a new order was sent to [facility] radiology however it appears MRI was not completed as his pacemaker was not compatible either. Current Outpatient Medications   Medication Sig Dispense Refill    gabapentin (NEURONTIN) 100 MG capsule Take 1 capsule by mouth 4 times daily for 180 days.  360 capsule 1    warfarin (COUMADIN) 2 MG tablet TAKE 1 TABLET BY MOUTH EVERY DAY 90 tablet 1    trihexyphenidyl (ARTANE) 2 MG tablet TAKE 1 TABLET BY MOUTH EVERY DAY 90 tablet 1    amLODIPine (NORVASC) 10 MG tablet TAKE 1 TABLET BY MOUTH EVERY DAY 90 tablet 3    levothyroxine (SYNTHROID) 25 MCG tablet Take 1 tablet by mouth Daily 90 tablet 3    alirocumab (PRALUENT) 75 MG/ML SOAJ injection pen Inject 1 mL into the skin every 14 days 6 pen 1    rOPINIRole (REQUIP) 0.25 MG tablet Take 1 tablet by mouth daily (Patient taking differently: Take 0.25 mg by mouth nightly ) 90 tablet 3    metoprolol succinate (TOPROL XL) 25 MG extended release tablet       onabotulinumtoxin A (BOTOX) 100 units injection Botox 100 unit injection   Take 100 units by injection route. (Patient not taking: Reported on 3/10/2022)      fluticasone-salmeterol (ADVAIR HFA) 230-21 MCG/ACT inhaler Inhale 2 puffs into the lungs 2 times daily 3 each 3    famotidine (PEPCID) 20 MG tablet Take 0.5 tablets by mouth 2 times daily 180 tablet 1    zonisamide (ZONEGRAN) 100 MG capsule TAKE 2 CAPSULES BY MOUTH AT BEDTIME      trimethoprim (TRIMPEX) 100 MG tablet       UNABLE TO FIND GSH-[employer]      Saw Palmetto, Serenoa repens, (SAW PALMETTO BERRY PO) Take by mouth      MAGNESIUM CHLORIDE PO Take 1 each by mouth daily       aspirin 81 MG EC tablet Take 1 tablet by mouth daily (Patient not taking: Reported on 4/7/2022) 30 tablet 3    Multiple Vitamins-Minerals (CENTRUM PO) Take 1 tablet by mouth nightly Over The Counter       Cholecalciferol (VITAMIN D PO) Take 2,000 Units by mouth 2 times daily Over The Counter        No current facility-administered medications for this visit.        Physical Exam:    Mental Status              Orientation: oriented to person, oriented to place, oriented to problem and oriented to time                        Mood/affectappropriate mood and appropriate affect              Memory/Other: recent memory intact, remote memory intact, fund of knowledge intact, attention span normal and concentration normal  Language  Language: (normal) language, no dysarthria, (normal) articulation and no dysphasia/aphasia  Cranial Nerves              Eyes: pupils normal size and reactive to light and visual fields appear full              CN III, IV, VI : extraocular muscle strength normal, normal pursuit, no nystagmus and no ptosis              Facial Motor: normal facial motor              CN XII: tongue protrudes midline  Motor/Coordination Exam              Power: motor strength appears intact throughout, no arm drift and normal tone              Coordination: normal finger-to-nose, forearm rotation intact and rapid alternating movement normal  Sensory Exam No Bradykinesia, No Dyskindesia, Normal strength, Normal Tone Normal bulk and Normal tone mild weakness in left foot with push, moderate myoclonus RLE with moderate tremor.     Gait and Stance              Gait/Posture: Station normal, casual gait normal, ambulates independently, moderate Romberg's sway  with eyes open and closed, tiptoe abnormal and why was unable to do heel toe walking as he felt unsteady. /76 (Site: Left Upper Arm, Position: Sitting, Cuff Size: Medium Adult)   Pulse 62   Ht 5' 10\" (1.778 m)   Wt 208 lb (94.3 kg)   SpO2 98%   BMI 29.84 kg/m²     Assessment and Plan     Diagnosis Orders   1. Resting tremor     2. Cardiac pacemaker in situ     3. Tremor     4. Myoclonus     5. Multiple cerebral infarctions ([address]     6. Chronic atrial fibrillation (HCC)       I am thrilled that Devyn's tremor/myoclonus is finally well managed and he is very pleased as well. I will increase his gabapentin to 200 mg 3 times daily so Elidia Belcher will have the ability to take an additional 2 pills during the day if needed. He will continue on zonisamide and Requip at current doses, we will not make any changes with these medications today as I do not want to risk a setback with his symptoms. I did discuss with for the possible side effects of the gabapentin as we increase the dose. He will let me know if he notices any lower extremity swelling. Elidia Belcher is most recent creatinine level was 1.8, I did discuss with him that if he begins having an increase in this already mildly elevated level we would then need to pull back on dosing.   We will denies having any chronic kidney issues, he does report having a history of kidney stones. I will plan on following up with her again in 3 months, sooner if the need arises. Medications prescribed for the patient were discussed in detail. This included a discussion of the potential risks versus potential benefits of the medications. The patient was given time to ask questions and these were answered to the best of my ability. The patient appeared to understand the information provided. Return in about 3 months (around 7/21/2022).     ORVILLE Desir NP

## 2022-04-25 ENCOUNTER — OFFICE VISIT (OUTPATIENT)
Dept: INTERNAL MEDICINE CLINIC | Age: 82
End: 2022-04-25
Payer: MEDICARE

## 2022-04-25 VITALS
BODY MASS INDEX: 30.13 KG/M2 | DIASTOLIC BLOOD PRESSURE: 70 MMHG | WEIGHT: 210 LBS | OXYGEN SATURATION: 96 % | SYSTOLIC BLOOD PRESSURE: 129 MMHG | HEART RATE: 66 BPM

## 2022-04-25 DIAGNOSIS — E78.2 MIXED HYPERLIPIDEMIA: ICD-10-CM

## 2022-04-25 DIAGNOSIS — N18.31 STAGE 3A CHRONIC KIDNEY DISEASE (HCC): ICD-10-CM

## 2022-04-25 DIAGNOSIS — E03.4 HYPOTHYROIDISM DUE TO ACQUIRED ATROPHY OF THYROID: ICD-10-CM

## 2022-04-25 DIAGNOSIS — E11.69 TYPE 2 DIABETES MELLITUS WITH OTHER SPECIFIED COMPLICATION, UNSPECIFIED WHETHER LONG TERM INSULIN USE (HCC): ICD-10-CM

## 2022-04-25 DIAGNOSIS — J44.9 MODERATE COPD (CHRONIC OBSTRUCTIVE PULMONARY DISEASE) (HCC): ICD-10-CM

## 2022-04-25 DIAGNOSIS — Z79.01 LONG TERM CURRENT USE OF ANTICOAGULANT THERAPY: Primary | ICD-10-CM

## 2022-04-25 DIAGNOSIS — I15.9 HYPERTENSION, SECONDARY: ICD-10-CM

## 2022-04-25 PROBLEM — R73.03 PREDIABETES: Status: RESOLVED | Noted: 2021-04-08 | Resolved: 2022-04-25

## 2022-04-25 LAB
INTERNATIONAL NORMALIZATION RATIO, POC: 2.2
PROTHROMBIN TIME, POC: 26.2

## 2022-04-25 PROCEDURE — 4040F PNEUMOC VAC/ADMIN/RCVD: CPT | Performed by: FAMILY MEDICINE

## 2022-04-25 PROCEDURE — 1123F ACP DISCUSS/DSCN MKR DOCD: CPT | Performed by: FAMILY MEDICINE

## 2022-04-25 PROCEDURE — 99214 OFFICE O/P EST MOD 30 MIN: CPT | Performed by: FAMILY MEDICINE

## 2022-04-25 PROCEDURE — 85610 PROTHROMBIN TIME: CPT | Performed by: FAMILY MEDICINE

## 2022-04-25 PROCEDURE — 3044F HG A1C LEVEL LT 7.0%: CPT | Performed by: FAMILY MEDICINE

## 2022-04-25 PROCEDURE — G8427 DOCREV CUR MEDS BY ELIG CLIN: HCPCS | Performed by: FAMILY MEDICINE

## 2022-04-25 PROCEDURE — G8417 CALC BMI ABV UP PARAM F/U: HCPCS | Performed by: FAMILY MEDICINE

## 2022-04-25 PROCEDURE — 4004F PT TOBACCO SCREEN RCVD TLK: CPT | Performed by: FAMILY MEDICINE

## 2022-04-25 PROCEDURE — 3023F SPIROM DOC REV: CPT | Performed by: FAMILY MEDICINE

## 2022-04-25 RX ORDER — ALBUTEROL SULFATE 90 UG/1
2 AEROSOL, METERED RESPIRATORY (INHALATION) EVERY 6 HOURS PRN
Qty: 54 G | Refills: 1 | Status: SHIPPED | OUTPATIENT
Start: 2022-04-25

## 2022-04-25 ASSESSMENT — ENCOUNTER SYMPTOMS
SHORTNESS OF BREATH: 0
COLOR CHANGE: 0
SORE THROAT: 0
ABDOMINAL PAIN: 0
CONSTIPATION: 0
VOMITING: 0
CHEST TIGHTNESS: 0
DIARRHEA: 0

## 2022-04-25 NOTE — PROGRESS NOTES
Patient had his INR checked today at his OV. Patient states he takes 2 mg daily. Per PCP: 04/25/22- Pt is to take 2 mg all days of the week. Recheck INR in 6 weeks.

## 2022-04-25 NOTE — PROGRESS NOTES
Subjective:      Chief Complaint   Patient presents with    3 Month Follow-Up       HPI:  Malik Farris is a 78 y.o. male who presents today for follow up of chronic conditions as listed below. INR: 2.2 today. Hypothyroidism:  TSH slightly elevated. Denying any symptoms. Prediabetes:  HbA1c improved to 6.5. States he has switched to no sugar soda, is trying to drink more water and is not adding sugar to his tea. Tremor:  Discontinued botox as he felt it was not helping, but does feel gabapentin has been beneficial.  Dose was increased to 200mg TID last week by neurology. States he has noticed further improvement since increasing dose. COPD:  Reports respiratory symptoms are stable. States he only has advair at home- takes about once a week as needed but usually does not have any breathing problems. Denies wheezing/SOB. Does not see pulmonology. Still smoking 1/2 ppd. Feeling well today, no acute concerns. Labs reviewed.         Past Medical History:   Diagnosis Date    Acid reflux     Adenomatous polyp of colon 1-2009    last colonoscopy recomm 3 yr follow up    Anticoagulated 2010    takes for afib Dr Favio Garcia monitors INR 1.2 on 02/08/2018    Arthritis     \"arthritis in low back\"    CAD (coronary artery disease)     follow with dr Onel Bowman- cardiac clearance for surgery done 10/31/2017 on chart)    Chronic atrial fibrillation (HCC)     Sees Dr. Joyce Cheatham COPD (chronic obstructive pulmonary disease) ([facility] [address]     follows with dr Jojo Wright Diverticulitis     Diverticulosis of colon     left colon    Enlarged prostate     \"had to ream me out a couple of times\":   [facility] H/O cardiac catheterization 1/31/2006    R codominant, RCA patent, LAD patent with minimal nonobstructive irreg, CIRC patent and codominant, RI small but patent    H/O cardiovascular stress test 5/18/10    EF81% decrease in perfusion in inferoseptal segments c/w paced rhythm, LV small in volume    H/O cardiovascular stress test 5/5/2016    lexiscan-normal,EF70%    H/O cardiovascular stress test 6/2/2016    treadmill    H/O Doppler Carotid ultrasound 11/01/2019    Mild (0-49%) disease of the Bilateral proximal Internal carotid artery.  H/O Doppler ultrasound 06/15/2016    minimal plaque throughout bilaterally    H/O echocardiogram 5/3/16    EF 50-55%. Mild concentric LV hypertrophy with low normal systolic function. Mod bilateral atrial and right ventricle dilatation. Mild AR, MR, TR, AK. Absence of pericardial effsion.  H/O echocardiogram 8/4/11    EF(not given in faxed records) mild MR, mod LAE, mild AR    H/O echocardiogram 09/18/2019    EF 55-60%,  The left atrium is moderate to severely dilated.  H/O percutaneous left heart catheterization 5/12/16    left circumflex is 60% very distal stenosis at the bifurcation, OM1 is 99% mid segment stenosis    H/O unstable angina     History of Doppler ultrasound 3/29/12    Abd Ao Duplex - no abdominal aoritic aneurysm or iliac aneurysm identified    History of Doppler ultrasound 10/19/10    carotid - 0-19% bilateral ICAs, R and L carotid system show minimal atheromatous diseassse without stenosis    History of Holter monitoring 10/19/10    rhythm is ventricular paced with underlying a-fib    History of nuclear stress test 02/06/2020    Normal study.     Hx of CT scan of chest 8/17/10    mod-severe centrilobular emphysematous changes    HX OTHER MEDICAL     Primary Care Physician Is Dr. Awa Field    Hyperlipidemia     Hypertension     Hypothyroidism due to acquired atrophy of thyroid 2/29/2020    Kidney stones \"Been Having Kidney Stones For 39 Years\"    Passed Kidney Stones Several Times    Nephrolithiasis     with hematuria episodes followed by Dr Riri John Nocturia     Pacemaker 8-1402    S/P AV ablation    Peptic ulcer, unspecified site, unspecified as acute or chronic, without mention of hemorrhage or perforation 1983    per EGD    Pneumonia Dx 2000's    Shortness of breath     Skin Cancer Arms And Ears In Past    Excision Skin Cancer Arms And Ears    Sleep apnea     No CPAP- had sleep study done 10+ yrs ago    Stented coronary artery 5/12/16    3.0 X 12 MM ELIGIO stent to OM 1    Teeth missing     Lower    Type 2 diabetes mellitus with other specified complication, unspecified whether long term insulin use ([facility] [address] 4/25/2022    Wears dentures     Full Lower    Wears glasses         Past Surgical History:   Procedure Laterality Date    CARDIAC PACEMAKER PLACEMENT  5-3-99, 1-29-14    Medtronic Sensia SR Pacemaker Implanted    COLONOSCOPY  8-12 - Dr Felicitas Valle    Polyps Removed In Past    CORONARY ANGIOPLASTY WITH STENT PLACEMENT      per old chart had cath with stent placement done 5/2016    CYSTOSCOPY Left 11/17/2017    cysto, left retrograde, flugeration of prostate    DENTAL SURGERY      Teeth Extracted In Past    ENDOSCOPY, COLON, DIAGNOSTIC  1983    KNEE SURGERY Right 1966    \"shaved the kneecap\"    LITHOTRIPSY  2009, 2014    Kidney Stones    PACEMAKER CHANGE N/A 12/13/2021    PACEMAKER GENERATOR CHANGE performed by Huston Saint, MD ritchie [facility] OR/INDIRA PACER BUT OLD LEADS ARE NOT MRI COMPATIBLE    PACEMAKER PLACEMENT  05/03/1999    Medtronic Sensia SR Pacemaker Implanted    PACEMAKER PLACEMENT  01/29/2014    Medtronic Sensia SR Pacemaker Implanted( had to replace the whole thing in 2014- that was my 3rd one - first one in [address] [place] [address] and next one 2007\"    SKIN CANCER EXCISION  In Past    Skin Cancer Excised Arms And Ears       Social History     Tobacco Use    Smoking status: Current Every Day Smoker     Packs/day: 0.50     Years: 74.00     Pack years: 37.00     Types: Cigarettes     Start date: 8/14/1948    Smokeless tobacco: Never Used   Substance Use Topics    Alcohol use: No     Alcohol/week: 0.0 standard drinks        Review of Systems   Constitutional: Negative for activity change, appetite change, chills, fever and unexpected weight change. HENT: Negative for congestion and sore throat. Respiratory: Negative for chest tightness and shortness of breath. Cardiovascular: Negative for chest pain and palpitations. Gastrointestinal: Negative for abdominal pain, constipation, diarrhea and vomiting. Genitourinary: Negative for dysuria. Skin: Negative for color change. Neurological: Negative for dizziness and light-headedness. Psychiatric/Behavioral: Negative for dysphoric mood. The patient is not nervous/anxious. Prior to Visit Medications    Medication Sig Taking? Authorizing Provider   gabapentin (NEURONTIN) 100 MG capsule Take 2 capsules by mouth 3 times daily for 90 days.   ORVILLE Senior NP   warfarin (COUMADIN) 2 MG tablet TAKE 1 TABLET BY MOUTH EVERY DAY  Crow Serrano MD   trihexyphenidyl (ARTANE) 2 MG tablet TAKE 1 TABLET BY MOUTH EVERY DAY  Crow Serrano MD   amLODIPine (NORVASC) 10 MG tablet TAKE 1 TABLET BY MOUTH EVERY DAY  Crow Serrano MD   levothyroxine (SYNTHROID) 25 MCG tablet Take 1 tablet by mouth Daily  Crow Serrano MD   alirocumab (PRALUENT) 75 MG/ML SOAJ injection pen Inject 1 mL into the skin every 14 days  Crow Serrano MD   rOPINIRole (REQUIP) 0.25 MG tablet Take 1 tablet by mouth daily  Patient taking differently: Take 0.25 mg by mouth nightly   Zahra Rivera MD   metoprolol succinate (TOPROL XL) 25 MG extended release tablet   Historical Provider, MD   fluticasone-salmeterol (ADVAIR HFA) 230-21 MCG/ACT inhaler Inhale 2 puffs into the lungs 2 times daily  Crow Serrano MD   famotidine (PEPCID) 20 MG tablet Take 0.5 tablets by mouth 2 times daily  Crow Serrano MD   zonisamide (ZONEGRAN) 100 MG capsule TAKE 2 CAPSULES BY MOUTH AT BEDTIME  Historical Provider, MD   trimethoprim (TRIMPEX) 100 MG tablet   Historical Provider, MD   UNABLE TO [address] ProviderMD Pankaj Serenoa repens, (SAW PALMETTO BERRY PO) Take by mouth  Historical Provider, MD   MAGNESIUM CHLORIDE PO Take 1 each by mouth daily   Historical Provider, MD   aspirin 81 MG EC tablet Take 1 tablet by mouth daily  Armando Mata MD   Multiple Vitamins-Minerals (CENTRUM PO) Take 1 tablet by mouth nightly Over The Counter   Historical Provider, MD   Cholecalciferol (VITAMIN D PO) Take 2,000 Units by mouth 2 times daily Over The Counter   Historical Provider, MD          Objective:      /70   Pulse 66   Wt 210 lb (95.3 kg)   SpO2 96%   BMI 30.13 kg/m²      Physical Exam  Vitals and nursing note reviewed. Constitutional:       General: He is not in acute distress. Appearance: Normal appearance. He is not ill-appearing or toxic-appearing. HENT:      Head: Normocephalic and atraumatic. Right Ear: External ear normal.      Left Ear: External ear normal.      Nose: Nose normal.      Mouth/Throat:      Pharynx: Oropharynx is clear. Eyes:      General: No scleral icterus. Right eye: No discharge. Left eye: No discharge. Extraocular Movements: Extraocular movements intact. Conjunctiva/sclera: Conjunctivae normal.   Cardiovascular:      Rate and Rhythm: Normal rate and regular rhythm. Heart sounds: Normal heart sounds. Pulmonary:      Effort: Pulmonary effort is normal.      Breath sounds: Normal breath sounds. No wheezing or rales. Musculoskeletal:         General: No deformity. Cervical back: Normal range of motion and neck supple. No rigidity. Skin:     General: Skin is warm and dry. Findings: No rash. Neurological:      General: No focal deficit present. Mental Status: He is alert. Mental status is at baseline. Motor: No weakness. Psychiatric:         Mood and Affect: Mood normal.         Behavior: Behavior normal.            Assessment / Plan:      1. Long term current use of anticoagulant therapy  INR therapeutic today. Continue current dose of coumadin. Repeat INR in 6 weeks. - POCT INR    2. Hypothyroidism due to acquired atrophy of thyroid  TSH still slightly elevated but patient asymptomatic. We will continue current dose of Synthroid for now and repeat labs in 3 months  - TSH; Future    3. Type 2 diabetes mellitus with other specified complication, unspecified whether long term insulin use ([address]  Well-controlled with diet. Hemoglobin A1c with some improvement after dietary modifications. Will continue to monitor.  - Hemoglobin A1C; Future    4. Moderate COPD (chronic obstructive pulmonary disease) (HCC)  Overall well controlled, symptoms stable. Patient currently using Advair occasionally as needed. Will provide albuterol instead, counseled to start using as needed. Will advise restarting ICS if requiring albuterol more than twice a week. Encouraged smoking cessation. - albuterol sulfate HFA (VENTOLIN HFA) 108 (90 Base) MCG/ACT inhaler; Inhale 2 puffs into the lungs every 6 hours as needed for Wheezing or Shortness of Breath  Dispense: 54 g; Refill: 1    5. Stage 3a chronic kidney disease (HCC)   Stable, will continue to monitor.   - Comprehensive Metabolic Panel; Future    6. Hypertension, secondary  Stable, well controlled. Continue current medications. - CBC with Auto Differential; Future  - Comprehensive Metabolic Panel; Future    7. Mixed hyperlipidemia  Cannot tolerate statins, will monitor lipid panel.  - Lipid Panel; Future          I have spent 32 minutes on this patient encounter. Patient voiced understanding and agreement with plan. All questions/concerns were addressed, risks/side effects of medications were reviewed. Return precautions and after visit summary were provided. Return in about 3 months (around 7/25/2022). or earlier as needed.       Marjorie Blankenship MD

## 2022-06-08 ENCOUNTER — NURSE ONLY (OUTPATIENT)
Dept: INTERNAL MEDICINE CLINIC | Age: 82
End: 2022-06-08
Payer: MEDICARE

## 2022-06-08 DIAGNOSIS — Z79.01 LONG TERM CURRENT USE OF ANTICOAGULANT THERAPY: Primary | ICD-10-CM

## 2022-06-08 LAB
INTERNATIONAL NORMALIZATION RATIO, POC: 2.9
PROTHROMBIN TIME, POC: 35

## 2022-06-08 PROCEDURE — 99211 OFF/OP EST MAY X REQ PHY/QHP: CPT | Performed by: FAMILY MEDICINE

## 2022-06-08 PROCEDURE — 85610 PROTHROMBIN TIME: CPT | Performed by: FAMILY MEDICINE

## 2022-06-08 NOTE — PROGRESS NOTES
Patient states he takes 2 mg daily. Per PCP: 06/08/22- Pt is to take 2 mg all days of the week. Recheck INR in 6 weeks.

## 2022-06-20 RX ORDER — WARFARIN SODIUM 2 MG/1
TABLET ORAL
Qty: 90 TABLET | Refills: 1 | Status: SHIPPED | OUTPATIENT
Start: 2022-06-20

## 2022-06-24 ENCOUNTER — TELEPHONE (OUTPATIENT)
Dept: NEUROLOGY | Age: 82
End: 2022-06-24

## 2022-06-24 NOTE — TELEPHONE ENCOUNTER
Pt's wife called and stated Devyn's tremors are worsening. He would like to know if his Gabapentin can be increased? Please advise.

## 2022-06-27 RX ORDER — ALIROCUMAB 75 MG/ML
75 INJECTION, SOLUTION SUBCUTANEOUS
Qty: 6 PEN | Refills: 1 | Status: SHIPPED | OUTPATIENT
Start: 2022-06-27

## 2022-06-27 NOTE — TELEPHONE ENCOUNTER
Is there any time of the day in particular that his tremor seems to be worse? I asked this because I need to know which dose to make an increase in as he is on 3 times daily dosing and he is on different amounts with his doses.

## 2022-07-18 RX ORDER — GABAPENTIN 100 MG/1
200 CAPSULE ORAL 3 TIMES DAILY
Qty: 540 CAPSULE | Refills: 0 | Status: SHIPPED | OUTPATIENT
Start: 2022-07-18 | End: 2022-08-05 | Stop reason: ALTCHOICE

## 2022-07-18 RX ORDER — FAMOTIDINE 20 MG/1
TABLET, FILM COATED ORAL
Qty: 90 TABLET | Refills: 3 | Status: SHIPPED | OUTPATIENT
Start: 2022-07-18

## 2022-07-29 ENCOUNTER — HOSPITAL ENCOUNTER (OUTPATIENT)
Age: 82
Discharge: HOME | End: 2022-07-29
Payer: MEDICARE

## 2022-07-29 DIAGNOSIS — I15.9 HYPERTENSION, SECONDARY: ICD-10-CM

## 2022-07-29 DIAGNOSIS — E03.4 HYPOTHYROIDISM DUE TO ACQUIRED ATROPHY OF THYROID: ICD-10-CM

## 2022-07-29 DIAGNOSIS — N18.31 STAGE 3A CHRONIC KIDNEY DISEASE (HCC): ICD-10-CM

## 2022-07-29 DIAGNOSIS — E78.2 MIXED HYPERLIPIDEMIA: ICD-10-CM

## 2022-07-29 DIAGNOSIS — E11.69 TYPE 2 DIABETES MELLITUS WITH OTHER SPECIFIED COMPLICATION, UNSPECIFIED WHETHER LONG TERM INSULIN USE (HCC): ICD-10-CM

## 2022-07-29 LAB
ALBUMIN SERPL-MCNC: 4.5 GM/DL (ref 3.4–5)
ALP BLD-CCNC: 135 IU/L (ref 40–128)
ALT SERPL-CCNC: 15 U/L (ref 10–40)
ANION GAP SERPL CALCULATED.3IONS-SCNC: 12 MMOL/L (ref 4–16)
AST SERPL-CCNC: 17 IU/L (ref 15–37)
BASOPHILS ABSOLUTE: 0.1 K/CU MM
BASOPHILS RELATIVE PERCENT: 0.6 % (ref 0–1)
BILIRUB SERPL-MCNC: 0.4 MG/DL (ref 0–1)
BUN BLDV-MCNC: 22 MG/DL (ref 6–23)
CALCIUM SERPL-MCNC: 9 MG/DL (ref 8.3–10.6)
CHLORIDE BLD-SCNC: 108 MMOL/L (ref 99–110)
CHOLESTEROL: 166 MG/DL
CO2: 21 MMOL/L (ref 21–32)
CREAT SERPL-MCNC: 1.3 MG/DL (ref 0.9–1.3)
DIFFERENTIAL TYPE: ABNORMAL
EOSINOPHILS ABSOLUTE: 0.2 K/CU MM
EOSINOPHILS RELATIVE PERCENT: 1.9 % (ref 0–3)
ESTIMATED AVERAGE GLUCOSE: 151 MG/DL
GFR AFRICAN AMERICAN: >60 ML/MIN/1.73M2
GFR NON-AFRICAN AMERICAN: 53 ML/MIN/1.73M2
GLUCOSE BLD-MCNC: 111 MG/DL (ref 70–99)
HBA1C MFR BLD: 6.9 % (ref 4.2–6.3)
HCT VFR BLD CALC: 48 % (ref 42–52)
HDLC SERPL-MCNC: 56 MG/DL
HEMOGLOBIN: 15.4 GM/DL (ref 13.5–18)
IMMATURE NEUTROPHIL %: 0.4 % (ref 0–0.43)
LDL CHOLESTEROL CALCULATED: 88 MG/DL
LYMPHOCYTES ABSOLUTE: 1.9 K/CU MM
LYMPHOCYTES RELATIVE PERCENT: 24.6 % (ref 24–44)
MCH RBC QN AUTO: 31.4 PG (ref 27–31)
MCHC RBC AUTO-ENTMCNC: 32.1 % (ref 32–36)
MCV RBC AUTO: 98 FL (ref 78–100)
MONOCYTES ABSOLUTE: 0.9 K/CU MM
MONOCYTES RELATIVE PERCENT: 11.6 % (ref 0–4)
NUCLEATED RBC %: 0 %
PDW BLD-RTO: 14.9 % (ref 11.7–14.9)
PLATELET # BLD: 191 K/CU MM (ref 140–440)
PMV BLD AUTO: 11 FL (ref 7.5–11.1)
POTASSIUM SERPL-SCNC: 4.2 MMOL/L (ref 3.5–5.1)
RBC # BLD: 4.9 M/CU MM (ref 4.6–6.2)
SEGMENTED NEUTROPHILS ABSOLUTE COUNT: 4.8 K/CU MM
SEGMENTED NEUTROPHILS RELATIVE PERCENT: 60.9 % (ref 36–66)
SODIUM BLD-SCNC: 141 MMOL/L (ref 135–145)
TOTAL IMMATURE NEUTOROPHIL: 0.03 K/CU MM
TOTAL NUCLEATED RBC: 0 K/CU MM
TOTAL PROTEIN: 6.9 GM/DL (ref 6.4–8.2)
TRIGL SERPL-MCNC: 111 MG/DL
TSH HIGH SENSITIVITY: 2.63 UIU/ML (ref 0.27–4.2)
WBC # BLD: 7.8 K/CU MM (ref 4–10.5)

## 2022-07-29 PROCEDURE — 36415 COLL VENOUS BLD VENIPUNCTURE: CPT

## 2022-07-29 PROCEDURE — 84443 ASSAY THYROID STIM HORMONE: CPT

## 2022-07-29 PROCEDURE — 85025 COMPLETE CBC W/AUTO DIFF WBC: CPT

## 2022-07-29 PROCEDURE — 83036 HEMOGLOBIN GLYCOSYLATED A1C: CPT

## 2022-07-29 PROCEDURE — 80053 COMPREHEN METABOLIC PANEL: CPT

## 2022-07-29 PROCEDURE — 80061 LIPID PANEL: CPT

## 2022-08-04 ENCOUNTER — TELEPHONE (OUTPATIENT)
Dept: INTERNAL MEDICINE CLINIC | Age: 82
End: 2022-08-04

## 2022-08-04 ENCOUNTER — OFFICE VISIT (OUTPATIENT)
Dept: INTERNAL MEDICINE CLINIC | Age: 82
End: 2022-08-04
Payer: MEDICARE

## 2022-08-04 VITALS
DIASTOLIC BLOOD PRESSURE: 56 MMHG | OXYGEN SATURATION: 97 % | HEART RATE: 64 BPM | HEIGHT: 70 IN | WEIGHT: 211 LBS | BODY MASS INDEX: 30.21 KG/M2 | SYSTOLIC BLOOD PRESSURE: 112 MMHG

## 2022-08-04 DIAGNOSIS — E03.4 HYPOTHYROIDISM DUE TO ACQUIRED ATROPHY OF THYROID: ICD-10-CM

## 2022-08-04 DIAGNOSIS — I15.9 HYPERTENSION, SECONDARY: ICD-10-CM

## 2022-08-04 DIAGNOSIS — I48.91 ATRIAL FIBRILLATION, UNSPECIFIED TYPE (HCC): ICD-10-CM

## 2022-08-04 DIAGNOSIS — G25.2 RESTING TREMOR: ICD-10-CM

## 2022-08-04 DIAGNOSIS — R21 RASH: ICD-10-CM

## 2022-08-04 DIAGNOSIS — J44.9 MODERATE COPD (CHRONIC OBSTRUCTIVE PULMONARY DISEASE) (HCC): ICD-10-CM

## 2022-08-04 DIAGNOSIS — Z79.01 LONG TERM CURRENT USE OF ANTICOAGULANT THERAPY: ICD-10-CM

## 2022-08-04 DIAGNOSIS — E11.69 TYPE 2 DIABETES MELLITUS WITH OTHER SPECIFIED COMPLICATION, UNSPECIFIED WHETHER LONG TERM INSULIN USE (HCC): Primary | ICD-10-CM

## 2022-08-04 LAB
INTERNATIONAL NORMALIZATION RATIO, POC: 2.4
PROTHROMBIN TIME, POC: 28.6

## 2022-08-04 PROCEDURE — 4004F PT TOBACCO SCREEN RCVD TLK: CPT | Performed by: FAMILY MEDICINE

## 2022-08-04 PROCEDURE — G8417 CALC BMI ABV UP PARAM F/U: HCPCS | Performed by: FAMILY MEDICINE

## 2022-08-04 PROCEDURE — 99214 OFFICE O/P EST MOD 30 MIN: CPT | Performed by: FAMILY MEDICINE

## 2022-08-04 PROCEDURE — 1123F ACP DISCUSS/DSCN MKR DOCD: CPT | Performed by: FAMILY MEDICINE

## 2022-08-04 PROCEDURE — 3023F SPIROM DOC REV: CPT | Performed by: FAMILY MEDICINE

## 2022-08-04 PROCEDURE — 3044F HG A1C LEVEL LT 7.0%: CPT | Performed by: FAMILY MEDICINE

## 2022-08-04 PROCEDURE — 85610 PROTHROMBIN TIME: CPT | Performed by: FAMILY MEDICINE

## 2022-08-04 PROCEDURE — G8427 DOCREV CUR MEDS BY ELIG CLIN: HCPCS | Performed by: FAMILY MEDICINE

## 2022-08-04 RX ORDER — TRIAMCINOLONE ACETONIDE 0.25 MG/G
OINTMENT TOPICAL 2 TIMES DAILY
Qty: 1 EACH | Refills: 1 | Status: SHIPPED | OUTPATIENT
Start: 2022-08-04 | End: 2022-08-11

## 2022-08-04 SDOH — ECONOMIC STABILITY: FOOD INSECURITY: WITHIN THE PAST 12 MONTHS, YOU WORRIED THAT YOUR FOOD WOULD RUN OUT BEFORE YOU GOT MONEY TO BUY MORE.: NEVER TRUE

## 2022-08-04 SDOH — ECONOMIC STABILITY: FOOD INSECURITY: WITHIN THE PAST 12 MONTHS, THE FOOD YOU BOUGHT JUST DIDN'T LAST AND YOU DIDN'T HAVE MONEY TO GET MORE.: NEVER TRUE

## 2022-08-04 ASSESSMENT — ENCOUNTER SYMPTOMS
CHEST TIGHTNESS: 0
SORE THROAT: 0
ABDOMINAL PAIN: 0
VOMITING: 0
CONSTIPATION: 0
SHORTNESS OF BREATH: 0
COLOR CHANGE: 1
DIARRHEA: 0

## 2022-08-04 ASSESSMENT — SOCIAL DETERMINANTS OF HEALTH (SDOH): HOW HARD IS IT FOR YOU TO PAY FOR THE VERY BASICS LIKE FOOD, HOUSING, MEDICAL CARE, AND HEATING?: NOT HARD AT ALL

## 2022-08-04 NOTE — PROGRESS NOTES
8/4/2022 Pt states he takes 2mg of Coumadin all days of the week. Per PCP: 08/04/22- Pt is to take 2 mg all days of the week. Recheck INR in 6 weeks. Pt voiced understanding.

## 2022-08-04 NOTE — PROGRESS NOTES
Subjective:      Chief Complaint   Patient presents with    3 Month Follow-Up       HPI:  Kelvin Quinn is a 78 y.o. male who presents today for follow up of chronic conditions as listed below. INR: 2.4 today. Taking coumadin 2mg all 7 days of the week. Hypothyroidism:  TSH back to normal.  Denies any symptoms. Prediabetes:  HbA1c now 6.9. States he has continued to cut down on sugary drinks, trying to drink more water. Tremor:  Dose was increased to 300mg in the afternoon by neurology, still taking 200mg in morning and evening. States he has not noticed any improvement in symptoms since dose change. COPD:  Was given albuterol at last appointment. States he has not needed to use it very often. States he has had a rash on the front of his ankles for the past few months. Started as small areas, which have grown and convalesced into larger areas. Does not hurt or itch. Has not tried any treatment. Has tried changing shoes, which has not made a difference. Does have a dermatologist.       Feeling well today, no acute concerns. Labs reviewed.         Past Medical History:   Diagnosis Date    Acid reflux     Adenomatous polyp of colon 1-2009    last colonoscopy recomm 3 yr follow up    Anticoagulated 2010    takes for afib Dr Rolanda Schultz monitors INR 1.2 on 02/08/2018    Arthritis     \"arthritis in low back\"    CAD (coronary artery disease)     follow with dr Freddie Carbo- cardiac clearance for surgery done 10/31/2017 on chart)    Chronic atrial fibrillation (HCC)     Sees Dr. Teresa Yates    COPD (chronic obstructive pulmonary disease) ([facility] [address]     follows with dr Joaquin Landa    Diverticulitis     Diverticulosis of colon     left colon    Enlarged prostate     \"had to ream me out a couple of times\":    H/O cardiac catheterization 1/31/2006    R codominant, RCA patent, LAD patent with minimal nonobstructive irreg, CIRC patent and codominant, RI small but patent    H/O cardiovascular stress test 5/18/10 EF81% decrease in perfusion in inferoseptal segments c/w paced rhythm, LV small in volume    H/O cardiovascular stress test 5/5/2016    lexiscan-normal,EF70%    H/O cardiovascular stress test 6/2/2016    treadmill    H/O Doppler Carotid ultrasound 11/01/2019    Mild (0-49%) disease of the Bilateral proximal Internal carotid artery. H/O Doppler ultrasound 06/15/2016    minimal plaque throughout bilaterally    H/O echocardiogram 5/3/16    EF 50-55%. Mild concentric LV hypertrophy with low normal systolic function. Mod bilateral atrial and right ventricle dilatation. Mild AR, MR, TR, GA. Absence of pericardial effsion. H/O echocardiogram 8/4/11    EF(not given in faxed records) mild MR, mod LAE, mild AR    H/O echocardiogram 09/18/2019    EF 55-60%,  The left atrium is moderate to severely dilated. H/O percutaneous left heart catheterization 5/12/16    left circumflex is 60% very distal stenosis at the bifurcation, OM1 is 99% mid segment stenosis    H/O unstable angina     History of Doppler ultrasound 3/29/12    Abd Ao Duplex - no abdominal aoritic aneurysm or iliac aneurysm identified    History of Doppler ultrasound 10/19/10    carotid - 0-19% bilateral ICAs, R and L carotid system show minimal atheromatous diseassse without stenosis    History of Holter monitoring 10/19/10    rhythm is ventricular paced with underlying a-fib    History of nuclear stress test 02/06/2020    Normal study.     Hx of CT scan of chest 8/17/10    mod-severe centrilobular emphysematous changes    HX OTHER MEDICAL     Primary Care Physician Is Dr. Shay Bravo Boor    Hyperlipidemia     Hypertension     Hypothyroidism due to acquired atrophy of thyroid 2/29/2020    Kidney stones \"Been Having Kidney Stones For 39 Years\"    Passed Kidney Stones Several Times    Nephrolithiasis     with hematuria episodes followed by Dr Drake Dean    Nocturia     Pacemaker [id number]    S/P AV ablation    Peptic ulcer, unspecified site, unspecified as acute or chronic, without mention of hemorrhage or perforation 1983    per EGD    Pneumonia Dx 2000's    Shortness of breath     Skin Cancer Arms And Ears In Past    Excision Skin Cancer Arms And Ears    Sleep apnea     No CPAP- had sleep study done 10+ yrs ago    Stented coronary artery 5/12/16    3.0 X 12 MM ELIGIO stent to OM 1    Teeth missing     Lower    Type 2 diabetes mellitus with other specified complication, unspecified whether long term insulin use ([facility] [address] 4/25/2022    Wears dentures     Full Lower    Wears glasses         Past Surgical History:   Procedure Laterality Date    CARDIAC PACEMAKER PLACEMENT  5-3-99, 1-29-14    Medtronic Sensia SR Pacemaker Implanted    COLONOSCOPY  8-12 - Dr Monica Renteria    Polyps Removed In Past    CORONARY ANGIOPLASTY WITH STENT PLACEMENT      per old chart had cath with stent placement done 5/2016    CYSTOSCOPY Left 11/17/2017    cysto, left retrograde, flugeration of prostate    DENTAL SURGERY      Teeth Extracted In Past    ENDOSCOPY, COLON, DIAGNOSTIC  1983    KNEE SURGERY Right 1966    \"shaved the kneecap\"    LITHOTRIPSY  2009, 2014    Kidney Stones    PACEMAKER CHANGE N/A 12/13/2021    PACEMAKER GENERATOR CHANGE performed by Saray Albright MD ritchie [facility] OR/INDIRA PACER BUT OLD LEADS ARE NOT MRI COMPATIBLE    PACEMAKER PLACEMENT  05/03/1999    Medtronic Sensia SR Pacemaker Implanted    PACEMAKER PLACEMENT  01/29/2014    Medtronic Sensia SR Pacemaker Implanted( had to replace the whole thing in 2014- that was my 3rd one - first one in [address] [place] [address] and next one 2007\"    SKIN CANCER EXCISION  In Past    Skin Cancer Excised Arms And Ears       Social History     Tobacco Use    Smoking status: Every Day     Packs/day: 0.50     Years: 74.00     Pack years: 37.00     Types: Cigarettes     Start date: [birth year]/14/1948    Smokeless tobacco: Never   Substance Use Topics    Alcohol use: No     Alcohol/week: 0.0 standard drinks        Review of Systems   Constitutional:  Negative for activity change, appetite change, chills, fever and unexpected weight change. HENT:  Negative for congestion and sore throat. Respiratory:  Negative for chest tightness and shortness of breath. Cardiovascular:  Negative for chest pain and palpitations. Gastrointestinal:  Negative for abdominal pain, constipation, diarrhea and vomiting. Genitourinary:  Negative for dysuria. Skin:  Positive for color change and rash. Neurological:  Negative for dizziness and light-headedness. Psychiatric/Behavioral:  Negative for dysphoric mood. The patient is not nervous/anxious. Prior to Visit Medications    Medication Sig Taking? Authorizing Provider   famotidine (PEPCID) 20 MG tablet TAKE 1/2 TABLET BY MOUTH TWICE A DAY Yes Kelin Chase MD   gabapentin (NEURONTIN) 100 MG capsule TAKE 2 CAPSULES BY MOUTH 3 TIMES DAILY FOR 90 DAYS.  Yes ORVILLE Roche NP   PRALUENT 75 MG/ML SOAJ injection pen INJECT 1 ML INTO THE SKIN EVERY 14 DAYS Yes Kelin Chase MD   warfarin (COUMADIN) 2 MG tablet TAKE 1 TABLET BY MOUTH EVERY DAY Yes Kelin Chase MD   albuterol sulfate HFA (VENTOLIN HFA) 108 (90 Base) MCG/ACT inhaler Inhale 2 puffs into the lungs every 6 hours as needed for Wheezing or Shortness of Breath Yes Kelin Chase MD   trihexyphenidyl (ARTANE) 2 MG tablet TAKE 1 TABLET BY MOUTH EVERY DAY Yes Kelin Chase MD   amLODIPine (NORVASC) 10 MG tablet TAKE 1 TABLET BY MOUTH EVERY DAY Yes Kelin Chase MD   rOPINIRole (REQUIP) 0.25 MG tablet Take 1 tablet by mouth daily  Patient taking differently: Take 0.25 mg by mouth nightly Yes Jone Chandra MD   metoprolol succinate (TOPROL XL) 25 MG extended release tablet  Yes Historical Provider, MD   fluticasone-salmeterol (ADVAIR HFA) 230-21 MCG/ACT inhaler Inhale 2 puffs into the lungs 2 times daily Yes Kelin Chase MD   zonisamide (ZONEGRAN) 100 MG capsule TAKE 2 CAPSULES BY MOUTH AT BEDTIME Yes Historical Provider, MD   trimethoprim (TRIMPEX) 100 MG tablet  Yes Historical Provider, MD   UNABLE TO FIND GSH-3 Cell Defense Yes Historical Provider, MD Pankaj Moeller Serenoa repens, (SAW PALMETTO BERRY PO) Take by mouth Yes Historical Provider, MD   MAGNESIUM CHLORIDE PO Take 1 each by mouth daily  Yes Historical Provider, MD   aspirin 81 MG EC tablet Take 1 tablet by mouth daily Yes Raffaele Bassett MD   Multiple Vitamins-Minerals (CENTRUM PO) Take 1 tablet by mouth nightly Over The Counter  Yes Historical Provider, MD   Cholecalciferol (VITAMIN D PO) Take 2,000 Units by mouth 2 times daily Over The Counter  Yes Historical Provider, MD   levothyroxine (SYNTHROID) 25 MCG tablet Take 1 tablet by mouth Daily  Karen Heller MD          Objective:      BP (!) 112/56 (Site: Left Upper Arm, Position: Sitting, Cuff Size: Large Adult)   Pulse 64   Ht 5' 10\" (1.778 m)   Wt 211 lb (95.7 kg)   SpO2 97%   BMI 30.28 kg/m²      Physical Exam  Vitals and nursing note reviewed. Constitutional:       General: He is not in acute distress. Appearance: Normal appearance. He is not ill-appearing or toxic-appearing. HENT:      Head: Normocephalic and atraumatic. Right Ear: External ear normal.      Left Ear: External ear normal.      Nose: Nose normal.      Mouth/Throat:      Pharynx: Oropharynx is clear. Eyes:      General: No scleral icterus. Right eye: No discharge. Left eye: No discharge. Extraocular Movements: Extraocular movements intact. Conjunctiva/sclera: Conjunctivae normal.   Cardiovascular:      Rate and Rhythm: Normal rate and regular rhythm. Heart sounds: Normal heart sounds. Pulmonary:      Effort: Pulmonary effort is normal.      Breath sounds: Normal breath sounds. No wheezing or rales. Musculoskeletal:         General: No deformity. Cervical back: Normal range of motion and neck supple. No rigidity. Skin:     General: Skin is warm and dry.       Findings: Rash (bright erythematous/petechial rash on front of ankles bilaterally with overlying white, scaling/dry skin) present. Neurological:      General: No focal deficit present. Mental Status: He is alert. Mental status is at baseline. Motor: No weakness. Comments: Resting tremor   Psychiatric:         Mood and Affect: Mood normal.         Behavior: Behavior normal.          Assessment / Plan:      1. Type 2 diabetes mellitus with other specified complication, unspecified whether long term insulin use (HCC)  Some increase in hemoglobin A1c, but still well controlled for age. Will continue to monitor.  - Hemoglobin A1C; Future    2. Long term current use of anticoagulant therapy  INR therapeutic today. Continue current dose of coumadin. Repeat INR in 6 weeks. - POCT INR    3. Hypothyroidism due to acquired atrophy of thyroid  TSH has normalized, continue current dose of Synthroid. - TSH; Future    4. Hypertension, secondary  Stable, well controlled. Continue current medications. - CBC with Auto Differential; Future  - Comprehensive Metabolic Panel; Future    5. Rash  Scaling petechial rash on front of ankles bilaterally, has been present for months- etiology unclear. Will try topical steroids, but if symptoms not improving in the next week, instructed to contact his dermatologist for appointment. - triamcinolone (KENALOG) 0.025 % ointment; Apply topically 2 times daily for 7 days Apply topically 2 times daily. Dispense: 1 each; Refill: 1    6. Resting tremor  Has not had improvement with increased gabapentin dose, continue following with neurology. 7. Atrial fibrillation, unspecified type ([facility] [address]  Pacemaker in place. Continue current dose of Coumadin. Following with cardiology. 8. Moderate COPD (chronic obstructive pulmonary disease) (HCC)  Stable, well controlled. Continue current medications. I have spent 30 minutes on this patient encounter.      Patient voiced understanding and agreement with plan. All questions/concerns were addressed, risks/side effects of medications were reviewed. Return precautions and after visit summary were provided. Return in about 4 months (around 12/4/2022). or earlier as needed.       Carlene Iverson MD

## 2022-08-05 ENCOUNTER — OFFICE VISIT (OUTPATIENT)
Dept: NEUROLOGY | Age: 82
End: 2022-08-05
Payer: MEDICARE

## 2022-08-05 VITALS
HEIGHT: 70 IN | WEIGHT: 211 LBS | BODY MASS INDEX: 30.21 KG/M2 | OXYGEN SATURATION: 99 % | SYSTOLIC BLOOD PRESSURE: 120 MMHG | HEART RATE: 87 BPM | DIASTOLIC BLOOD PRESSURE: 66 MMHG

## 2022-08-05 DIAGNOSIS — Z95.0 CARDIAC PACEMAKER IN SITU: ICD-10-CM

## 2022-08-05 DIAGNOSIS — G25.2 RESTING TREMOR: Primary | ICD-10-CM

## 2022-08-05 DIAGNOSIS — G25.3 MYOCLONUS: ICD-10-CM

## 2022-08-05 DIAGNOSIS — R25.1 TREMOR: ICD-10-CM

## 2022-08-05 PROCEDURE — 1123F ACP DISCUSS/DSCN MKR DOCD: CPT | Performed by: NURSE PRACTITIONER

## 2022-08-05 PROCEDURE — 99214 OFFICE O/P EST MOD 30 MIN: CPT | Performed by: NURSE PRACTITIONER

## 2022-08-05 RX ORDER — GABAPENTIN 300 MG/1
300 CAPSULE ORAL 3 TIMES DAILY
Qty: 90 CAPSULE | Refills: 2 | Status: SHIPPED | OUTPATIENT
Start: 2022-08-05 | End: 2022-09-06 | Stop reason: SDUPTHER

## 2022-08-05 NOTE — PROGRESS NOTES
8/5/22    Nathalia Kirk  1940    Chief Complaint   Patient presents with    Tremors     Pt presents for f/u of tremors, pt states tremors are the same, pt states mostly in the right leg       History of Present Illness  Emma Stoll is a 78 y.o. male presenting today for follow-up of RLE tremor/myoclonus. Emma Stoll is taking Ropinrole 0.25 mg at bedtime, Zonisamide 200 mg at bedtime and Gabapentin. At his last visit, Gabapentin was increased to 200 mg three times daily. He saw an improvement and his kidney function has remained stable with the increase in Gabapentin, last creatinine level on 7/29/22 was 1.3 which is improved from January. Emma Stoll has tried Botox for myoclonus which became less therapeutic over time unfortunately. He was unable to have an MRI of his spine to evaluate the myoclonus however it was incompatible with his pacemaker. At today's visit, Emma Stoll tells me that his right leg tremor is still affecting his quality of life. He notices it at rest and with activity. He is currently taking gabapentin 200 mg in the morning, 300 mg in the afternoon, 200 mg in the evening. Current Outpatient Medications   Medication Sig Dispense Refill    gabapentin (NEURONTIN) 300 MG capsule Take 1 capsule by mouth in the morning and 1 capsule at noon and 1 capsule before bedtime. Do all this for 30 days. 90 capsule 2    triamcinolone (KENALOG) 0.025 % ointment Apply topically 2 times daily for 7 days Apply topically 2 times daily.  1 each 1    famotidine (PEPCID) 20 MG tablet TAKE 1/2 TABLET BY MOUTH TWICE A DAY 90 tablet 3    PRALUENT 75 MG/ML SOAJ injection pen INJECT 1 ML INTO THE SKIN EVERY 14 DAYS 6 pen 1    warfarin (COUMADIN) 2 MG tablet TAKE 1 TABLET BY MOUTH EVERY DAY 90 tablet 1    albuterol sulfate HFA (VENTOLIN HFA) 108 (90 Base) MCG/ACT inhaler Inhale 2 puffs into the lungs every 6 hours as needed for Wheezing or Shortness of Breath 54 g 1    trihexyphenidyl (ARTANE) 2 MG tablet TAKE 1 TABLET BY MOUTH EVERY DAY 90 tablet 1    amLODIPine (NORVASC) 10 MG tablet TAKE 1 TABLET BY MOUTH EVERY DAY 90 tablet 3    levothyroxine (SYNTHROID) 25 MCG tablet Take 1 tablet by mouth Daily 90 tablet 3    rOPINIRole (REQUIP) 0.25 MG tablet Take 1 tablet by mouth daily (Patient taking differently: Take 0.25 mg by mouth nightly) 90 tablet 3    metoprolol succinate (TOPROL XL) 25 MG extended release tablet       fluticasone-salmeterol (ADVAIR HFA) 230-21 MCG/ACT inhaler Inhale 2 puffs into the lungs 2 times daily 3 each 3    zonisamide (ZONEGRAN) 100 MG capsule TAKE 2 CAPSULES BY MOUTH AT BEDTIME      trimethoprim (TRIMPEX) 100 MG tablet       UNABLE TO FIND GSH-3 Cell Defense      Saw Palmetto, Serenoa repens, (SAW PALMETTO BERRY PO) Take by mouth      MAGNESIUM CHLORIDE PO Take 1 each by mouth daily       aspirin 81 MG EC tablet Take 1 tablet by mouth daily 30 tablet 3    Multiple Vitamins-Minerals (CENTRUM PO) Take 1 tablet by mouth nightly Over The Counter       Cholecalciferol (VITAMIN D PO) Take 2,000 Units by mouth 2 times daily Over The Counter        No current facility-administered medications for this visit.        Physical Exam:  Mental Status              Orientation: oriented to person, oriented to place, oriented to problem and oriented to time                        Mood/affectappropriate mood and appropriate affect              Memory/Other: recent memory intact, remote memory intact, fund of knowledge intact, attention span normal and concentration normal  Language  Language: (normal) language, no dysarthria, (normal) articulation and no dysphasia/aphasia  Cranial Nerves              Eyes: pupils normal size and reactive to light and visual fields appear full              CN III, IV, VI : extraocular muscle strength normal, normal pursuit, no nystagmus and no ptosis              Facial Motor: normal facial motor              CN XII: tongue protrudes midline  Motor/Coordination Exam              Power: motor strength appears intact throughout, no arm drift and normal tone              Coordination: normal finger-to-nose, forearm rotation intact and rapid alternating movement normal  Sensory Exam No Bradykinesia, No Dyskindesia, Normal strength, Normal Tone Normal bulk and Normal tone mild weakness in left foot with push, moderate myoclonus RLE with moderate tremor. Gait and Stance              Gait/Posture: Station normal, casual gait normal, ambulates independently, moderate Romberg's sway  with eyes open and closed, tiptoe abnormal and why was unable to do heel toe walking as he felt unsteady. /66 (Site: Left Upper Arm, Position: Sitting, Cuff Size: Large Adult)   Pulse 87   Ht 5' 10\" (1.778 m)   Wt 211 lb (95.7 kg)   SpO2 99%   BMI 30.28 kg/m²     Assessment and Plan     Diagnosis Orders   1. Resting tremor        2. Cardiac pacemaker in situ        3. Tremor  gabapentin (NEURONTIN) 300 MG capsule      4. Myoclonus  gabapentin (NEURONTIN) 300 MG capsule      Waylon Horowitz was seen in neurological follow up in regards to right leg tremor/myoclonus. Since his kidney function has remained stable and he is not satisfied with the treatment of his tremor, I will increase his dose of Gabapentin to 300 mg three times daily. Waylon Horowitz tells me he did notice an improvement in his tremor with his last increase of gabapentin so I am hopeful that this will further help. Medications prescribed for the patient were discussed in detail. This included a discussion of the potential risks vs the potential benefits of the medications. The patient was given time to ask questions and these were answered to the best of my ability. The patient appeared to understand the information provided. Return in about 3 months (around 11/5/2022).     Stephane Luis, ORVILLE - CNP

## 2022-08-09 ENCOUNTER — TELEPHONE (OUTPATIENT)
Dept: NEUROLOGY | Age: 82
End: 2022-08-09

## 2022-08-09 NOTE — TELEPHONE ENCOUNTER
Pt wife stopped in office stating pt did not schedule follow up visit or obtain AVS at last visit. Informed that pt will have to be called to schedule, verified spouse with ID, and provided AVS from last ov.

## 2022-09-06 DIAGNOSIS — G25.3 MYOCLONUS: ICD-10-CM

## 2022-09-06 DIAGNOSIS — R25.1 TREMOR: ICD-10-CM

## 2022-09-06 RX ORDER — GABAPENTIN 300 MG/1
300 CAPSULE ORAL 3 TIMES DAILY
Qty: 270 CAPSULE | Refills: 0 | Status: SHIPPED | OUTPATIENT
Start: 2022-09-06 | End: 2022-10-06

## 2022-09-06 NOTE — TELEPHONE ENCOUNTER
Received call from pt wife stating gabapentin rx was sent for 30 instead of 90 day supply. She is requesting 90 day supply until November follow up.  Rx pending approval.

## 2022-09-09 DIAGNOSIS — G25.2 RESTING TREMOR: ICD-10-CM

## 2022-09-12 RX ORDER — TRIHEXYPHENIDYL HYDROCHLORIDE 2 MG/1
TABLET ORAL
Qty: 90 TABLET | Refills: 1 | Status: SHIPPED | OUTPATIENT
Start: 2022-09-12

## 2022-09-13 ENCOUNTER — NURSE ONLY (OUTPATIENT)
Dept: INTERNAL MEDICINE CLINIC | Age: 82
End: 2022-09-13
Payer: MEDICARE

## 2022-09-13 DIAGNOSIS — Z79.01 LONG TERM CURRENT USE OF ANTICOAGULANT THERAPY: Primary | ICD-10-CM

## 2022-09-13 LAB
INTERNATIONAL NORMALIZATION RATIO, POC: 2.3
PROTHROMBIN TIME, POC: 28

## 2022-09-13 PROCEDURE — 85610 PROTHROMBIN TIME: CPT | Performed by: FAMILY MEDICINE

## 2022-09-13 NOTE — PROGRESS NOTES
Pt here for INR. INR today is 2.3. Pt states he takes 2mg Warfarin all days of the week. Per PCP: 09/13/22-Pt is to take 2 mg all days of the week. Recheck INR in 6 weeks. Pt informed and voiced understanding.

## 2022-10-17 ENCOUNTER — TELEPHONE (OUTPATIENT)
Dept: INTERNAL MEDICINE CLINIC | Age: 82
End: 2022-10-17

## 2022-10-17 NOTE — TELEPHONE ENCOUNTER
Patient's wife, Kelli Jonas, called stating that patient has been experiencing double vision off and on for the last week. She also states patient's left eye has been red and irritated. Eye drops have helped some but, symptoms return. No other symptoms. Please advise.

## 2022-10-18 NOTE — TELEPHONE ENCOUNTER
Patient needs to see his eye doctor- if they cannot find a diagnosis/treatment, he needs to come see me in clinic. Thanks.

## 2022-10-26 ENCOUNTER — NURSE ONLY (OUTPATIENT)
Dept: INTERNAL MEDICINE CLINIC | Age: 82
End: 2022-10-26
Payer: MEDICARE

## 2022-10-26 DIAGNOSIS — Z79.01 LONG TERM CURRENT USE OF ANTICOAGULANT THERAPY: Primary | ICD-10-CM

## 2022-10-26 LAB
INTERNATIONAL NORMALIZATION RATIO, POC: 2.8
PROTHROMBIN TIME, POC: 33.2

## 2022-10-26 PROCEDURE — 85610 PROTHROMBIN TIME: CPT | Performed by: FAMILY MEDICINE

## 2022-10-26 NOTE — PROGRESS NOTES
Pt came in on this date for INR check. Per PCP: 09/13/22-Pt is to take 2 mg all days of the week. Recheck INR in 6 weeks. 10/26/2022: Pt is to stay the same.  Return in 6 weeks

## 2022-10-31 RX ORDER — LEVOTHYROXINE SODIUM 0.03 MG/1
TABLET ORAL
Qty: 90 TABLET | Refills: 3 | Status: SHIPPED | OUTPATIENT
Start: 2022-10-31

## 2022-10-31 NOTE — TELEPHONE ENCOUNTER
Pts wife left a voicemail stating [facility] denied his refill on Zonisamide and she was wondering why. After looking at the last office note it appears pt is due for a refill and it is still current treatment plan.  RX pended for provider approval.

## 2022-11-01 RX ORDER — ZONISAMIDE 100 MG/1
CAPSULE ORAL
Qty: 60 CAPSULE | Refills: 5 | Status: SHIPPED | OUTPATIENT
Start: 2022-11-01

## 2022-11-29 ENCOUNTER — OFFICE VISIT (OUTPATIENT)
Dept: NEUROLOGY | Age: 82
End: 2022-11-29
Payer: MEDICARE

## 2022-11-29 VITALS
OXYGEN SATURATION: 91 % | BODY MASS INDEX: 28.63 KG/M2 | SYSTOLIC BLOOD PRESSURE: 120 MMHG | DIASTOLIC BLOOD PRESSURE: 78 MMHG | HEART RATE: 73 BPM | HEIGHT: 70 IN | WEIGHT: 200 LBS

## 2022-11-29 DIAGNOSIS — G25.3 MYOCLONUS: ICD-10-CM

## 2022-11-29 DIAGNOSIS — I48.20 CHRONIC ATRIAL FIBRILLATION (HCC): ICD-10-CM

## 2022-11-29 DIAGNOSIS — I63.9 MULTIPLE CEREBRAL INFARCTIONS (HCC): ICD-10-CM

## 2022-11-29 DIAGNOSIS — R25.1 TREMOR: Primary | ICD-10-CM

## 2022-11-29 PROCEDURE — G8484 FLU IMMUNIZE NO ADMIN: HCPCS | Performed by: NURSE PRACTITIONER

## 2022-11-29 PROCEDURE — 99213 OFFICE O/P EST LOW 20 MIN: CPT | Performed by: NURSE PRACTITIONER

## 2022-11-29 PROCEDURE — 4004F PT TOBACCO SCREEN RCVD TLK: CPT | Performed by: NURSE PRACTITIONER

## 2022-11-29 PROCEDURE — G8427 DOCREV CUR MEDS BY ELIG CLIN: HCPCS | Performed by: NURSE PRACTITIONER

## 2022-11-29 PROCEDURE — G8417 CALC BMI ABV UP PARAM F/U: HCPCS | Performed by: NURSE PRACTITIONER

## 2022-11-29 PROCEDURE — 1123F ACP DISCUSS/DSCN MKR DOCD: CPT | Performed by: NURSE PRACTITIONER

## 2022-11-29 RX ORDER — ROPINIROLE 0.25 MG/1
0.25 TABLET, FILM COATED ORAL DAILY
Qty: 90 TABLET | Refills: 3 | Status: SHIPPED | OUTPATIENT
Start: 2022-11-29

## 2022-12-01 DIAGNOSIS — E11.69 TYPE 2 DIABETES MELLITUS WITH OTHER SPECIFIED COMPLICATION, UNSPECIFIED WHETHER LONG TERM INSULIN USE (HCC): ICD-10-CM

## 2022-12-01 DIAGNOSIS — E03.4 HYPOTHYROIDISM DUE TO ACQUIRED ATROPHY OF THYROID: ICD-10-CM

## 2022-12-01 DIAGNOSIS — I15.9 HYPERTENSION, SECONDARY: ICD-10-CM

## 2022-12-01 LAB
A/G RATIO: 1.4 (ref 1.1–2.2)
ALBUMIN SERPL-MCNC: 4.3 G/DL (ref 3.4–5)
ALP BLD-CCNC: 156 U/L (ref 40–129)
ALT SERPL-CCNC: 17 U/L (ref 10–40)
ANION GAP SERPL CALCULATED.3IONS-SCNC: 16 MMOL/L (ref 3–16)
AST SERPL-CCNC: 20 U/L (ref 15–37)
BASOPHILS ABSOLUTE: 0.1 K/UL (ref 0–0.2)
BASOPHILS RELATIVE PERCENT: 0.5 %
BILIRUB SERPL-MCNC: 0.4 MG/DL (ref 0–1)
BUN BLDV-MCNC: 23 MG/DL (ref 7–20)
CALCIUM SERPL-MCNC: 9.8 MG/DL (ref 8.3–10.6)
CHLORIDE BLD-SCNC: 106 MMOL/L (ref 99–110)
CO2: 22 MMOL/L (ref 21–32)
CREAT SERPL-MCNC: 1.2 MG/DL (ref 0.8–1.3)
EOSINOPHILS ABSOLUTE: 0.1 K/UL (ref 0–0.6)
EOSINOPHILS RELATIVE PERCENT: 1.2 %
GFR SERPL CREATININE-BSD FRML MDRD: 59 ML/MIN/{1.73_M2}
GLUCOSE BLD-MCNC: 135 MG/DL (ref 70–99)
HCT VFR BLD CALC: 49.9 % (ref 40.5–52.5)
HEMOGLOBIN: 16.2 G/DL (ref 13.5–17.5)
LYMPHOCYTES ABSOLUTE: 2 K/UL (ref 1–5.1)
LYMPHOCYTES RELATIVE PERCENT: 20.3 %
MCH RBC QN AUTO: 32.1 PG (ref 26–34)
MCHC RBC AUTO-ENTMCNC: 32.6 G/DL (ref 31–36)
MCV RBC AUTO: 98.7 FL (ref 80–100)
MONOCYTES ABSOLUTE: 0.9 K/UL (ref 0–1.3)
MONOCYTES RELATIVE PERCENT: 9.2 %
NEUTROPHILS ABSOLUTE: 6.7 K/UL (ref 1.7–7.7)
NEUTROPHILS RELATIVE PERCENT: 68.8 %
PDW BLD-RTO: 14.5 % (ref 12.4–15.4)
PLATELET # BLD: 181 K/UL (ref 135–450)
PMV BLD AUTO: 10.9 FL (ref 5–10.5)
POTASSIUM SERPL-SCNC: 5 MMOL/L (ref 3.5–5.1)
RBC # BLD: 5.06 M/UL (ref 4.2–5.9)
SODIUM BLD-SCNC: 144 MMOL/L (ref 136–145)
TOTAL PROTEIN: 7.4 G/DL (ref 6.4–8.2)
TSH SERPL DL<=0.05 MIU/L-ACNC: 4.82 UIU/ML (ref 0.27–4.2)
WBC # BLD: 9.8 K/UL (ref 4–11)

## 2022-12-02 LAB
ESTIMATED AVERAGE GLUCOSE: 157.1 MG/DL
HBA1C MFR BLD: 7.1 %

## 2022-12-05 ENCOUNTER — OFFICE VISIT (OUTPATIENT)
Dept: INTERNAL MEDICINE CLINIC | Age: 82
End: 2022-12-05
Payer: MEDICARE

## 2022-12-05 VITALS
SYSTOLIC BLOOD PRESSURE: 122 MMHG | WEIGHT: 205 LBS | DIASTOLIC BLOOD PRESSURE: 70 MMHG | HEART RATE: 65 BPM | HEIGHT: 70 IN | OXYGEN SATURATION: 95 % | BODY MASS INDEX: 29.35 KG/M2

## 2022-12-05 DIAGNOSIS — E03.4 HYPOTHYROIDISM DUE TO ACQUIRED ATROPHY OF THYROID: ICD-10-CM

## 2022-12-05 DIAGNOSIS — E78.2 MIXED HYPERLIPIDEMIA: ICD-10-CM

## 2022-12-05 DIAGNOSIS — G25.2 RESTING TREMOR: ICD-10-CM

## 2022-12-05 DIAGNOSIS — I15.9 HYPERTENSION, SECONDARY: ICD-10-CM

## 2022-12-05 DIAGNOSIS — E11.69 TYPE 2 DIABETES MELLITUS WITH OTHER SPECIFIED COMPLICATION, UNSPECIFIED WHETHER LONG TERM INSULIN USE (HCC): Primary | ICD-10-CM

## 2022-12-05 DIAGNOSIS — Z79.01 LONG TERM CURRENT USE OF ANTICOAGULANT THERAPY: ICD-10-CM

## 2022-12-05 LAB
INTERNATIONAL NORMALIZATION RATIO, POC: 1.9
PROTHROMBIN TIME, POC: 22.4

## 2022-12-05 PROCEDURE — 4004F PT TOBACCO SCREEN RCVD TLK: CPT | Performed by: FAMILY MEDICINE

## 2022-12-05 PROCEDURE — G8484 FLU IMMUNIZE NO ADMIN: HCPCS | Performed by: FAMILY MEDICINE

## 2022-12-05 PROCEDURE — 99214 OFFICE O/P EST MOD 30 MIN: CPT | Performed by: FAMILY MEDICINE

## 2022-12-05 PROCEDURE — G8427 DOCREV CUR MEDS BY ELIG CLIN: HCPCS | Performed by: FAMILY MEDICINE

## 2022-12-05 PROCEDURE — G8417 CALC BMI ABV UP PARAM F/U: HCPCS | Performed by: FAMILY MEDICINE

## 2022-12-05 PROCEDURE — 1123F ACP DISCUSS/DSCN MKR DOCD: CPT | Performed by: FAMILY MEDICINE

## 2022-12-05 PROCEDURE — 3051F HG A1C>EQUAL 7.0%<8.0%: CPT | Performed by: FAMILY MEDICINE

## 2022-12-05 PROCEDURE — 85610 PROTHROMBIN TIME: CPT | Performed by: FAMILY MEDICINE

## 2022-12-05 RX ORDER — LEVOTHYROXINE SODIUM 0.05 MG/1
50 TABLET ORAL DAILY
Qty: 90 TABLET | Refills: 1 | Status: SHIPPED | OUTPATIENT
Start: 2022-12-05

## 2022-12-05 ASSESSMENT — ENCOUNTER SYMPTOMS
CONSTIPATION: 0
SORE THROAT: 0
COLOR CHANGE: 0
SHORTNESS OF BREATH: 0
DIARRHEA: 0
VOMITING: 0
CHEST TIGHTNESS: 0
ABDOMINAL PAIN: 0

## 2022-12-05 NOTE — PROGRESS NOTES
Subjective:      Chief Complaint   Patient presents with    Follow-up     4 months-no complaints        HPI:  Sari Turner is a 78 y.o. male who presents today for follow up of chronic conditions as listed below. Tremor:  Gabapentin dose was increased to 300mg TID at his last neurology appointment. Has had some improvement in symptoms so neurologist was planning on increasing dose further as long as renal function was stable. INR:  1.9 today. Is taking coumadin 2mg daily. Hypothyroidism:  Has been having more irritated, dry skin (mostly on his legs). COPD:  Does not use advair. Is using albuterol as needed, but is not frequent. DM:  Does not check glucose. Patient was having double vision a few months ago, but states it resolved on its own so he did not see his eye doctor. Has not had any recurrence. Feeling well today, no acute concerns. Labs reviewed.       Past Medical History:   Diagnosis Date    Acid reflux     Adenomatous polyp of colon 1-2009    last colonoscopy recomm 3 yr follow up    Anticoagulated 2010    takes for afib Dr Jimbo Dennis monitors INR 1.2 on 02/08/2018    Arthritis     \"arthritis in low back\"    CAD (coronary artery disease)     follow with dr Mo Kirkland- cardiac clearance for surgery done 10/31/2017 on chart)    Chronic atrial fibrillation (HCC)     Sees Dr. Bryce Hein    COPD (chronic obstructive pulmonary disease) ([facility] [address]     follows with dr Haresh Wu    Diverticulitis     Diverticulosis of colon     left colon    Enlarged prostate     \"had to ream me out a couple of times\":    H/O cardiac catheterization 1/31/2006    R codominant, RCA patent, LAD patent with minimal nonobstructive irreg, CIRC patent and codominant, RI small but patent    H/O cardiovascular stress test 5/18/10    EF81% decrease in perfusion in inferoseptal segments c/w paced rhythm, LV small in volume    H/O cardiovascular stress test 5/5/2016    lexiscan-normal,EF70%    H/O cardiovascular stress test 6/2/2016    treadmill    H/O Doppler Carotid ultrasound 11/01/2019    Mild (0-49%) disease of the Bilateral proximal Internal carotid artery. H/O Doppler ultrasound 06/15/2016    minimal plaque throughout bilaterally    H/O echocardiogram 5/3/16    EF 50-55%. Mild concentric LV hypertrophy with low normal systolic function. Mod bilateral atrial and right ventricle dilatation. Mild AR, MR, TR, [place]. Absence of pericardial effsion. H/O echocardiogram 8/4/11    EF(not given in faxed records) mild MR, mod LAE, mild AR    H/O echocardiogram 09/18/2019    EF 55-60%,  The left atrium is moderate to severely dilated. H/O percutaneous left heart catheterization 5/12/16    left circumflex is 60% very distal stenosis at the bifurcation, OM1 is 99% mid segment stenosis    H/O unstable angina     History of Doppler ultrasound 3/29/12    Abd Ao Duplex - no abdominal aoritic aneurysm or iliac aneurysm identified    History of Doppler ultrasound 10/19/10    carotid - 0-19% bilateral ICAs, R and L carotid system show minimal atheromatous diseassse without stenosis    History of Holter monitoring 10/19/10    rhythm is ventricular paced with underlying a-fib    History of nuclear stress test 02/06/2020    Normal study.     Hx of CT scan of chest 8/17/10    mod-severe centrilobular emphysematous changes    HX OTHER MEDICAL     Primary Care Physician Is Dr. Sal Ko    Hyperlipidemia     Hypertension     Hypothyroidism due to acquired atrophy of thyroid 2/29/2020    Kidney stones \"Been Having Kidney Stones For 39 Years\"    Passed Kidney Stones Several Times    Nephrolithiasis     with hematuria episodes followed by Dr Jemal Diaz    Nocturia     Pacemaker 8-2416    S/P AV ablation    Peptic ulcer, unspecified site, unspecified as acute or chronic, without mention of hemorrhage or perforation 1983    per EGD    Pneumonia Dx 2000's    Shortness of breath     Skin Cancer Arms And Ears In Past    Excision Skin Cancer Arms And Ears    Sleep apnea     No CPAP- had sleep study done 10+ yrs ago    Stented coronary artery 5/12/16    3.0 X 12 MM ELIGIO stent to OM 1    Teeth missing     Lower    Type 2 diabetes mellitus with other specified complication, unspecified whether long term insulin use ([facility] [address] 4/25/2022    Wears dentures     Full Lower    Wears glasses         Past Surgical History:   Procedure Laterality Date    CARDIAC PACEMAKER PLACEMENT  5-3-99, 1-29-14    Medtronic Sensia SR Pacemaker Implanted    COLONOSCOPY  8-12 - Dr Lala Foley    Polyps Removed In Past    CORONARY ANGIOPLASTY WITH STENT PLACEMENT      per old chart had cath with stent placement done 5/2016    CYSTOSCOPY Left 11/17/2017    cysto, left retrograde, flugeration of prostate    DENTAL SURGERY      Teeth Extracted In Past    ENDOSCOPY, COLON, DIAGNOSTIC  1983    KNEE SURGERY Right 1966    \"shaved the kneecap\"    LITHOTRIPSY  2009, 2014    Kidney Stones    PACEMAKER CHANGE N/A 12/13/2021    PACEMAKER GENERATOR CHANGE performed by Emelyn Zarate MD ritchie [facility] OR/INDIRA PACER BUT OLD LEADS ARE NOT MRI COMPATIBLE    PACEMAKER PLACEMENT  05/03/1999    Medtronic Sensia SR Pacemaker Implanted    PACEMAKER PLACEMENT  01/29/2014    Medtronic Sensia SR Pacemaker Implanted( had to replace the whole thing in 2014- that was my 3rd one - first one in [address] [place] [address] and next one 2007\"    SKIN CANCER EXCISION  In Past    Skin Cancer Excised Arms And Ears       Social History     Tobacco Use    Smoking status: Every Day     Packs/day: 0.50     Years: 74.00     Pack years: 37.00     Types: Cigarettes     Start date: [birth year]/1948    Smokeless tobacco: Never   Substance Use Topics    Alcohol use: No     Alcohol/week: 0.0 standard drinks        Review of Systems   Constitutional:  Negative for activity change, appetite change, chills, fever and unexpected weight change. HENT:  Negative for congestion and sore throat. Respiratory:  Negative for chest tightness and shortness of breath. Cardiovascular:  Negative for chest pain and palpitations. Gastrointestinal:  Negative for abdominal pain, constipation, diarrhea and vomiting. Genitourinary:  Negative for dysuria. Skin:  Negative for color change. Neurological:  Negative for dizziness and light-headedness. Psychiatric/Behavioral:  Negative for dysphoric mood. The patient is not nervous/anxious. Prior to Visit Medications    Medication Sig Taking? Authorizing Provider   rOPINIRole (REQUIP) 0.25 MG tablet Take 1 tablet by mouth daily Yes Samuel Capps MD   zonisamide (ZONEGRAN) 100 MG capsule TAKE 2 CAPSULES BY MOUTH AT BEDTIME Yes ORVILLE Connor CNP   levothyroxine (SYNTHROID) 25 MCG tablet TAKE 1 TABLET BY MOUTH EVERY DAY Yes Samuel Capps MD   trihexyphenidyl (ARTANE) 2 MG tablet TAKE 1 TABLET BY MOUTH EVERY DAY Yes Samuel Capps MD   gabapentin (NEURONTIN) 300 MG capsule Take 1 capsule by mouth 3 times daily for 30 days.  Yes ORVILLE Connor CNP   famotidine (PEPCID) 20 MG tablet TAKE 1/2 TABLET BY MOUTH TWICE A DAY Yes Samuel Capps MD   PRALUENT 75 MG/ML SOAJ injection pen INJECT 1 ML INTO THE SKIN EVERY 14 DAYS Yes Samuel Capps MD   warfarin (COUMADIN) 2 MG tablet TAKE 1 TABLET BY MOUTH EVERY DAY Yes Samuel Capps MD   albuterol sulfate HFA (VENTOLIN HFA) 108 (90 Base) MCG/ACT inhaler Inhale 2 puffs into the lungs every 6 hours as needed for Wheezing or Shortness of Breath Yes Samuel Capps MD   amLODIPine (NORVASC) 10 MG tablet TAKE 1 TABLET BY MOUTH EVERY DAY Yes Samuel Capps MD   metoprolol succinate (TOPROL XL) 25 MG extended release tablet  Yes Historical Provider, MD   trimethoprim (TRIMPEX) 100 MG tablet  Yes Historical Provider, MD   UNABLE TO FIND GSH-3 Cell Defense Yes Historical Provider, Edward Huber, (SAW PALMETTO BERRY PO) Take by mouth Yes Historical Provider, MD   MAGNESIUM CHLORIDE PO Take 1 each by mouth daily  Yes Historical Provider, MD   aspirin 81 MG EC tablet Take 1 tablet by mouth daily Yes Munir Salcedo MD   Multiple Vitamins-Minerals (CENTRUM PO) Take 1 tablet by mouth nightly Over The Counter  Yes Historical Provider, MD   Cholecalciferol (VITAMIN D PO) Take 2,000 Units by mouth 2 times daily Over The Counter  Yes Historical Provider, MD   fluticasone-salmeterol (ADVAIR HFA) 230-21 MCG/ACT inhaler Inhale 2 puffs into the lungs 2 times daily  Patient not taking: No sig reported  Cathy Patel MD          Objective:      /70 (Site: Left Upper Arm, Position: Sitting, Cuff Size: Medium Adult)   Pulse 65   Ht 5' 10\" (1.778 m)   Wt 205 lb (93 kg)   SpO2 95%   BMI 29.41 kg/m²      Physical Exam  Vitals and nursing note reviewed. Constitutional:       General: He is not in acute distress. Appearance: Normal appearance. He is not ill-appearing or toxic-appearing. HENT:      Head: Normocephalic and atraumatic. Right Ear: External ear normal.      Left Ear: External ear normal.      Nose: Nose normal.      Mouth/Throat:      Pharynx: Oropharynx is clear. Eyes:      General: No scleral icterus. Right eye: No discharge. Left eye: No discharge. Extraocular Movements: Extraocular movements intact. Conjunctiva/sclera: Conjunctivae normal.   Cardiovascular:      Rate and Rhythm: Normal rate and regular rhythm. Heart sounds: Normal heart sounds. Pulmonary:      Effort: Pulmonary effort is normal.      Breath sounds: Normal breath sounds. No wheezing or rales. Musculoskeletal:         General: No deformity. Cervical back: Normal range of motion and neck supple. No rigidity. Skin:     General: Skin is warm and dry. Findings: No rash. Neurological:      General: No focal deficit present. Mental Status: He is alert. Mental status is at baseline. Motor: No weakness.    Psychiatric:         Mood and Affect: Mood normal. Behavior: Behavior normal.          Assessment / Plan:      1. Type 2 diabetes mellitus with other specified complication, unspecified whether long term insulin use (HCC)  Slight increase in HbA1c, but still well controlled for age. Will keep off of medication and continue to monitor.   - Hemoglobin A1C; Future    2. Long term current use of anticoagulant therapy  INR slightly subtherapeutic today. Start 1.5tabs on Sundays, continue 2mg the other 6 days of the week. Will repeat in 2 weeks. - POCT INR    3. Hypothyroidism due to acquired atrophy of thyroid  TSH elevated. Given skin changes, will increase dose to 50mcg and repeat labs before next appointment. - levothyroxine (SYNTHROID) 50 MCG tablet; Take 1 tablet by mouth daily  Dispense: 90 tablet; Refill: 1  - TSH; Future    4. Hypertension, secondary  Stable, well controlled. Continue current medications. - CBC with Auto Differential; Future  - Comprehensive Metabolic Panel; Future    5. Resting tremor  Improving with increased dose of gabapentin. Continue following with neurology. 6. Mixed hyperlipidemia  Lipid panel stable, continue praluent and ASA. - Lipid Panel; Future          I have spent 27 minutes on this patient encounter. Patient voiced understanding and agreement with plan. All questions/concerns were addressed, risks/side effects of medications were reviewed. Return precautions and after visit summary were provided. Return in about 4 months (around 4/5/2023). or earlier as needed.       Lee Alexis MD

## 2022-12-05 NOTE — PROGRESS NOTES
Per PCP: 12/5/2022-Pt is to take 3 1/2mg on Sundays and 2 mg all days of the week. Recheck INR in 2 weeks. Pt instructed and voiced understanding.

## 2022-12-06 ENCOUNTER — TELEPHONE (OUTPATIENT)
Dept: NEUROLOGY | Age: 82
End: 2022-12-06

## 2022-12-06 NOTE — TELEPHONE ENCOUNTER
Pt wife stopped in office with copy of most recent lab results dated 7/29/22. Per last ov note, to send in rx to increase gabapentin to 400mg tid if kidney function still stable. Labs from 7/29/22 scanned to chart. Please advise.

## 2022-12-09 DIAGNOSIS — G25.3 MYOCLONUS: Primary | ICD-10-CM

## 2022-12-09 RX ORDER — GABAPENTIN 300 MG/1
300 CAPSULE ORAL 4 TIMES DAILY
Qty: 120 CAPSULE | Refills: 5 | Status: SHIPPED | OUTPATIENT
Start: 2022-12-09 | End: 2023-04-08

## 2022-12-09 NOTE — TELEPHONE ENCOUNTER
Pt did have labs completed 12/1/22 although wife dropped off 7/29 results. The results are under a different provider. Please review and advise.

## 2022-12-12 DIAGNOSIS — E03.4 HYPOTHYROIDISM DUE TO ACQUIRED ATROPHY OF THYROID: ICD-10-CM

## 2022-12-13 RX ORDER — LEVOTHYROXINE SODIUM 0.05 MG/1
50 TABLET ORAL DAILY
Qty: 90 TABLET | Refills: 1 | OUTPATIENT
Start: 2022-12-13

## 2022-12-20 ENCOUNTER — TELEPHONE (OUTPATIENT)
Dept: NEUROLOGY | Age: 82
End: 2022-12-20

## 2022-12-20 DIAGNOSIS — G25.3 MYOCLONUS: ICD-10-CM

## 2022-12-20 RX ORDER — GABAPENTIN 300 MG/1
300 CAPSULE ORAL 4 TIMES DAILY
Qty: 360 CAPSULE | Refills: 5 | Status: SHIPPED | OUTPATIENT
Start: 2022-12-20 | End: 2023-12-01

## 2023-01-06 DIAGNOSIS — J44.9 MODERATE COPD (CHRONIC OBSTRUCTIVE PULMONARY DISEASE) (HCC): ICD-10-CM

## 2023-01-06 RX ORDER — ALBUTEROL SULFATE 90 UG/1
2 AEROSOL, METERED RESPIRATORY (INHALATION) EVERY 6 HOURS PRN
Qty: 54 EACH | Refills: 1 | Status: SHIPPED | OUTPATIENT
Start: 2023-01-06

## 2023-01-11 ENCOUNTER — NURSE ONLY (OUTPATIENT)
Dept: INTERNAL MEDICINE CLINIC | Age: 83
End: 2023-01-11
Payer: MEDICARE

## 2023-01-11 DIAGNOSIS — Z79.01 LONG TERM CURRENT USE OF ANTICOAGULANT THERAPY: Primary | ICD-10-CM

## 2023-01-11 LAB
INTERNATIONAL NORMALIZATION RATIO, POC: 2.9
PROTHROMBIN TIME, POC: 35

## 2023-01-11 PROCEDURE — 85610 PROTHROMBIN TIME: CPT | Performed by: FAMILY MEDICINE

## 2023-01-11 NOTE — PROGRESS NOTES
01/11/2023 Per Patient: 3 mg on Sunday and 2 mg all other days  Per PCP: continue same dose   Recheck INR in 4 weeks.

## 2023-01-13 RX ORDER — AMLODIPINE BESYLATE 10 MG/1
TABLET ORAL
Qty: 90 TABLET | Refills: 3 | Status: SHIPPED | OUTPATIENT
Start: 2023-01-13

## 2023-02-08 ENCOUNTER — NURSE ONLY (OUTPATIENT)
Dept: INTERNAL MEDICINE CLINIC | Age: 83
End: 2023-02-08

## 2023-02-08 DIAGNOSIS — Z79.01 LONG TERM CURRENT USE OF ANTICOAGULANT THERAPY: Primary | ICD-10-CM

## 2023-02-08 LAB
INTERNATIONAL NORMALIZATION RATIO, POC: 3.2
PROTHROMBIN TIME, POC: 38.3

## 2023-02-08 NOTE — PROGRESS NOTES
Pt here for INR. Pt states he takes 3mg Warfarin on Sundays and 2mg all other days. Per RISA Coates - take 2 mg warfarin everyday and RTC in 2 weeks to recheck INR. Pt instructed and voiced understanding.

## 2023-02-20 RX ORDER — WARFARIN SODIUM 2 MG/1
TABLET ORAL
Qty: 90 TABLET | Refills: 1 | Status: SHIPPED | OUTPATIENT
Start: 2023-02-20

## 2023-02-22 ENCOUNTER — NURSE ONLY (OUTPATIENT)
Dept: INTERNAL MEDICINE CLINIC | Age: 83
End: 2023-02-22

## 2023-02-22 DIAGNOSIS — Z79.01 LONG TERM CURRENT USE OF ANTICOAGULANT THERAPY: Primary | ICD-10-CM

## 2023-02-22 LAB
INTERNATIONAL NORMALIZATION RATIO, POC: 2.7
PROTHROMBIN TIME, POC: 32.5

## 2023-02-22 NOTE — PROGRESS NOTES
Pt came in on this date for INR    INR: 2.7  32.5    02/08/2023 Per CNP 2 mg all days  Per PCP: continue same dose   Recheck INR in 2 weeks.      2/22/2023 per PCP: Keep dose the same 2 mg everyday, return in 6 weeks at next appt

## 2023-03-01 DIAGNOSIS — G25.2 RESTING TREMOR: ICD-10-CM

## 2023-03-02 RX ORDER — TRIHEXYPHENIDYL HYDROCHLORIDE 2 MG/1
TABLET ORAL
Qty: 90 TABLET | Refills: 1 | Status: SHIPPED | OUTPATIENT
Start: 2023-03-02

## 2023-03-02 RX ORDER — ZONISAMIDE 100 MG/1
CAPSULE ORAL
Qty: 180 CAPSULE | Refills: 1 | Status: SHIPPED | OUTPATIENT
Start: 2023-03-02

## 2023-03-02 RX ORDER — ALIROCUMAB 75 MG/ML
75 INJECTION, SOLUTION SUBCUTANEOUS
Qty: 6 ADJUSTABLE DOSE PRE-FILLED PEN SYRINGE | Refills: 1 | Status: SHIPPED | OUTPATIENT
Start: 2023-03-02

## 2023-03-02 NOTE — TELEPHONE ENCOUNTER
Patient needs a refill of the following.     Requested Prescriptions     Pending Prescriptions Disp Refills    zonisamide (ZONEGRAN) 100 MG capsule [Pharmacy Med Name: ZONISAMIDE 100 MG CAPSULE] 180 capsule 1     Sig: TAKE 2 CAPSULES BY MOUTH AT BEDTIME

## 2023-03-14 ENCOUNTER — TELEPHONE (OUTPATIENT)
Dept: NEUROLOGY | Age: 83
End: 2023-03-14

## 2023-03-20 ENCOUNTER — OFFICE VISIT (OUTPATIENT)
Dept: NEUROLOGY | Age: 83
End: 2023-03-20
Payer: MEDICARE

## 2023-03-20 VITALS
WEIGHT: 205 LBS | BODY MASS INDEX: 29.35 KG/M2 | OXYGEN SATURATION: 97 % | DIASTOLIC BLOOD PRESSURE: 60 MMHG | SYSTOLIC BLOOD PRESSURE: 116 MMHG | HEART RATE: 80 BPM | HEIGHT: 70 IN

## 2023-03-20 DIAGNOSIS — G25.3 MYOCLONUS: ICD-10-CM

## 2023-03-20 PROCEDURE — G8427 DOCREV CUR MEDS BY ELIG CLIN: HCPCS | Performed by: NURSE PRACTITIONER

## 2023-03-20 PROCEDURE — 1123F ACP DISCUSS/DSCN MKR DOCD: CPT | Performed by: NURSE PRACTITIONER

## 2023-03-20 PROCEDURE — G8484 FLU IMMUNIZE NO ADMIN: HCPCS | Performed by: NURSE PRACTITIONER

## 2023-03-20 PROCEDURE — G8417 CALC BMI ABV UP PARAM F/U: HCPCS | Performed by: NURSE PRACTITIONER

## 2023-03-20 PROCEDURE — 99214 OFFICE O/P EST MOD 30 MIN: CPT | Performed by: NURSE PRACTITIONER

## 2023-03-20 PROCEDURE — 4004F PT TOBACCO SCREEN RCVD TLK: CPT | Performed by: NURSE PRACTITIONER

## 2023-03-20 RX ORDER — GABAPENTIN 400 MG/1
400 CAPSULE ORAL 4 TIMES DAILY
Qty: 120 CAPSULE | Refills: 3 | Status: SHIPPED | OUTPATIENT
Start: 2023-03-20 | End: 2024-03-19

## 2023-03-27 ENCOUNTER — TELEPHONE (OUTPATIENT)
Dept: INTERNAL MEDICINE CLINIC | Age: 83
End: 2023-03-27

## 2023-03-28 ENCOUNTER — TELEPHONE (OUTPATIENT)
Dept: NEUROLOGY | Age: 83
End: 2023-03-28

## 2023-03-28 DIAGNOSIS — R25.1 TREMOR: Primary | ICD-10-CM

## 2023-03-28 DIAGNOSIS — G25.3 MYOCLONUS: ICD-10-CM

## 2023-03-28 NOTE — TELEPHONE ENCOUNTER
Patient's wife called in stating the change to Gabapentin was not helping the patient at all and wanted some sort of recommendation of what to do next.

## 2023-03-29 DIAGNOSIS — I15.9 HYPERTENSION, SECONDARY: ICD-10-CM

## 2023-03-29 DIAGNOSIS — E11.69 TYPE 2 DIABETES MELLITUS WITH OTHER SPECIFIED COMPLICATION, UNSPECIFIED WHETHER LONG TERM INSULIN USE (HCC): ICD-10-CM

## 2023-03-29 DIAGNOSIS — E03.4 HYPOTHYROIDISM DUE TO ACQUIRED ATROPHY OF THYROID: ICD-10-CM

## 2023-03-29 DIAGNOSIS — E78.2 MIXED HYPERLIPIDEMIA: ICD-10-CM

## 2023-03-29 LAB
ALBUMIN SERPL-MCNC: 4.2 G/DL (ref 3.4–5)
ALBUMIN/GLOB SERPL: 1.4 {RATIO} (ref 1.1–2.2)
ALP SERPL-CCNC: 158 U/L (ref 40–129)
ALT SERPL-CCNC: 36 U/L (ref 10–40)
ANION GAP SERPL CALCULATED.3IONS-SCNC: 12 MMOL/L (ref 3–16)
AST SERPL-CCNC: 19 U/L (ref 15–37)
BASOPHILS # BLD: 0.1 K/UL (ref 0–0.2)
BASOPHILS NFR BLD: 0.7 %
BILIRUB SERPL-MCNC: 0.5 MG/DL (ref 0–1)
BUN SERPL-MCNC: 23 MG/DL (ref 7–20)
CALCIUM SERPL-MCNC: 9.3 MG/DL (ref 8.3–10.6)
CHLORIDE SERPL-SCNC: 107 MMOL/L (ref 99–110)
CHOLEST SERPL-MCNC: 147 MG/DL (ref 0–199)
CO2 SERPL-SCNC: 23 MMOL/L (ref 21–32)
CREAT SERPL-MCNC: 1.3 MG/DL (ref 0.8–1.3)
DEPRECATED RDW RBC AUTO: 14.6 % (ref 12.4–15.4)
EOSINOPHIL # BLD: 0.2 K/UL (ref 0–0.6)
EOSINOPHIL NFR BLD: 2.2 %
GFR SERPLBLD CREATININE-BSD FMLA CKD-EPI: 54 ML/MIN/{1.73_M2}
GLUCOSE SERPL-MCNC: 128 MG/DL (ref 70–99)
HCT VFR BLD AUTO: 47 % (ref 40.5–52.5)
HDLC SERPL-MCNC: 64 MG/DL (ref 40–60)
HGB BLD-MCNC: 15.6 G/DL (ref 13.5–17.5)
LDLC SERPL CALC-MCNC: 59 MG/DL
LYMPHOCYTES # BLD: 1.6 K/UL (ref 1–5.1)
LYMPHOCYTES NFR BLD: 21.9 %
MCH RBC QN AUTO: 32.3 PG (ref 26–34)
MCHC RBC AUTO-ENTMCNC: 33.2 G/DL (ref 31–36)
MCV RBC AUTO: 97.4 FL (ref 80–100)
MONOCYTES # BLD: 0.8 K/UL (ref 0–1.3)
MONOCYTES NFR BLD: 11.4 %
NEUTROPHILS # BLD: 4.6 K/UL (ref 1.7–7.7)
NEUTROPHILS NFR BLD: 63.8 %
PLATELET # BLD AUTO: 161 K/UL (ref 135–450)
PLATELET BLD QL SMEAR: ADEQUATE
PMV BLD AUTO: 10.3 FL (ref 5–10.5)
POTASSIUM SERPL-SCNC: 4.7 MMOL/L (ref 3.5–5.1)
PROT SERPL-MCNC: 7.2 G/DL (ref 6.4–8.2)
RBC # BLD AUTO: 4.83 M/UL (ref 4.2–5.9)
SLIDE REVIEW: NORMAL
SODIUM SERPL-SCNC: 142 MMOL/L (ref 136–145)
TRIGL SERPL-MCNC: 118 MG/DL (ref 0–150)
TSH SERPL DL<=0.005 MIU/L-ACNC: 3.4 UIU/ML (ref 0.27–4.2)
VLDLC SERPL CALC-MCNC: 24 MG/DL
WBC # BLD AUTO: 7.2 K/UL (ref 4–11)

## 2023-03-30 LAB
EST. AVERAGE GLUCOSE BLD GHB EST-MCNC: 157.1 MG/DL
HBA1C MFR BLD: 7.1 %

## 2023-03-31 RX ORDER — DEUTETRABENAZINE 6-9-12 MG
KIT ORAL
Qty: 1 EACH | Refills: 0 | COMMUNITY
Start: 2023-03-31

## 2023-03-31 NOTE — TELEPHONE ENCOUNTER
Verified pt with ID and gave [employer] starter kit with note stating do not take week four. Also reiterated to patient and removed week four from the pack.

## 2023-04-04 ENCOUNTER — OFFICE VISIT (OUTPATIENT)
Dept: INTERNAL MEDICINE CLINIC | Age: 83
End: 2023-04-04
Payer: MEDICARE

## 2023-04-04 VITALS
BODY MASS INDEX: 29.2 KG/M2 | OXYGEN SATURATION: 95 % | SYSTOLIC BLOOD PRESSURE: 130 MMHG | WEIGHT: 204 LBS | DIASTOLIC BLOOD PRESSURE: 68 MMHG | HEART RATE: 81 BPM | HEIGHT: 70 IN

## 2023-04-04 DIAGNOSIS — N18.31 STAGE 3A CHRONIC KIDNEY DISEASE (HCC): ICD-10-CM

## 2023-04-04 DIAGNOSIS — I48.91 ATRIAL FIBRILLATION, UNSPECIFIED TYPE (HCC): Primary | ICD-10-CM

## 2023-04-04 DIAGNOSIS — Z79.01 LONG TERM CURRENT USE OF ANTICOAGULANT THERAPY: ICD-10-CM

## 2023-04-04 DIAGNOSIS — J44.9 MODERATE COPD (CHRONIC OBSTRUCTIVE PULMONARY DISEASE) (HCC): ICD-10-CM

## 2023-04-04 DIAGNOSIS — E11.69 TYPE 2 DIABETES MELLITUS WITH OTHER SPECIFIED COMPLICATION, UNSPECIFIED WHETHER LONG TERM INSULIN USE (HCC): ICD-10-CM

## 2023-04-04 DIAGNOSIS — G25.2 RESTING TREMOR: ICD-10-CM

## 2023-04-04 DIAGNOSIS — E03.4 HYPOTHYROIDISM DUE TO ACQUIRED ATROPHY OF THYROID: ICD-10-CM

## 2023-04-04 DIAGNOSIS — Z00.00 MEDICARE ANNUAL WELLNESS VISIT, SUBSEQUENT: ICD-10-CM

## 2023-04-04 LAB
INTERNATIONAL NORMALIZATION RATIO, POC: 3.2
PROTHROMBIN TIME, POC: 38.2

## 2023-04-04 PROCEDURE — 1123F ACP DISCUSS/DSCN MKR DOCD: CPT | Performed by: FAMILY MEDICINE

## 2023-04-04 PROCEDURE — 3051F HG A1C>EQUAL 7.0%<8.0%: CPT | Performed by: FAMILY MEDICINE

## 2023-04-04 PROCEDURE — G0439 PPPS, SUBSEQ VISIT: HCPCS | Performed by: FAMILY MEDICINE

## 2023-04-04 PROCEDURE — 85610 PROTHROMBIN TIME: CPT | Performed by: FAMILY MEDICINE

## 2023-04-04 SDOH — ECONOMIC STABILITY: FOOD INSECURITY: WITHIN THE PAST 12 MONTHS, YOU WORRIED THAT YOUR FOOD WOULD RUN OUT BEFORE YOU GOT MONEY TO BUY MORE.: NEVER TRUE

## 2023-04-04 SDOH — ECONOMIC STABILITY: FOOD INSECURITY: WITHIN THE PAST 12 MONTHS, THE FOOD YOU BOUGHT JUST DIDN'T LAST AND YOU DIDN'T HAVE MONEY TO GET MORE.: NEVER TRUE

## 2023-04-04 SDOH — ECONOMIC STABILITY: HOUSING INSECURITY
IN THE LAST 12 MONTHS, WAS THERE A TIME WHEN YOU DID NOT HAVE A STEADY PLACE TO SLEEP OR SLEPT IN A SHELTER (INCLUDING NOW)?: NO

## 2023-04-04 SDOH — ECONOMIC STABILITY: INCOME INSECURITY: HOW HARD IS IT FOR YOU TO PAY FOR THE VERY BASICS LIKE FOOD, HOUSING, MEDICAL CARE, AND HEATING?: NOT HARD AT ALL

## 2023-04-04 ASSESSMENT — PATIENT HEALTH QUESTIONNAIRE - PHQ9
SUM OF ALL RESPONSES TO PHQ QUESTIONS 1-9: 0
SUM OF ALL RESPONSES TO PHQ QUESTIONS 1-9: 0
1. LITTLE INTEREST OR PLEASURE IN DOING THINGS: 0
2. FEELING DOWN, DEPRESSED OR HOPELESS: 0
SUM OF ALL RESPONSES TO PHQ9 QUESTIONS 1 & 2: 0
SUM OF ALL RESPONSES TO PHQ QUESTIONS 1-9: 0
SUM OF ALL RESPONSES TO PHQ QUESTIONS 1-9: 0

## 2023-04-04 ASSESSMENT — LIFESTYLE VARIABLES
HOW MANY STANDARD DRINKS CONTAINING ALCOHOL DO YOU HAVE ON A TYPICAL DAY: PATIENT DOES NOT DRINK
HOW OFTEN DO YOU HAVE A DRINK CONTAINING ALCOHOL: NEVER

## 2023-04-04 NOTE — PROGRESS NOTES
Pt came in on this date for 4 month f/u with PCP & INR was performed     2/22/2023 per PCP: Keep dose the same 2 mg everyday, return in 6 weeks at next appt     4/4/2023: Pt reported taking 2 mg every evening   Per PCP: take 1 mg on Sundays  (half a tab)  2 mg all other days.  Repeat in 2 weeks
Pt has 2 mg Warfarin. He is to start taking 0.5 tab on Sunday, and 1 tab on other 6 days. Recheck in 2 weeks.
levothyroxine (SYNTHROID) 50 MCG tablet Take 1 tablet by mouth daily Yes Bernadette Ashley MD   rOPINIRole (REQUIP) 0.25 MG tablet Take 1 tablet by mouth daily Yes Bernadette Ashley MD   famotidine (PEPCID) 20 MG tablet TAKE 1/2 TABLET BY MOUTH TWICE A DAY Yes Bernadette Ashley MD   metoprolol succinate (TOPROL XL) 25 MG extended release tablet  Yes Historical Provider, MD   trimethoprim (TRIMPEX) 100 MG tablet  Yes Historical Provider, MD   UNABLE TO FIND GSH-3 Cell Defense Yes Historical Provider, Edward Huber repens, (SAW PALMETTO BERRY PO) Take by mouth Yes Historical Provider, MD   MAGNESIUM CHLORIDE PO Take 1 each by mouth daily  Yes Historical Provider, MD   aspirin 81 MG EC tablet Take 1 tablet by mouth daily Yes Brown Haley MD   Multiple Vitamins-Minerals (CENTRUM PO) Take 1 tablet by mouth nightly Over The Counter  Yes Historical Provider, MD   Cholecalciferol (VITAMIN D PO) Take 2,000 Units by mouth 2 times daily Over The Counter  Yes Historical Provider, MD Olsen (Including outside providers/suppliers regularly involved in providing care):   Patient Care Team:  Bernadette Ashley MD as PCP - General (Family Medicine)  Bernadette Ashley MD as PCP - Empaneled Provider  Giovanni Joya MD as Consulting Physician (Pulmonology)  Mindy Lehman MD as Consulting Physician (Cardiology)  Brown Haley MD as Consulting Physician (Cardiology)     Reviewed and updated this visit:  Tobacco  Allergies  Meds  Med Hx  Surg Hx  Soc Hx  Fam Hx             Bernadette Ashley MD

## 2023-04-04 NOTE — PATIENT INSTRUCTIONS
as diabetes, high blood pressure, and high cholesterol. If you think you may have a problem with alcohol or drug use, talk to your doctor. Medicines    Take your medicines exactly as prescribed. Call your doctor if you think you are having a problem with your medicine.     If your doctor recommends aspirin, take the amount directed each day. Make sure you take aspirin and not another kind of pain reliever, such as acetaminophen (Tylenol). When should you call for help? Call 911 if you have symptoms of a heart attack. These may include:    Chest pain or pressure, or a strange feeling in the chest.     Sweating.     Shortness of breath.     Pain, pressure, or a strange feeling in the back, neck, jaw, or upper belly or in one or both shoulders or arms.     Lightheadedness or sudden weakness.     A fast or irregular heartbeat. After you call 911, the  may tell you to chew 1 adult-strength or 2 to 4 low-dose aspirin. Wait for an ambulance. Do not try to drive yourself. Watch closely for changes in your health, and be sure to contact your doctor if you have any problems. Where can you learn more? Go to http://www.stinson.com/ and enter F075 to learn more about \"A Healthy Heart: Care Instructions. \"  Current as of: September 7, 2022               Content Version: 13.6  © 0905-4748 [employer]. Care instructions adapted under license by [facility] ([facility]). If you have questions about a medical condition or this instruction, always ask your healthcare professional. [place] [address] any warranty or liability for your use of this information. Personalized Preventive Plan for Presley Laurent - 4/4/2023  Medicare offers a range of preventive health benefits. Some of the tests and screenings are paid in full while other may be subject to a deductible, co-insurance, and/or copay.     Some of these benefits include a comprehensive review of your medical history including

## 2023-04-14 ENCOUNTER — TELEPHONE (OUTPATIENT)
Dept: NEUROLOGY | Age: 83
End: 2023-04-14

## 2023-04-14 DIAGNOSIS — G25.3 MYOCLONUS: Primary | ICD-10-CM

## 2023-04-18 ENCOUNTER — NURSE ONLY (OUTPATIENT)
Dept: INTERNAL MEDICINE CLINIC | Age: 83
End: 2023-04-18

## 2023-04-18 DIAGNOSIS — Z79.01 LONG TERM CURRENT USE OF ANTICOAGULANT THERAPY: Primary | ICD-10-CM

## 2023-04-18 DIAGNOSIS — I48.91 ATRIAL FIBRILLATION, UNSPECIFIED TYPE (HCC): ICD-10-CM

## 2023-04-18 LAB
INTERNATIONAL NORMALIZATION RATIO, POC: 2.7
PROTHROMBIN TIME, POC: 32.7

## 2023-04-18 NOTE — PROGRESS NOTES
Pt reports taking 2 mg Warfarin QD, except 1 mg on Sundays. INR 2.7 today    4/18/23 Per PCP: Continue current dose, recheck in 6 weeks.

## 2023-04-24 ENCOUNTER — TELEPHONE (OUTPATIENT)
Dept: NEUROLOGY | Age: 83
End: 2023-04-24

## 2023-04-24 DIAGNOSIS — E03.4 HYPOTHYROIDISM DUE TO ACQUIRED ATROPHY OF THYROID: ICD-10-CM

## 2023-04-25 RX ORDER — LEVOTHYROXINE SODIUM 0.05 MG/1
TABLET ORAL
Qty: 90 TABLET | Refills: 1 | Status: SHIPPED | OUTPATIENT
Start: 2023-04-25

## 2023-05-30 ENCOUNTER — NURSE ONLY (OUTPATIENT)
Dept: INTERNAL MEDICINE CLINIC | Age: 83
End: 2023-05-30
Payer: MEDICARE

## 2023-05-30 DIAGNOSIS — Z79.01 LONG TERM CURRENT USE OF ANTICOAGULANT THERAPY: Primary | ICD-10-CM

## 2023-05-30 LAB
INTERNATIONAL NORMALIZATION RATIO, POC: 2.4
PROTHROMBIN TIME, POC: 29

## 2023-05-30 PROCEDURE — 85610 PROTHROMBIN TIME: CPT | Performed by: FAMILY MEDICINE

## 2023-05-30 NOTE — PROGRESS NOTES
5/30/23   Per Patient: he is taking 2mg daily, and 1mg on Sundays. Per PCP: continue same dose  Recheck in 4 weeks.

## 2023-06-05 RX ORDER — WARFARIN SODIUM 2 MG/1
TABLET ORAL
Qty: 90 TABLET | Refills: 1 | Status: SHIPPED | OUTPATIENT
Start: 2023-06-05

## 2023-06-28 ENCOUNTER — TELEPHONE (OUTPATIENT)
Dept: NEUROLOGY | Age: 83
End: 2023-06-28

## 2023-06-28 ENCOUNTER — OFFICE VISIT (OUTPATIENT)
Dept: NEUROLOGY | Age: 83
End: 2023-06-28
Payer: MEDICARE

## 2023-06-28 VITALS
HEART RATE: 75 BPM | SYSTOLIC BLOOD PRESSURE: 124 MMHG | OXYGEN SATURATION: 95 % | DIASTOLIC BLOOD PRESSURE: 64 MMHG | BODY MASS INDEX: 29.2 KG/M2 | HEIGHT: 70 IN | WEIGHT: 204 LBS

## 2023-06-28 DIAGNOSIS — I48.20 CHRONIC ATRIAL FIBRILLATION (HCC): ICD-10-CM

## 2023-06-28 DIAGNOSIS — G25.3 MYOCLONUS: Primary | ICD-10-CM

## 2023-06-28 DIAGNOSIS — Z95.0 CARDIAC PACEMAKER IN SITU: ICD-10-CM

## 2023-06-28 PROBLEM — G20 PARKINSON'S DISEASE (HCC): Status: ACTIVE | Noted: 2023-06-28

## 2023-06-28 PROBLEM — G20.A1 PARKINSON'S DISEASE: Status: ACTIVE | Noted: 2023-06-28

## 2023-06-28 PROCEDURE — G8417 CALC BMI ABV UP PARAM F/U: HCPCS | Performed by: NURSE PRACTITIONER

## 2023-06-28 PROCEDURE — 4004F PT TOBACCO SCREEN RCVD TLK: CPT | Performed by: NURSE PRACTITIONER

## 2023-06-28 PROCEDURE — 1123F ACP DISCUSS/DSCN MKR DOCD: CPT | Performed by: NURSE PRACTITIONER

## 2023-06-28 PROCEDURE — G8427 DOCREV CUR MEDS BY ELIG CLIN: HCPCS | Performed by: NURSE PRACTITIONER

## 2023-06-28 PROCEDURE — 99213 OFFICE O/P EST LOW 20 MIN: CPT | Performed by: NURSE PRACTITIONER

## 2023-06-30 ENCOUNTER — NURSE ONLY (OUTPATIENT)
Dept: INTERNAL MEDICINE CLINIC | Age: 83
End: 2023-06-30

## 2023-06-30 DIAGNOSIS — Z79.01 LONG TERM CURRENT USE OF ANTICOAGULANT THERAPY: Primary | ICD-10-CM

## 2023-06-30 DIAGNOSIS — G25.3 MYOCLONUS: ICD-10-CM

## 2023-06-30 LAB
INTERNATIONAL NORMALIZATION RATIO, POC: 2.6
PROTHROMBIN TIME, POC: 31.7

## 2023-07-13 RX ORDER — FAMOTIDINE 20 MG/1
TABLET, FILM COATED ORAL
Qty: 90 TABLET | Refills: 3 | Status: SHIPPED | OUTPATIENT
Start: 2023-07-13

## 2023-08-08 ENCOUNTER — OFFICE VISIT (OUTPATIENT)
Dept: INTERNAL MEDICINE CLINIC | Age: 83
End: 2023-08-08
Payer: MEDICARE

## 2023-08-08 VITALS
SYSTOLIC BLOOD PRESSURE: 116 MMHG | WEIGHT: 201 LBS | HEIGHT: 70 IN | BODY MASS INDEX: 28.77 KG/M2 | DIASTOLIC BLOOD PRESSURE: 68 MMHG | HEART RATE: 71 BPM | OXYGEN SATURATION: 92 %

## 2023-08-08 DIAGNOSIS — E03.4 HYPOTHYROIDISM DUE TO ACQUIRED ATROPHY OF THYROID: ICD-10-CM

## 2023-08-08 DIAGNOSIS — E78.2 MIXED HYPERLIPIDEMIA: ICD-10-CM

## 2023-08-08 DIAGNOSIS — Z12.5 SCREENING FOR PROSTATE CANCER: ICD-10-CM

## 2023-08-08 DIAGNOSIS — G25.2 RESTING TREMOR: Primary | ICD-10-CM

## 2023-08-08 DIAGNOSIS — Z79.01 LONG TERM CURRENT USE OF ANTICOAGULANT THERAPY: ICD-10-CM

## 2023-08-08 DIAGNOSIS — I15.9 HYPERTENSION, SECONDARY: ICD-10-CM

## 2023-08-08 DIAGNOSIS — E11.69 TYPE 2 DIABETES MELLITUS WITH OTHER SPECIFIED COMPLICATION, UNSPECIFIED WHETHER LONG TERM INSULIN USE (HCC): ICD-10-CM

## 2023-08-08 LAB
INTERNATIONAL NORMALIZATION RATIO, POC: 1.9
PROTHROMBIN TIME, POC: 22.2

## 2023-08-08 PROCEDURE — G8427 DOCREV CUR MEDS BY ELIG CLIN: HCPCS | Performed by: FAMILY MEDICINE

## 2023-08-08 PROCEDURE — 99214 OFFICE O/P EST MOD 30 MIN: CPT | Performed by: FAMILY MEDICINE

## 2023-08-08 PROCEDURE — 3051F HG A1C>EQUAL 7.0%<8.0%: CPT | Performed by: FAMILY MEDICINE

## 2023-08-08 PROCEDURE — G8417 CALC BMI ABV UP PARAM F/U: HCPCS | Performed by: FAMILY MEDICINE

## 2023-08-08 PROCEDURE — 1123F ACP DISCUSS/DSCN MKR DOCD: CPT | Performed by: FAMILY MEDICINE

## 2023-08-08 PROCEDURE — 4004F PT TOBACCO SCREEN RCVD TLK: CPT | Performed by: FAMILY MEDICINE

## 2023-08-08 PROCEDURE — 85610 PROTHROMBIN TIME: CPT | Performed by: FAMILY MEDICINE

## 2023-08-08 ASSESSMENT — ENCOUNTER SYMPTOMS
CHEST TIGHTNESS: 0
SHORTNESS OF BREATH: 0
ABDOMINAL PAIN: 0
VOMITING: 0
CONSTIPATION: 0
DIARRHEA: 0
COLOR CHANGE: 0
SORE THROAT: 0

## 2023-08-08 NOTE — PATIENT INSTRUCTIONS
We are committed to providing you the best care possible. If you receive a survey after visiting one of our offices, please take time to share your experience concerning your physician office visit. These surveys are confidential and no health information about you is shared. We are eager to improve for you and we are counting on your feedback to help make that happen. Start taking 2mg of coumadin every day. We will recheck in 2 weeks.

## 2023-08-23 ENCOUNTER — NURSE ONLY (OUTPATIENT)
Dept: INTERNAL MEDICINE CLINIC | Age: 83
End: 2023-08-23
Payer: MEDICARE

## 2023-08-23 DIAGNOSIS — Z79.01 LONG TERM CURRENT USE OF ANTICOAGULANT THERAPY: Primary | ICD-10-CM

## 2023-08-23 LAB
INTERNATIONAL NORMALIZATION RATIO, POC: 3.3
PROTHROMBIN TIME, POC: 23.8

## 2023-08-23 PROCEDURE — 85610 PROTHROMBIN TIME: CPT | Performed by: FAMILY MEDICINE

## 2023-08-23 PROCEDURE — 99999 PR OFFICE/OUTPT VISIT,PROCEDURE ONLY: CPT | Performed by: FAMILY MEDICINE

## 2023-08-23 NOTE — PROGRESS NOTES
Patient came in on this date for anticoagulation management. Patient states they are taking 2 mg everyday. Per PCP take 1 mg on Sun, 2 mg other days. Recheck INR in 2 weeks.

## 2023-08-29 DIAGNOSIS — G25.3 MYOCLONUS: ICD-10-CM

## 2023-08-29 NOTE — TELEPHONE ENCOUNTER
Pt wife states he is completely out of gabapentin and needs refill sent to [facility]. Rx pending. Requested Prescriptions     Pending Prescriptions Disp Refills    gabapentin (NEURONTIN) 400 MG capsule 120 capsule 3     Sig: Take 1 capsule by mouth 4 times daily.

## 2023-08-30 RX ORDER — GABAPENTIN 400 MG/1
CAPSULE ORAL
Qty: 120 CAPSULE | Refills: 3 | OUTPATIENT
Start: 2023-08-30

## 2023-08-30 RX ORDER — GABAPENTIN 400 MG/1
400 CAPSULE ORAL 4 TIMES DAILY
Qty: 120 CAPSULE | Refills: 3 | Status: SHIPPED | OUTPATIENT
Start: 2023-08-30 | End: 2024-08-29

## 2023-09-01 DIAGNOSIS — G25.2 RESTING TREMOR: ICD-10-CM

## 2023-09-01 RX ORDER — TRIHEXYPHENIDYL HYDROCHLORIDE 2 MG/1
TABLET ORAL
Qty: 90 TABLET | Refills: 1 | Status: SHIPPED | OUTPATIENT
Start: 2023-09-01

## 2023-09-06 ENCOUNTER — NURSE ONLY (OUTPATIENT)
Dept: INTERNAL MEDICINE CLINIC | Age: 83
End: 2023-09-06
Payer: MEDICARE

## 2023-09-06 DIAGNOSIS — Z79.01 LONG TERM CURRENT USE OF ANTICOAGULANT THERAPY: Primary | ICD-10-CM

## 2023-09-06 LAB
INTERNATIONAL NORMALIZATION RATIO, POC: 1.1
PROTHROMBIN TIME, POC: 13.4

## 2023-09-06 PROCEDURE — 85610 PROTHROMBIN TIME: CPT | Performed by: FAMILY MEDICINE

## 2023-09-06 PROCEDURE — 99999 PR OFFICE/OUTPT VISIT,PROCEDURE ONLY: CPT | Performed by: FAMILY MEDICINE

## 2023-09-06 NOTE — PROGRESS NOTES
Patient came in on this date for anticoagulation management. Patient states they are taking 1 mg on Sunday, 2 mg other days. Per PCP take 2 mg everyday. Recheck INR in 2 weeks.

## 2023-09-20 ENCOUNTER — NURSE ONLY (OUTPATIENT)
Dept: INTERNAL MEDICINE CLINIC | Age: 83
End: 2023-09-20
Payer: MEDICARE

## 2023-09-20 DIAGNOSIS — Z79.01 LONG TERM CURRENT USE OF ANTICOAGULANT THERAPY: Primary | ICD-10-CM

## 2023-09-20 LAB
INTERNATIONAL NORMALIZATION RATIO, POC: 2
PROTHROMBIN TIME, POC: 23.5

## 2023-09-20 PROCEDURE — 85610 PROTHROMBIN TIME: CPT | Performed by: FAMILY MEDICINE

## 2023-09-20 PROCEDURE — 99999 PR OFFICE/OUTPT VISIT,PROCEDURE ONLY: CPT | Performed by: FAMILY MEDICINE

## 2023-09-20 NOTE — PROGRESS NOTES
Patient came in on this date for anticoagulation management. Patient states they are taking 1 mf on Sunday, 2 mg other days. Per PCP take same dose. Recheck INR in 6 weeks.

## 2023-09-25 DIAGNOSIS — E03.4 HYPOTHYROIDISM DUE TO ACQUIRED ATROPHY OF THYROID: ICD-10-CM

## 2023-09-26 RX ORDER — LEVOTHYROXINE SODIUM 0.05 MG/1
TABLET ORAL
Qty: 90 TABLET | Refills: 1 | Status: SHIPPED | OUTPATIENT
Start: 2023-09-26

## 2023-10-13 NOTE — TELEPHONE ENCOUNTER
Last ov 6/28/23, next ov 12/28/23. Rx pending.      Requested Prescriptions     Pending Prescriptions Disp Refills    zonisamide (ZONEGRAN) 100 MG capsule [Pharmacy Med Name: ZONISAMIDE 100 MG CAPSULE] 180 capsule 1     Sig: TAKE 2 CAPSULES BY MOUTH AT BEDTIME

## 2023-10-17 RX ORDER — ZONISAMIDE 100 MG/1
CAPSULE ORAL
Qty: 180 CAPSULE | Refills: 1 | Status: SHIPPED | OUTPATIENT
Start: 2023-10-17

## 2023-11-01 ENCOUNTER — NURSE ONLY (OUTPATIENT)
Dept: INTERNAL MEDICINE CLINIC | Age: 83
End: 2023-11-01
Payer: MEDICARE

## 2023-11-01 DIAGNOSIS — Z79.01 LONG TERM CURRENT USE OF ANTICOAGULANT THERAPY: Primary | ICD-10-CM

## 2023-11-01 DIAGNOSIS — E03.4 HYPOTHYROIDISM DUE TO ACQUIRED ATROPHY OF THYROID: ICD-10-CM

## 2023-11-01 DIAGNOSIS — I15.9 HYPERTENSION, SECONDARY: ICD-10-CM

## 2023-11-01 DIAGNOSIS — E11.69 TYPE 2 DIABETES MELLITUS WITH OTHER SPECIFIED COMPLICATION, UNSPECIFIED WHETHER LONG TERM INSULIN USE (HCC): ICD-10-CM

## 2023-11-01 DIAGNOSIS — E78.2 MIXED HYPERLIPIDEMIA: ICD-10-CM

## 2023-11-01 DIAGNOSIS — Z12.5 SCREENING FOR PROSTATE CANCER: ICD-10-CM

## 2023-11-01 LAB
ALBUMIN SERPL-MCNC: 4.5 G/DL (ref 3.4–5)
ALBUMIN/GLOB SERPL: 1.6 {RATIO} (ref 1.1–2.2)
ALP SERPL-CCNC: 169 U/L (ref 40–129)
ALT SERPL-CCNC: 18 U/L (ref 10–40)
ANION GAP SERPL CALCULATED.3IONS-SCNC: 10 MMOL/L (ref 3–16)
AST SERPL-CCNC: 21 U/L (ref 15–37)
BASOPHILS # BLD: 0 K/UL (ref 0–0.2)
BASOPHILS NFR BLD: 0.6 %
BILIRUB SERPL-MCNC: 0.5 MG/DL (ref 0–1)
BUN SERPL-MCNC: 27 MG/DL (ref 7–20)
CALCIUM SERPL-MCNC: 9.6 MG/DL (ref 8.3–10.6)
CHLORIDE SERPL-SCNC: 109 MMOL/L (ref 99–110)
CHOLEST SERPL-MCNC: 184 MG/DL (ref 0–199)
CO2 SERPL-SCNC: 24 MMOL/L (ref 21–32)
CREAT SERPL-MCNC: 1.5 MG/DL (ref 0.8–1.3)
DEPRECATED RDW RBC AUTO: 14.6 % (ref 12.4–15.4)
EOSINOPHIL # BLD: 0.2 K/UL (ref 0–0.6)
EOSINOPHIL NFR BLD: 3.1 %
GFR SERPLBLD CREATININE-BSD FMLA CKD-EPI: 45 ML/MIN/{1.73_M2}
GLUCOSE SERPL-MCNC: 120 MG/DL (ref 70–99)
HCT VFR BLD AUTO: 47.1 % (ref 40.5–52.5)
HDLC SERPL-MCNC: 65 MG/DL (ref 40–60)
HGB BLD-MCNC: 16 G/DL (ref 13.5–17.5)
INTERNATIONAL NORMALIZATION RATIO, POC: 1.8
LDLC SERPL CALC-MCNC: 99 MG/DL
LYMPHOCYTES # BLD: 1.5 K/UL (ref 1–5.1)
LYMPHOCYTES NFR BLD: 19.2 %
MCH RBC QN AUTO: 33 PG (ref 26–34)
MCHC RBC AUTO-ENTMCNC: 34 G/DL (ref 31–36)
MCV RBC AUTO: 97.1 FL (ref 80–100)
MONOCYTES # BLD: 0.8 K/UL (ref 0–1.3)
MONOCYTES NFR BLD: 10.6 %
NEUTROPHILS # BLD: 5.1 K/UL (ref 1.7–7.7)
NEUTROPHILS NFR BLD: 66.5 %
PLATELET # BLD AUTO: 151 K/UL (ref 135–450)
PLATELET BLD QL SMEAR: ADEQUATE
PMV BLD AUTO: 10.5 FL (ref 5–10.5)
POTASSIUM SERPL-SCNC: 5 MMOL/L (ref 3.5–5.1)
PROT SERPL-MCNC: 7.3 G/DL (ref 6.4–8.2)
PROTHROMBIN TIME, POC: 22.2
PSA SERPL DL<=0.01 NG/ML-MCNC: 3.98 NG/ML (ref 0–4)
RBC # BLD AUTO: 4.85 M/UL (ref 4.2–5.9)
SLIDE REVIEW: NORMAL
SODIUM SERPL-SCNC: 143 MMOL/L (ref 136–145)
TRIGL SERPL-MCNC: 99 MG/DL (ref 0–150)
TSH SERPL DL<=0.005 MIU/L-ACNC: 3.04 UIU/ML (ref 0.27–4.2)
VLDLC SERPL CALC-MCNC: 20 MG/DL
WBC # BLD AUTO: 7.7 K/UL (ref 4–11)

## 2023-11-01 PROCEDURE — 85610 PROTHROMBIN TIME: CPT | Performed by: FAMILY MEDICINE

## 2023-11-01 PROCEDURE — 99999 PR OFFICE/OUTPT VISIT,PROCEDURE ONLY: CPT | Performed by: FAMILY MEDICINE

## 2023-11-01 PROCEDURE — 36415 COLL VENOUS BLD VENIPUNCTURE: CPT | Performed by: FAMILY MEDICINE

## 2023-11-01 NOTE — PROGRESS NOTES
Patient came into the office on this date for a lab draw only. Successful stick in L arm .  Orders have been changed/added to reflect today's nurse visit. '

## 2023-11-02 LAB
EST. AVERAGE GLUCOSE BLD GHB EST-MCNC: 151.3 MG/DL
HBA1C MFR BLD: 6.9 %

## 2023-11-14 ENCOUNTER — OFFICE VISIT (OUTPATIENT)
Dept: INTERNAL MEDICINE CLINIC | Age: 83
End: 2023-11-14
Payer: MEDICARE

## 2023-11-14 VITALS
HEART RATE: 83 BPM | WEIGHT: 205 LBS | OXYGEN SATURATION: 93 % | SYSTOLIC BLOOD PRESSURE: 98 MMHG | DIASTOLIC BLOOD PRESSURE: 68 MMHG | HEIGHT: 70 IN | BODY MASS INDEX: 29.35 KG/M2

## 2023-11-14 DIAGNOSIS — I15.9 HYPERTENSION, SECONDARY: ICD-10-CM

## 2023-11-14 DIAGNOSIS — G25.2 RESTING TREMOR: ICD-10-CM

## 2023-11-14 DIAGNOSIS — E03.4 HYPOTHYROIDISM DUE TO ACQUIRED ATROPHY OF THYROID: ICD-10-CM

## 2023-11-14 DIAGNOSIS — E78.2 MIXED HYPERLIPIDEMIA: Primary | ICD-10-CM

## 2023-11-14 DIAGNOSIS — Z79.01 LONG TERM CURRENT USE OF ANTICOAGULANT THERAPY: ICD-10-CM

## 2023-11-14 DIAGNOSIS — E11.69 TYPE 2 DIABETES MELLITUS WITH OTHER SPECIFIED COMPLICATION, UNSPECIFIED WHETHER LONG TERM INSULIN USE (HCC): ICD-10-CM

## 2023-11-14 LAB
INTERNATIONAL NORMALIZATION RATIO, POC: 3.6
PROTHROMBIN TIME, POC: 43.6

## 2023-11-14 PROCEDURE — 4004F PT TOBACCO SCREEN RCVD TLK: CPT | Performed by: FAMILY MEDICINE

## 2023-11-14 PROCEDURE — 85610 PROTHROMBIN TIME: CPT | Performed by: FAMILY MEDICINE

## 2023-11-14 PROCEDURE — G8427 DOCREV CUR MEDS BY ELIG CLIN: HCPCS | Performed by: FAMILY MEDICINE

## 2023-11-14 PROCEDURE — 3044F HG A1C LEVEL LT 7.0%: CPT | Performed by: FAMILY MEDICINE

## 2023-11-14 PROCEDURE — 99214 OFFICE O/P EST MOD 30 MIN: CPT | Performed by: FAMILY MEDICINE

## 2023-11-14 PROCEDURE — 1123F ACP DISCUSS/DSCN MKR DOCD: CPT | Performed by: FAMILY MEDICINE

## 2023-11-14 PROCEDURE — G8484 FLU IMMUNIZE NO ADMIN: HCPCS | Performed by: FAMILY MEDICINE

## 2023-11-14 PROCEDURE — G8417 CALC BMI ABV UP PARAM F/U: HCPCS | Performed by: FAMILY MEDICINE

## 2023-11-14 ASSESSMENT — ENCOUNTER SYMPTOMS
SORE THROAT: 0
CHEST TIGHTNESS: 0
DIARRHEA: 0
COLOR CHANGE: 0
VOMITING: 0
CONSTIPATION: 0
ABDOMINAL PAIN: 0
SHORTNESS OF BREATH: 0

## 2023-11-14 NOTE — PROGRESS NOTES
the Bilateral proximal Internal carotid artery. H/O Doppler ultrasound 06/15/2016    minimal plaque throughout bilaterally    H/O echocardiogram 5/3/16    EF 50-55%. Mild concentric LV hypertrophy with low normal systolic function. Mod bilateral atrial and right ventricle dilatation. Mild AR, MR, TR, [place]. Absence of pericardial effsion. H/O echocardiogram 8/4/11    EF(not given in faxed records) mild MR, mod LAE, mild AR    H/O echocardiogram 09/18/2019    EF 55-60%,  The left atrium is moderate to severely dilated. H/O percutaneous left heart catheterization 5/12/16    left circumflex is 60% very distal stenosis at the bifurcation, OM1 is 99% mid segment stenosis    H/O unstable angina     History of Doppler ultrasound 3/29/12    Abd Ao Duplex - no abdominal aoritic aneurysm or iliac aneurysm identified    History of Doppler ultrasound 10/19/10    carotid - 0-19% bilateral ICAs, R and L carotid system show minimal atheromatous diseassse without stenosis    History of Holter monitoring 10/19/10    rhythm is ventricular paced with underlying a-fib    History of nuclear stress test 02/06/2020    Normal study.     Hx of CT scan of chest 8/17/10    mod-severe centrilobular emphysematous changes    HX OTHER MEDICAL     Primary Care Physician Is Dr. Bertah Christina Boor    Hyperlipidemia     Hypertension     Hypothyroidism due to acquired atrophy of thyroid 2/29/2020    Kidney stones \"Been Having Kidney Stones For 39 Years\"    Passed Kidney Stones Several Times    Nephrolithiasis     with hematuria episodes followed by Dr Tino Cook    Nocturia     Pacemaker [id number]    S/P AV ablation    Peptic ulcer, unspecified site, unspecified as acute or chronic, without mention of hemorrhage or perforation 1983    per EGD    Pneumonia Dx 2000's    Shortness of breath     Skin Cancer Arms And Ears In Past    Excision Skin Cancer Arms And Ears    Sleep apnea     No CPAP- had sleep study done 10+ yrs ago    Stented coronary artery

## 2023-11-29 ENCOUNTER — NURSE ONLY (OUTPATIENT)
Dept: INTERNAL MEDICINE CLINIC | Age: 83
End: 2023-11-29
Payer: MEDICARE

## 2023-11-29 DIAGNOSIS — Z79.01 LONG TERM CURRENT USE OF ANTICOAGULANT THERAPY: Primary | ICD-10-CM

## 2023-11-29 LAB
INTERNATIONAL NORMALIZATION RATIO, POC: 3.2
PROTHROMBIN TIME, POC: 38.3

## 2023-11-29 PROCEDURE — 85610 PROTHROMBIN TIME: CPT | Performed by: FAMILY MEDICINE

## 2023-11-29 PROCEDURE — 99999 PR OFFICE/OUTPT VISIT,PROCEDURE ONLY: CPT | Performed by: FAMILY MEDICINE

## 2023-11-29 NOTE — PROGRESS NOTES
Patient came in on this date for anticoagulation management. Patient states they are taking 1 mg on Sunday's and 2 mg all other days. Per PCP take 1 mg on Sunday's and Wednesday's and 2 mg all other days. Recheck INR in 2 weeks.

## 2023-12-02 NOTE — PATIENT INSTRUCTIONS
[facility] ([place])  Obstetrics  Postpartum Progress Note    Patient Name: Mary Armstrong  MRN: [id number]  Admission Date: 2023  Hospital Length of Stay: 2 days  Attending Physician: Cassandra Galarza MD  Primary Care Provider: Scarlett Carlos MD    Subjective:     Principal Problem: (spontaneous vaginal delivery)    Hospital Course:  23 PPD#1 doing well, normal involution, breastfeeding, discharge tomorrow    Interval History:     She is doing well this morning. She is tolerating a regular diet without nausea or vomiting. She is voiding spontaneously. She is ambulating. She has passed flatus, and has not a BM. Vaginal bleeding is mild. She denies fever or chills. Abdominal pain is mild and controlled with oral medications. She Is breastfeeding. She desires circumcision for her male baby: not applicable.    Objective:     Vital Signs (Most Recent):  Temp: 98.3 °F (36.8 °C) (23 001)  Pulse: 64 (23)  Resp: 14 (23)  BP: (!) 93/52 (23)  SpO2: 95 % (23) Vital Signs (24h Range):  Temp:  [97.9 °F (36.6 °C)-99.6 °F (37.6 °C)] 98.3 °F (36.8 °C)  Pulse:  [55-93] 64  Resp:  [14-17] 14  SpO2:  [95 %-100 %] 95 %  BP: ()/(50-77) 93/52     Weight: 74.8 kg (164 lb 14.5 oz)  Body mass index is 25.83 kg/m².      Intake/Output Summary (Last 24 hours) at 2023 0816  Last data filed at 2023 1800  Gross per 24 hour   Intake 1133.86 ml   Output 2928 ml   Net -1794.14 ml         Significant Labs:  Lab Results   Component Value Date    GROUPTRH O NEG 2023    HEPBSAG Non-reactive 2023    STREPBCULT No Group B Streptococcus isolated 11/15/2023     Recent Labs   Lab 23  0446   HGB 11.9*   HCT 35.0*       I have personallly reviewed all pertinent lab results from the last 24 hours.  Recent Lab Results         23  0446   23  1805        Baso # 0.04         Basophil % 0.2         Differential Method Automated       We are committed to providing you the best care possible. If you receive a survey after visiting one of our offices, please take time to share your experience concerning your physician office visit. These surveys are confidential and no health information about you is shared. We are eager to improve for you and we are counting on your feedback to help make that happen.    Eos # 0.1         Eosinophil % 0.5         Gran # (ANC) 16.1         Gran % 81.4         Group & Rh   O NEG       Hematocrit 35.0         Hemoglobin 11.9         Immature Grans (Abs) 0.15  Comment: Mild elevation in immature granulocytes is non specific and   can be seen in a variety of conditions including stress response,   acute inflammation, trauma and pregnancy. Correlation with other   laboratory and clinical findings is essential.           Immature Granulocytes 0.8         INDIRECT KVNG   POS  Comment: PREV <RHIG       Lymph # 2.4         Lymph % 12.0         MCH 32.6         MCHC 34.0         MCV 96         Mono # 1.0         Mono % 5.1         MPV 11.6         nRBC 0         Platelet Count 160         RBC 3.65         RDW 13.1         Janet - FMH (Fetal Bleed Screen)   NEG       WBC 19.74                 Physical Exam:   Constitutional: She is oriented to person, place, and time. She appears well-developed and well-nourished.    HENT:   Head: Normocephalic and atraumatic.    Eyes: Pupils are equal, round, and reactive to light. EOM are normal.     Cardiovascular:  Normal rate.             Pulmonary/Chest: Effort normal.        Abdominal: Soft. There is no abdominal tenderness.             Musculoskeletal: Normal range of motion.       Neurological: She is alert and oriented to person, place, and time.    Skin: Skin is warm and dry.    Psychiatric: She has a normal mood and affect. Her behavior is normal. Judgment and thought content normal.       Review of Systems   Constitutional:  Negative for fever.   Eyes:  Negative for visual disturbance.   Cardiovascular:  Negative for chest pain.   Gastrointestinal:  Positive for abdominal pain.        Cramping     Genitourinary:  Positive for vaginal bleeding. Negative for vaginal pain.        Mild lochia rubra     Neurological:  Negative for headaches.   All other systems reviewed and are negative.    Assessment/Plan:     31 y.o. female  for:    *   (spontaneous vaginal delivery)  Routine postpartum care    Obstetrical laceration, first degree  Routine pericare    Rh negative state in antepartum period  Needs PP rhogam        Disposition: As patient meets milestones, will plan to discharge tomorrow on PPD#2.    Lily Oh CNM  Obstetrics  [facility] ([place])

## 2023-12-13 ENCOUNTER — NURSE ONLY (OUTPATIENT)
Dept: INTERNAL MEDICINE CLINIC | Age: 83
End: 2023-12-13
Payer: MEDICARE

## 2023-12-13 DIAGNOSIS — Z79.01 LONG TERM CURRENT USE OF ANTICOAGULANT THERAPY: Primary | ICD-10-CM

## 2023-12-13 LAB
INTERNATIONAL NORMALIZATION RATIO, POC: 2.9
PROTHROMBIN TIME, POC: 34.2

## 2023-12-13 PROCEDURE — 85610 PROTHROMBIN TIME: CPT | Performed by: FAMILY MEDICINE

## 2023-12-13 NOTE — PROGRESS NOTES
Patient came in on this date for anticoagulation management. Patient states they are taking 1 mg on sun, wed. 2 mg other days. Per PCP take same dose. Recheck INR in 6 weeks.

## 2023-12-28 ENCOUNTER — OFFICE VISIT (OUTPATIENT)
Dept: NEUROLOGY | Age: 83
End: 2023-12-28
Payer: MEDICARE

## 2023-12-28 VITALS
BODY MASS INDEX: 29.06 KG/M2 | HEART RATE: 70 BPM | SYSTOLIC BLOOD PRESSURE: 132 MMHG | HEIGHT: 70 IN | DIASTOLIC BLOOD PRESSURE: 70 MMHG | WEIGHT: 203 LBS | OXYGEN SATURATION: 98 %

## 2023-12-28 DIAGNOSIS — G25.3 MYOCLONUS: ICD-10-CM

## 2023-12-28 DIAGNOSIS — G40.89 OTHER SEIZURES (HCC): Primary | ICD-10-CM

## 2023-12-28 PROBLEM — R56.9 UNSPECIFIED CONVULSIONS (HCC): Status: ACTIVE | Noted: 2023-12-28

## 2023-12-28 PROCEDURE — G8417 CALC BMI ABV UP PARAM F/U: HCPCS | Performed by: NURSE PRACTITIONER

## 2023-12-28 PROCEDURE — G8484 FLU IMMUNIZE NO ADMIN: HCPCS | Performed by: NURSE PRACTITIONER

## 2023-12-28 PROCEDURE — G8427 DOCREV CUR MEDS BY ELIG CLIN: HCPCS | Performed by: NURSE PRACTITIONER

## 2023-12-28 PROCEDURE — 4004F PT TOBACCO SCREEN RCVD TLK: CPT | Performed by: NURSE PRACTITIONER

## 2023-12-28 PROCEDURE — 99213 OFFICE O/P EST LOW 20 MIN: CPT | Performed by: NURSE PRACTITIONER

## 2023-12-28 PROCEDURE — 1123F ACP DISCUSS/DSCN MKR DOCD: CPT | Performed by: NURSE PRACTITIONER

## 2023-12-28 RX ORDER — ZONISAMIDE 100 MG/1
100 CAPSULE ORAL NIGHTLY
Qty: 90 CAPSULE | Refills: 1 | Status: SHIPPED | OUTPATIENT
Start: 2023-12-28

## 2023-12-28 RX ORDER — GABAPENTIN 400 MG/1
400 CAPSULE ORAL 3 TIMES DAILY
Qty: 180 CAPSULE | Refills: 1 | Status: SHIPPED | OUTPATIENT
Start: 2023-12-28 | End: 2024-12-27

## 2023-12-28 NOTE — PROGRESS NOTES
12/28/23    Lubna Kirk  1940    Chief Complaint   Patient presents with    Follow-up     Patient presents for follow-up for myoclonus. History of Present Illness  Jewel Owen is a 78 y.o. male presenting today for follow-up of:Myoclonus. He takes gabapentin 800 Mg in the morning, 400 mg in the afternoon and 400 mg in the evening, zonisamide 200 mg bedtime, last visit he was started on Austedo 6 mg twice daily for his myoclonus. Last visit, he he told me adding Austedo 6 mg twice daily was the best control he has ever had over his myoclonus. It was causing him some fatigue, we decreased his gabapentin to 400 mg 3 times daily. Today he is doing well, still has myoclonus in his RLE but it has improved. He still has some fatigue, and he has dreams at night. They do not bother him, he dreams about \" household items\" his wife told me 1 morning when he woke up he asked where the basket of food was. He controls his myoclonus by propping up his leg. In the past, he has even tried Botox without any improvement. He is not interested in referral to a movement specialist.    Current Outpatient Medications   Medication Sig Dispense Refill    gabapentin (NEURONTIN) 400 MG capsule Take 1 capsule by mouth 3 times daily.  180 capsule 1    deutetrabenazine (AUSTEDO) 6 MG tablet Take 1 tablet by mouth 2 times daily 180 tablet 5    zonisamide (ZONEGRAN) 100 MG capsule Take 1 capsule by mouth at bedtime 90 capsule 1    Evolocumab (REPATHA) SOSY syringe Inject 1 mL into the skin every 14 days 2.1 mL 3    rOPINIRole (REQUIP) 0.25 MG tablet TAKE 1 TABLET BY MOUTH EVERY DAY 90 tablet 1    levothyroxine (SYNTHROID) 50 MCG tablet TAKE 1 TABLET BY MOUTH EVERY DAY 90 tablet 1    trihexyphenidyl (ARTANE) 2 MG tablet TAKE 1 TABLET BY MOUTH EVERY DAY 90 tablet 1    famotidine (PEPCID) 20 MG tablet TAKE 1/2 TABLET BY MOUTH TWICE A DAY 90 tablet 3    warfarin (COUMADIN) 2 MG tablet TAKE 1 TABLET BY MOUTH EVERY DAY 90 tablet 1

## 2024-01-15 RX ORDER — AMLODIPINE BESYLATE 10 MG/1
TABLET ORAL
Qty: 90 TABLET | Refills: 3 | Status: SHIPPED | OUTPATIENT
Start: 2024-01-15

## 2024-01-22 ENCOUNTER — TELEPHONE (OUTPATIENT)
Dept: INTERNAL MEDICINE CLINIC | Age: 84
End: 2024-01-22

## 2024-01-22 NOTE — TELEPHONE ENCOUNTER
Called and spoke w/patient's spouse-per HIPAA-she mitali have him call urology about ordering a MRI due to the abnormal CT results

## 2024-01-22 NOTE — TELEPHONE ENCOUNTER
Received CT abdomen results ordered by urology.  Shows a few masses in liver that are concerning for possible malignancy.  MRI is recommended for further evaluation.     Can you contact patient and see if urology has already ordered MRI/follow up imaging for patient? If not, I can order.  Thanks.

## 2024-01-23 DIAGNOSIS — R16.0 LIVER MASSES: Primary | ICD-10-CM

## 2024-01-23 NOTE — PROGRESS NOTES
Liver masses on CT ordered by urology.  Concerning for malignancy- recommending MRI but patient cannot complete due to pacemaker.  Discussed with patient- will refer to GI for further evaluation.

## 2024-01-24 ENCOUNTER — NURSE ONLY (OUTPATIENT)
Dept: INTERNAL MEDICINE CLINIC | Age: 84
End: 2024-01-24
Payer: MEDICARE

## 2024-01-24 DIAGNOSIS — Z79.01 LONG TERM CURRENT USE OF ANTICOAGULANT THERAPY: Primary | ICD-10-CM

## 2024-01-24 LAB
INTERNATIONAL NORMALIZATION RATIO, POC: 2
PROTHROMBIN TIME, POC: 23.6

## 2024-01-24 PROCEDURE — 85610 PROTHROMBIN TIME: CPT | Performed by: FAMILY MEDICINE

## 2024-01-24 NOTE — PROGRESS NOTES
Patient came in on this date for anticoagulation management. Patient states they are taking 1 mg on sun and wed. 2 mg other days. Per PCP take same dose. Recheck INR in 6 weeks.

## 2024-01-25 ENCOUNTER — TELEPHONE (OUTPATIENT)
Dept: GASTROENTEROLOGY | Age: 84
End: 2024-01-25

## 2024-01-25 NOTE — TELEPHONE ENCOUNTER
Called pt. In regards to a referral for Liver masses. Made appt for pt to see dr. Montaño on 2/28/24 @11:15am

## 2024-02-08 RX ORDER — WARFARIN SODIUM 2 MG/1
TABLET ORAL
Qty: 90 TABLET | Refills: 1 | Status: SHIPPED | OUTPATIENT
Start: 2024-02-08

## 2024-02-26 DIAGNOSIS — G25.3 MYOCLONUS: ICD-10-CM

## 2024-02-26 NOTE — TELEPHONE ENCOUNTER
Patient needs a refill on   gabapentin (NEURONTIN) 400 MG capsule . They need a 90 day supply.         Pharmacy:       [id number] [place] [id number] [facility] [phone] - F [phone] [[id number]]

## 2024-02-27 DIAGNOSIS — G25.2 RESTING TREMOR: ICD-10-CM

## 2024-02-27 RX ORDER — TRIHEXYPHENIDYL HYDROCHLORIDE 2 MG/1
TABLET ORAL
Qty: 90 TABLET | Refills: 1 | Status: SHIPPED | OUTPATIENT
Start: 2024-02-27

## 2024-02-28 RX ORDER — GABAPENTIN 400 MG/1
400 CAPSULE ORAL 3 TIMES DAILY
Qty: 270 CAPSULE | Refills: 3 | Status: SHIPPED | OUTPATIENT
Start: 2024-02-28 | End: 2025-02-27

## 2024-03-02 DIAGNOSIS — E78.2 MIXED HYPERLIPIDEMIA: ICD-10-CM

## 2024-03-04 DIAGNOSIS — E03.4 HYPOTHYROIDISM DUE TO ACQUIRED ATROPHY OF THYROID: ICD-10-CM

## 2024-03-04 RX ORDER — EVOLOCUMAB 140 MG/ML
INJECTION, SOLUTION SUBCUTANEOUS
Qty: 3 EACH | Refills: 2 | Status: SHIPPED | OUTPATIENT
Start: 2024-03-04

## 2024-03-05 RX ORDER — ROPINIROLE 0.25 MG/1
0.25 TABLET, FILM COATED ORAL DAILY
Qty: 90 TABLET | Refills: 1 | Status: SHIPPED | OUTPATIENT
Start: 2024-03-05

## 2024-03-05 RX ORDER — LEVOTHYROXINE SODIUM 0.05 MG/1
TABLET ORAL
Qty: 90 TABLET | Refills: 1 | Status: SHIPPED | OUTPATIENT
Start: 2024-03-05

## 2024-03-07 DIAGNOSIS — E03.4 HYPOTHYROIDISM DUE TO ACQUIRED ATROPHY OF THYROID: ICD-10-CM

## 2024-03-07 DIAGNOSIS — E11.69 TYPE 2 DIABETES MELLITUS WITH OTHER SPECIFIED COMPLICATION, UNSPECIFIED WHETHER LONG TERM INSULIN USE (HCC): ICD-10-CM

## 2024-03-07 DIAGNOSIS — E78.2 MIXED HYPERLIPIDEMIA: Primary | ICD-10-CM

## 2024-03-07 DIAGNOSIS — N18.31 STAGE 3A CHRONIC KIDNEY DISEASE (HCC): ICD-10-CM

## 2024-03-07 PROCEDURE — 36415 COLL VENOUS BLD VENIPUNCTURE: CPT | Performed by: FAMILY MEDICINE

## 2024-03-08 ENCOUNTER — NURSE ONLY (OUTPATIENT)
Dept: INTERNAL MEDICINE CLINIC | Age: 84
End: 2024-03-08
Payer: MEDICARE

## 2024-03-08 DIAGNOSIS — I48.91 ATRIAL FIBRILLATION, UNSPECIFIED TYPE (HCC): Primary | ICD-10-CM

## 2024-03-08 LAB
ALBUMIN SERPL-MCNC: 4.5 G/DL (ref 3.4–5)
ALBUMIN/GLOB SERPL: 1.7 {RATIO} (ref 1.1–2.2)
ALP SERPL-CCNC: 164 U/L (ref 40–129)
ALT SERPL-CCNC: 16 U/L (ref 10–40)
ANION GAP SERPL CALCULATED.3IONS-SCNC: 12 MMOL/L (ref 3–16)
AST SERPL-CCNC: 21 U/L (ref 15–37)
BASOPHILS # BLD: 0.1 K/UL (ref 0–0.2)
BASOPHILS NFR BLD: 1.2 %
BILIRUB SERPL-MCNC: 0.7 MG/DL (ref 0–1)
BUN SERPL-MCNC: 25 MG/DL (ref 7–20)
CALCIUM SERPL-MCNC: 9.4 MG/DL (ref 8.3–10.6)
CHLORIDE SERPL-SCNC: 106 MMOL/L (ref 99–110)
CHOLEST SERPL-MCNC: 139 MG/DL (ref 0–199)
CO2 SERPL-SCNC: 25 MMOL/L (ref 21–32)
CREAT SERPL-MCNC: 1.7 MG/DL (ref 0.8–1.3)
DEPRECATED RDW RBC AUTO: 14.2 % (ref 12.4–15.4)
EOSINOPHIL # BLD: 0.2 K/UL (ref 0–0.6)
EOSINOPHIL NFR BLD: 2.3 %
GFR SERPLBLD CREATININE-BSD FMLA CKD-EPI: 39 ML/MIN/{1.73_M2}
GLUCOSE SERPL-MCNC: 127 MG/DL (ref 70–99)
HCT VFR BLD AUTO: 46.6 % (ref 40.5–52.5)
HDLC SERPL-MCNC: 66 MG/DL (ref 40–60)
HGB BLD-MCNC: 15.9 G/DL (ref 13.5–17.5)
INTERNATIONAL NORMALIZATION RATIO, POC: 0
INTERNATIONAL NORMALIZATION RATIO, POC: 1.2
LDLC SERPL CALC-MCNC: 53 MG/DL
LYMPHOCYTES # BLD: 1.7 K/UL (ref 1–5.1)
LYMPHOCYTES NFR BLD: 19.9 %
MCH RBC QN AUTO: 32.6 PG (ref 26–34)
MCHC RBC AUTO-ENTMCNC: 34 G/DL (ref 31–36)
MCV RBC AUTO: 95.9 FL (ref 80–100)
MONOCYTES # BLD: 1 K/UL (ref 0–1.3)
MONOCYTES NFR BLD: 11.2 %
NEUTROPHILS # BLD: 5.7 K/UL (ref 1.7–7.7)
NEUTROPHILS NFR BLD: 65.4 %
PLATELET # BLD AUTO: 180 K/UL (ref 135–450)
PMV BLD AUTO: 10 FL (ref 5–10.5)
POTASSIUM SERPL-SCNC: 4.8 MMOL/L (ref 3.5–5.1)
PROT SERPL-MCNC: 7.2 G/DL (ref 6.4–8.2)
PROTHROMBIN TIME, POC: 1.2
RBC # BLD AUTO: 4.86 M/UL (ref 4.2–5.9)
SODIUM SERPL-SCNC: 143 MMOL/L (ref 136–145)
TRIGL SERPL-MCNC: 98 MG/DL (ref 0–150)
TSH SERPL DL<=0.005 MIU/L-ACNC: 3.75 UIU/ML (ref 0.27–4.2)
VLDLC SERPL CALC-MCNC: 20 MG/DL
WBC # BLD AUTO: 8.7 K/UL (ref 4–11)

## 2024-03-08 PROCEDURE — 85610 PROTHROMBIN TIME: CPT | Performed by: FAMILY MEDICINE

## 2024-03-09 LAB
EST. AVERAGE GLUCOSE BLD GHB EST-MCNC: 154.2 MG/DL
HBA1C MFR BLD: 7 %

## 2024-03-14 ENCOUNTER — OFFICE VISIT (OUTPATIENT)
Dept: INTERNAL MEDICINE CLINIC | Age: 84
End: 2024-03-14
Payer: MEDICARE

## 2024-03-14 VITALS
HEIGHT: 70 IN | OXYGEN SATURATION: 95 % | WEIGHT: 204 LBS | DIASTOLIC BLOOD PRESSURE: 68 MMHG | BODY MASS INDEX: 29.2 KG/M2 | HEART RATE: 68 BPM | SYSTOLIC BLOOD PRESSURE: 112 MMHG

## 2024-03-14 DIAGNOSIS — J44.9 MODERATE COPD (CHRONIC OBSTRUCTIVE PULMONARY DISEASE) (HCC): ICD-10-CM

## 2024-03-14 DIAGNOSIS — E03.4 HYPOTHYROIDISM DUE TO ACQUIRED ATROPHY OF THYROID: ICD-10-CM

## 2024-03-14 DIAGNOSIS — N18.31 STAGE 3A CHRONIC KIDNEY DISEASE (HCC): ICD-10-CM

## 2024-03-14 DIAGNOSIS — G25.2 RESTING TREMOR: ICD-10-CM

## 2024-03-14 DIAGNOSIS — E78.2 MIXED HYPERLIPIDEMIA: ICD-10-CM

## 2024-03-14 DIAGNOSIS — I48.20 CHRONIC ATRIAL FIBRILLATION (HCC): ICD-10-CM

## 2024-03-14 DIAGNOSIS — E11.69 TYPE 2 DIABETES MELLITUS WITH OTHER SPECIFIED COMPLICATION, UNSPECIFIED WHETHER LONG TERM INSULIN USE (HCC): Primary | ICD-10-CM

## 2024-03-14 DIAGNOSIS — I15.9 HYPERTENSION, SECONDARY: ICD-10-CM

## 2024-03-14 DIAGNOSIS — G40.89 OTHER SEIZURES (HCC): ICD-10-CM

## 2024-03-14 DIAGNOSIS — Z79.01 LONG TERM CURRENT USE OF ANTICOAGULANT THERAPY: ICD-10-CM

## 2024-03-14 LAB
INTERNATIONAL NORMALIZATION RATIO, POC: 1.7
PROTHROMBIN TIME, POC: 20.4

## 2024-03-14 PROCEDURE — 3051F HG A1C>EQUAL 7.0%<8.0%: CPT | Performed by: FAMILY MEDICINE

## 2024-03-14 PROCEDURE — 85610 PROTHROMBIN TIME: CPT | Performed by: FAMILY MEDICINE

## 2024-03-14 PROCEDURE — G8417 CALC BMI ABV UP PARAM F/U: HCPCS | Performed by: FAMILY MEDICINE

## 2024-03-14 PROCEDURE — 3023F SPIROM DOC REV: CPT | Performed by: FAMILY MEDICINE

## 2024-03-14 PROCEDURE — G2211 COMPLEX E/M VISIT ADD ON: HCPCS | Performed by: FAMILY MEDICINE

## 2024-03-14 PROCEDURE — 99214 OFFICE O/P EST MOD 30 MIN: CPT | Performed by: FAMILY MEDICINE

## 2024-03-14 PROCEDURE — 4004F PT TOBACCO SCREEN RCVD TLK: CPT | Performed by: FAMILY MEDICINE

## 2024-03-14 PROCEDURE — G8427 DOCREV CUR MEDS BY ELIG CLIN: HCPCS | Performed by: FAMILY MEDICINE

## 2024-03-14 PROCEDURE — 1123F ACP DISCUSS/DSCN MKR DOCD: CPT | Performed by: FAMILY MEDICINE

## 2024-03-14 PROCEDURE — G8484 FLU IMMUNIZE NO ADMIN: HCPCS | Performed by: FAMILY MEDICINE

## 2024-03-14 ASSESSMENT — ENCOUNTER SYMPTOMS
ABDOMINAL PAIN: 0
VOMITING: 0
DIARRHEA: 0
SORE THROAT: 0
COLOR CHANGE: 0
SHORTNESS OF BREATH: 0
CONSTIPATION: 0
CHEST TIGHTNESS: 0

## 2024-03-14 ASSESSMENT — PATIENT HEALTH QUESTIONNAIRE - PHQ9
SUM OF ALL RESPONSES TO PHQ QUESTIONS 1-9: 0
1. LITTLE INTEREST OR PLEASURE IN DOING THINGS: 0
2. FEELING DOWN, DEPRESSED OR HOPELESS: 0
SUM OF ALL RESPONSES TO PHQ QUESTIONS 1-9: 0
SUM OF ALL RESPONSES TO PHQ9 QUESTIONS 1 & 2: 0

## 2024-03-14 NOTE — PROGRESS NOTES
Subjective:      Chief Complaint   Patient presents with    Follow-up       HPI:  Devyn Kirk is a 83 y.o. male who presents today for follow up of chronic conditions as listed below.    INR 1.7 today.  Taking coumadin 1mg on Sun and Wed, 2mg every day.  Had been holding coumadin for a skin biopsy, just restarted coumadin 6 days ago.       Tremor:  Still doing well on austedo.  States he has some fatigue with medication, but tremor has been very well controlled with treatment.       HLP:  Cannot tolerate statins or praluent.  Was tried on repatha at last appointment.  Has been tolerating without issues- no myalgias.    DM:  States he does not eat much sweets.     BP's normal.      Otherwise no changes in medications.      Feeling well today, no acute concerns.  Labs reviewed.           Past Medical History:   Diagnosis Date    Acid reflux     Adenomatous polyp of colon 1-2009    last colonoscopy recomm 3 yr follow up    Anticoagulated 2010    takes for afib Dr Ko monitors INR 1.2 on 02/08/2018    Arthritis     \"arthritis in low back\"    CAD (coronary artery disease)     follow with dr Walker- cardiac clearance for surgery done 10/31/2017 on chart)    Chronic atrial fibrillation (HCC)     Sees Dr. Robertson    COPD (chronic obstructive pulmonary disease) ([facility])     follows with dr Greer    Diverticulitis     Diverticulosis of colon     left colon    Enlarged prostate     \"had to ream me out a couple of times\":    H/O cardiac catheterization 1/31/2006    R codominant, RCA patent, LAD patent with minimal nonobstructive irreg, CIRC patent and codominant, RI small but patent    H/O cardiovascular stress test 5/18/10    EF81% decrease in perfusion in inferoseptal segments c/w paced rhythm, LV small in volume    H/O cardiovascular stress test 5/5/2016    lexiscan-normal,EF70%    H/O cardiovascular stress test 6/2/2016    treadmill    H/O Doppler Carotid ultrasound 11/01/2019    Mild (0-49%) disease of the Bilateral

## 2024-03-28 ENCOUNTER — NURSE ONLY (OUTPATIENT)
Dept: INTERNAL MEDICINE CLINIC | Age: 84
End: 2024-03-28
Payer: MEDICARE

## 2024-03-28 DIAGNOSIS — Z79.01 LONG TERM CURRENT USE OF ANTICOAGULANT THERAPY: Primary | ICD-10-CM

## 2024-03-28 LAB
INTERNATIONAL NORMALIZATION RATIO, POC: 2.6
PROTHROMBIN TIME, POC: 0

## 2024-03-28 PROCEDURE — 85610 PROTHROMBIN TIME: CPT | Performed by: FAMILY MEDICINE

## 2024-04-15 ENCOUNTER — TELEPHONE (OUTPATIENT)
Dept: INTERNAL MEDICINE CLINIC | Age: 84
End: 2024-04-15

## 2024-04-15 NOTE — TELEPHONE ENCOUNTER
Patients wife called in stating patient is complaining of fatigue, chest tightness and shortness of breath with a headache.  Advised patient to go to the ER.  Patients wife expressed understanding.

## 2024-04-26 ENCOUNTER — NURSE ONLY (OUTPATIENT)
Dept: INTERNAL MEDICINE CLINIC | Age: 84
End: 2024-04-26
Payer: MEDICARE

## 2024-04-26 DIAGNOSIS — Z79.01 LONG TERM CURRENT USE OF ANTICOAGULANT THERAPY: Primary | ICD-10-CM

## 2024-04-26 LAB
INTERNATIONAL NORMALIZATION RATIO, POC: 2.6
PROTHROMBIN TIME, POC: 30.7

## 2024-04-26 PROCEDURE — 85610 PROTHROMBIN TIME: CPT | Performed by: FAMILY MEDICINE

## 2024-04-26 NOTE — PROGRESS NOTES
Patient came in on this date for anticoagulation management. Patient states they are taking 1 mg on Sunday. 2 mg other days. Per PCP take same dose. Recheck INR in 6 weeks.

## 2024-05-13 DIAGNOSIS — K74.69 OTHER CIRRHOSIS OF LIVER (HCC): ICD-10-CM

## 2024-05-23 ENCOUNTER — HOSPITAL ENCOUNTER (OUTPATIENT)
Age: 84
Discharge: HOME | End: 2024-05-23
Payer: MEDICARE

## 2024-05-23 ENCOUNTER — HOSPITAL ENCOUNTER (OUTPATIENT)
Dept: GENERAL RADIOLOGY | Age: 84
Discharge: HOME | End: 2024-05-23
Payer: MEDICARE

## 2024-05-23 DIAGNOSIS — R06.02 SHORTNESS OF BREATH: ICD-10-CM

## 2024-05-23 LAB
ALBUMIN SERPL-MCNC: 4.5 GM/DL (ref 3.4–5)
ALP BLD-CCNC: 180 IU/L (ref 40–128)
ALT SERPL-CCNC: 18 U/L (ref 10–40)
ANION GAP SERPL CALCULATED.3IONS-SCNC: 11 MMOL/L (ref 7–16)
AST SERPL-CCNC: 21 IU/L (ref 15–37)
BASOPHILS ABSOLUTE: 0.1 K/CU MM
BASOPHILS RELATIVE PERCENT: 0.6 % (ref 0–1)
BILIRUB SERPL-MCNC: 0.5 MG/DL (ref 0–1)
BUN SERPL-MCNC: 27 MG/DL (ref 6–23)
CALCIUM SERPL-MCNC: 9.2 MG/DL (ref 8.3–10.6)
CHLORIDE BLD-SCNC: 108 MMOL/L (ref 99–110)
CO2: 22 MMOL/L (ref 21–32)
CREAT SERPL-MCNC: 1.5 MG/DL (ref 0.9–1.3)
DIFFERENTIAL TYPE: ABNORMAL
EOSINOPHILS ABSOLUTE: 0.4 K/CU MM
EOSINOPHILS RELATIVE PERCENT: 3.6 % (ref 0–3)
GFR, ESTIMATED: 45 ML/MIN/1.73M2
GLUCOSE SERPL-MCNC: 164 MG/DL (ref 70–99)
HCT VFR BLD CALC: 49.7 % (ref 42–52)
HEMOGLOBIN: 16.1 GM/DL (ref 13.5–18)
IMMATURE NEUTROPHIL %: 0.5 % (ref 0–0.43)
LYMPHOCYTES ABSOLUTE: 1.7 K/CU MM
LYMPHOCYTES RELATIVE PERCENT: 17.7 % (ref 24–44)
MCH RBC QN AUTO: 31.7 PG (ref 27–31)
MCHC RBC AUTO-ENTMCNC: 32.4 % (ref 32–36)
MCV RBC AUTO: 97.8 FL (ref 78–100)
MONOCYTES ABSOLUTE: 1.1 K/CU MM
MONOCYTES RELATIVE PERCENT: 11.6 % (ref 0–4)
NEUTROPHILS ABSOLUTE: 6.4 K/CU MM
NEUTROPHILS RELATIVE PERCENT: 66 % (ref 36–66)
NUCLEATED RBC %: 0 %
PDW BLD-RTO: 15.3 % (ref 11.7–14.9)
PLATELET # BLD: 177 K/CU MM (ref 140–440)
PMV BLD AUTO: 11.2 FL (ref 7.5–11.1)
POTASSIUM SERPL-SCNC: 4.8 MMOL/L (ref 3.5–5.1)
PRO-BNP: 543.4 PG/ML
RBC # BLD: 5.08 M/CU MM (ref 4.6–6.2)
SODIUM BLD-SCNC: 141 MMOL/L (ref 135–145)
TOTAL IMMATURE NEUTOROPHIL: 0.05 K/CU MM
TOTAL NUCLEATED RBC: 0 K/CU MM
TOTAL PROTEIN: 7.6 GM/DL (ref 6.4–8.2)
TSH SERPL DL<=0.005 MIU/L-ACNC: 2.62 UIU/ML (ref 0.27–4.2)
WBC # BLD: 9.7 K/CU MM (ref 4–10.5)

## 2024-05-23 PROCEDURE — 71046 X-RAY EXAM CHEST 2 VIEWS: CPT

## 2024-05-23 PROCEDURE — 36415 COLL VENOUS BLD VENIPUNCTURE: CPT

## 2024-05-23 PROCEDURE — 80053 COMPREHEN METABOLIC PANEL: CPT

## 2024-05-23 PROCEDURE — 83880 ASSAY OF NATRIURETIC PEPTIDE: CPT

## 2024-05-23 PROCEDURE — 84443 ASSAY THYROID STIM HORMONE: CPT

## 2024-05-23 PROCEDURE — 85025 COMPLETE CBC W/AUTO DIFF WBC: CPT

## 2024-05-31 ENCOUNTER — TELEPHONE (OUTPATIENT)
Dept: INTERNAL MEDICINE CLINIC | Age: 84
End: 2024-05-31

## 2024-05-31 DIAGNOSIS — J44.9 MODERATE COPD (CHRONIC OBSTRUCTIVE PULMONARY DISEASE) (HCC): ICD-10-CM

## 2024-05-31 RX ORDER — ALBUTEROL SULFATE 90 UG/1
2 AEROSOL, METERED RESPIRATORY (INHALATION) EVERY 6 HOURS PRN
Qty: 54 EACH | Refills: 1 | Status: SHIPPED | OUTPATIENT
Start: 2024-05-31

## 2024-05-31 NOTE — TELEPHONE ENCOUNTER
Got a call from the patients wife, needs refill for albuterol inhaler sent to [facility] on [facility].

## 2024-06-05 LAB
LEFT VENTRICULAR EJECTION FRACTION HIGH VALUE: NORMAL
LEFT VENTRICULAR EJECTION FRACTION MODE: NORMAL
LV EF: NORMAL %

## 2024-06-05 NOTE — PROGRESS NOTES
IR Procedure at [facility]:Left message for patient's daughter Amy to call me back for PAT, patient's phone is temporarily out f service.ADDENDUM: Was able to leave a message on patient's phone today, ask patient to call me back so I could go over some things with him, before his procedure.ADDENDUM: Patient called back and he will arrive at 0900 at [facility] on 6/12/2024 for his procedure at 1030. Also went over below instructions and told patient to take his medications as scheduled, except for coumadin last dose 6/6/2024.    NPO at Midnight    2.   Follow your directions as prescribed by the doctor for your procedure and medications.  3.   Consult your provider as to when to stop blood thinner  4.   Do not take any pain medication within 6 hours of your procedure  5.   Do not drink any alcoholic beverages or use any street drugs 24 hours before procedure.  6.   Please wear simple, loose fitting clothing to the hospital.  Do not bring valuables (money,             credit cards, checkbooks, etc.)     7.   If you  have a Living Will and Durable Power of  for Healthcare, please bring in a copy.  8.   Please bring picture ID,  insurance card, paperwork from the doctors office            (H & P, Consent,  & card for implantable devices).  9.   Report to the information desk on the ground floor.  10. Take a shower the night before or morning of your procedure, do not apply any lotion, oil or powder.  11. If you are going to be sedated for the procedure, you will need a responsible adult to drive you home.

## 2024-06-12 ENCOUNTER — HOSPITAL ENCOUNTER (OUTPATIENT)
Dept: INTERVENTIONAL RADIOLOGY/VASCULAR | Age: 84
Discharge: HOME | End: 2024-06-12
Payer: MEDICARE

## 2024-06-12 VITALS
OXYGEN SATURATION: 92 % | DIASTOLIC BLOOD PRESSURE: 72 MMHG | WEIGHT: 200 LBS | HEART RATE: 64 BPM | HEIGHT: 70 IN | BODY MASS INDEX: 28.63 KG/M2 | SYSTOLIC BLOOD PRESSURE: 128 MMHG | TEMPERATURE: 97.5 F | RESPIRATION RATE: 18 BRPM

## 2024-06-12 DIAGNOSIS — R16.0 LIVER MASS: ICD-10-CM

## 2024-06-12 LAB
APTT: 24.6 SECONDS (ref 25.1–37.1)
INR BLD: 1.1 INDEX
PROTHROMBIN TIME: 15.1 SECONDS (ref 11.7–14.5)

## 2024-06-12 PROCEDURE — 85610 PROTHROMBIN TIME: CPT

## 2024-06-12 PROCEDURE — 85730 THROMBOPLASTIN TIME PARTIAL: CPT

## 2024-06-12 RX ORDER — SODIUM CHLORIDE 0.9 % (FLUSH) 0.9 %
10 SYRINGE (ML) INJECTION PRN
Status: DISCONTINUED | OUTPATIENT
Start: 2024-06-12 | End: 2024-06-13 | Stop reason: HOSPADM

## 2024-06-12 NOTE — PROGRESS NOTES
IR PREPROCEDURE NOTE    11/12/24    CT FROM 5/2/24 REVIEWED, DISCUSSED WITH DR SHIPLEY, AFP LEVELS WITHIN NORMAL LIMITS PER DR SHIPLEY.  LESIONS SUBTLE ON CONTRAST ENHANCED CT.  PT DENIES HISTORY OF NEOPLASTIC DISEASE AND CAN NOT HAVE MRI.      FOLLOWING DISCUSSION WITH DR SHIPLEY, BIOPSY DEFERRED AT THIS TIME IN FAVOR OF REPEAT CT AND AFT/LAB TESTS TO EVALUATE STABILITY OF LIVER NODULES AND LABS.  PT CONSIDERED INCREASED RISK OF SAMPLING ERROR AND BLEEDING DUE TO UNDERLYING CIRRHOSIS.    BIOPSY DEFERRED AT THIS TIME    BRADFORD HILLIARD DO

## 2024-06-20 DIAGNOSIS — G25.3 MYOCLONUS: ICD-10-CM

## 2024-06-20 RX ORDER — ZONISAMIDE 100 MG/1
200 CAPSULE ORAL NIGHTLY
Qty: 180 CAPSULE | Refills: 0 | Status: SHIPPED | OUTPATIENT
Start: 2024-06-20

## 2024-07-10 ENCOUNTER — TELEPHONE (OUTPATIENT)
Dept: INTERNAL MEDICINE CLINIC | Age: 84
End: 2024-07-10

## 2024-07-10 ENCOUNTER — HOSPITAL ENCOUNTER (EMERGENCY)
Age: 84
Discharge: LEFT BEFORE DISCHARGE | End: 2024-07-10
Payer: MEDICARE

## 2024-07-10 VITALS
SYSTOLIC BLOOD PRESSURE: 123 MMHG | RESPIRATION RATE: 15 BRPM | WEIGHT: 200 LBS | HEART RATE: 88 BPM | TEMPERATURE: 98.4 F | DIASTOLIC BLOOD PRESSURE: 74 MMHG | OXYGEN SATURATION: 94 % | BODY MASS INDEX: 28.7 KG/M2

## 2024-07-10 PROCEDURE — 93005 ELECTROCARDIOGRAM TRACING: CPT | Performed by: EMERGENCY MEDICINE

## 2024-07-10 ASSESSMENT — PAIN SCALES - GENERAL
PAINLEVEL_OUTOF10: 5
PAINLEVEL_OUTOF10: 5

## 2024-07-10 ASSESSMENT — PAIN - FUNCTIONAL ASSESSMENT: PAIN_FUNCTIONAL_ASSESSMENT: 0-10

## 2024-07-10 ASSESSMENT — PAIN DESCRIPTION - ORIENTATION: ORIENTATION: RIGHT

## 2024-07-10 ASSESSMENT — PAIN DESCRIPTION - LOCATION
LOCATION: CHEST
LOCATION: CHEST

## 2024-07-10 NOTE — TELEPHONE ENCOUNTER
Pt wife called in and stated that the Pt has been having chest pain going on day 2- Pt has a DX of many cardiology issues and a pacemaker I advised them to go to the ER. Pt wife voiced understanding.

## 2024-07-11 ENCOUNTER — OFFICE VISIT (OUTPATIENT)
Dept: INTERNAL MEDICINE CLINIC | Age: 84
End: 2024-07-11
Payer: MEDICARE

## 2024-07-11 ENCOUNTER — HOSPITAL ENCOUNTER (OUTPATIENT)
Dept: CT IMAGING | Age: 84
Discharge: HOME | End: 2024-07-11
Payer: MEDICARE

## 2024-07-11 ENCOUNTER — HOSPITAL ENCOUNTER (OUTPATIENT)
Age: 84
Setting detail: SPECIMEN
Discharge: HOME | End: 2024-07-11
Payer: MEDICARE

## 2024-07-11 VITALS
WEIGHT: 198 LBS | DIASTOLIC BLOOD PRESSURE: 70 MMHG | BODY MASS INDEX: 28.41 KG/M2 | SYSTOLIC BLOOD PRESSURE: 136 MMHG | OXYGEN SATURATION: 88 % | HEART RATE: 96 BPM

## 2024-07-11 DIAGNOSIS — R10.11 RUQ PAIN: ICD-10-CM

## 2024-07-11 DIAGNOSIS — R10.11 RUQ PAIN: Primary | ICD-10-CM

## 2024-07-11 DIAGNOSIS — R06.02 SHORTNESS OF BREATH: ICD-10-CM

## 2024-07-11 LAB
ALBUMIN SERPL-MCNC: 4.4 GM/DL (ref 3.4–5)
ALP BLD-CCNC: 169 IU/L (ref 40–129)
ALT SERPL-CCNC: 15 U/L (ref 10–40)
ANION GAP SERPL CALCULATED.3IONS-SCNC: 12 MMOL/L (ref 7–16)
AST SERPL-CCNC: 19 IU/L (ref 15–37)
BASOPHILS ABSOLUTE: 0.1 K/CU MM
BASOPHILS RELATIVE PERCENT: 0.5 % (ref 0–1)
BILIRUB SERPL-MCNC: 0.5 MG/DL (ref 0–1)
BUN SERPL-MCNC: 28 MG/DL (ref 6–23)
CALCIUM SERPL-MCNC: 9.9 MG/DL (ref 8.3–10.6)
CHLORIDE BLD-SCNC: 106 MMOL/L (ref 99–110)
CO2: 23 MMOL/L (ref 21–32)
CREAT SERPL-MCNC: 1.4 MG/DL (ref 0.9–1.3)
DIFFERENTIAL TYPE: ABNORMAL
EOSINOPHILS ABSOLUTE: 0.1 K/CU MM
EOSINOPHILS RELATIVE PERCENT: 0.9 % (ref 0–3)
GFR, ESTIMATED: 49 ML/MIN/1.73M2
GLUCOSE SERPL-MCNC: 193 MG/DL (ref 70–99)
HCT VFR BLD CALC: 49.2 % (ref 42–52)
HEMOGLOBIN: 16.6 GM/DL (ref 13.5–18)
IMMATURE NEUTROPHIL %: 0.4 % (ref 0–0.43)
LIPASE: 15 IU/L (ref 13–60)
LYMPHOCYTES ABSOLUTE: 1.5 K/CU MM
LYMPHOCYTES RELATIVE PERCENT: 15.6 % (ref 24–44)
MCH RBC QN AUTO: 31.9 PG (ref 27–31)
MCHC RBC AUTO-ENTMCNC: 33.7 % (ref 32–36)
MCV RBC AUTO: 94.4 FL (ref 78–100)
MONOCYTES ABSOLUTE: 1.1 K/CU MM
MONOCYTES RELATIVE PERCENT: 11.1 % (ref 0–4)
NEUTROPHILS ABSOLUTE: 6.8 K/CU MM
NEUTROPHILS RELATIVE PERCENT: 71.5 % (ref 36–66)
NUCLEATED RBC %: 0 %
PDW BLD-RTO: 14.3 % (ref 11.7–14.9)
PLATELET # BLD: 183 K/CU MM (ref 140–440)
PMV BLD AUTO: 11.4 FL (ref 7.5–11.1)
POTASSIUM SERPL-SCNC: 4.3 MMOL/L (ref 3.5–5.1)
RBC # BLD: 5.21 M/CU MM (ref 4.6–6.2)
SODIUM BLD-SCNC: 141 MMOL/L (ref 135–145)
TOTAL IMMATURE NEUTOROPHIL: 0.04 K/CU MM
TOTAL NUCLEATED RBC: 0 K/CU MM
TOTAL PROTEIN: 7.3 GM/DL (ref 6.4–8.2)
WBC # BLD: 9.5 K/CU MM (ref 4–10.5)

## 2024-07-11 PROCEDURE — 99213 OFFICE O/P EST LOW 20 MIN: CPT

## 2024-07-11 PROCEDURE — G8427 DOCREV CUR MEDS BY ELIG CLIN: HCPCS

## 2024-07-11 PROCEDURE — G8417 CALC BMI ABV UP PARAM F/U: HCPCS

## 2024-07-11 PROCEDURE — 80053 COMPREHEN METABOLIC PANEL: CPT

## 2024-07-11 PROCEDURE — 6360000004 HC RX CONTRAST MEDICATION

## 2024-07-11 PROCEDURE — 2580000003 HC RX 258

## 2024-07-11 PROCEDURE — 85025 COMPLETE CBC W/AUTO DIFF WBC: CPT

## 2024-07-11 PROCEDURE — 1123F ACP DISCUSS/DSCN MKR DOCD: CPT

## 2024-07-11 PROCEDURE — 4004F PT TOBACCO SCREEN RCVD TLK: CPT

## 2024-07-11 PROCEDURE — 83690 ASSAY OF LIPASE: CPT

## 2024-07-11 PROCEDURE — 74178 CT ABD&PLV WO CNTR FLWD CNTR: CPT

## 2024-07-11 RX ORDER — SODIUM CHLORIDE 9 MG/ML
10 INJECTION, SOLUTION INTRAMUSCULAR; INTRAVENOUS; SUBCUTANEOUS PRN
Status: DISCONTINUED | OUTPATIENT
Start: 2024-07-11 | End: 2024-07-12 | Stop reason: HOSPADM

## 2024-07-11 RX ADMIN — IOPAMIDOL 75 ML: 755 INJECTION, SOLUTION INTRAVENOUS at 13:48

## 2024-07-11 RX ADMIN — SODIUM CHLORIDE, PRESERVATIVE FREE 10 ML: 5 INJECTION INTRAVENOUS at 13:46

## 2024-07-11 SDOH — ECONOMIC STABILITY: FOOD INSECURITY: WITHIN THE PAST 12 MONTHS, THE FOOD YOU BOUGHT JUST DIDN'T LAST AND YOU DIDN'T HAVE MONEY TO GET MORE.: NEVER TRUE

## 2024-07-11 SDOH — ECONOMIC STABILITY: INCOME INSECURITY: HOW HARD IS IT FOR YOU TO PAY FOR THE VERY BASICS LIKE FOOD, HOUSING, MEDICAL CARE, AND HEATING?: NOT HARD AT ALL

## 2024-07-11 SDOH — ECONOMIC STABILITY: FOOD INSECURITY: WITHIN THE PAST 12 MONTHS, YOU WORRIED THAT YOUR FOOD WOULD RUN OUT BEFORE YOU GOT MONEY TO BUY MORE.: NEVER TRUE

## 2024-07-11 ASSESSMENT — ENCOUNTER SYMPTOMS
COUGH: 0
SHORTNESS OF BREATH: 0
COLOR CHANGE: 0
CHOKING: 0
EYE PAIN: 0
SORE THROAT: 0
ABDOMINAL PAIN: 1
EYE DISCHARGE: 0
WHEEZING: 0
CHEST TIGHTNESS: 0
TROUBLE SWALLOWING: 0
FACIAL SWELLING: 0
EYE REDNESS: 0
VOMITING: 0
CONSTIPATION: 0
NAUSEA: 0
DIARRHEA: 0
STRIDOR: 0
RHINORRHEA: 0

## 2024-07-11 ASSESSMENT — VISUAL ACUITY: OU: 1

## 2024-07-11 NOTE — PROGRESS NOTES
no shifting dullness, fluid wave, splenomegaly, mass or pulsatile mass.      Tenderness: There is abdominal tenderness in the right upper quadrant. There is no right CVA tenderness, left CVA tenderness, guarding or rebound. Negative signs include Terry's sign, Rovsing's sign, McBurney's sign, psoas sign and obturator sign.      Hernia: No hernia is present.   Musculoskeletal:         General: Normal range of motion.      Cervical back: Normal range of motion.   Skin:     General: Skin is warm and dry.      Capillary Refill: Capillary refill takes less than 2 seconds.   Neurological:      General: No focal deficit present.      Mental Status: He is alert and oriented to person, place, and time. Mental status is at baseline.      GCS: GCS eye subscore is 4. GCS verbal subscore is 5. GCS motor subscore is 6.      Cranial Nerves: No cranial nerve deficit.      Sensory: Sensation is intact. No sensory deficit.      Motor: Motor function is intact.      Coordination: Coordination is intact.      Gait: Gait is intact.   Psychiatric:         Attention and Perception: Attention and perception normal.         Mood and Affect: Mood and affect normal.         Speech: Speech normal.         Behavior: Behavior normal. Behavior is cooperative.         Thought Content: Thought content normal.         Cognition and Memory: Cognition and memory normal.         Judgment: Judgment normal.          Assessment / Plan:        1. RUQ pain  Has hx of CT in May showing hepatic cirrhosis, to left hepatic lobe masses as well as a suspected additional mass in the right hepatic lobe. Due to acute nature of pain recommend updated scan to evaluate any changes within the abdomen that would necessitate emergent treatment. Patient agreeable to plan of care. Labs completed in office today. Sent for stat imaging to be completed. Also, contacted cardiology office and scheduled patient for appointment later this month d/t EKG changes found in ED earlier

## 2024-07-12 ENCOUNTER — TELEPHONE (OUTPATIENT)
Dept: INTERNAL MEDICINE CLINIC | Age: 84
End: 2024-07-12

## 2024-07-12 ENCOUNTER — TELEPHONE (OUTPATIENT)
Dept: GASTROENTEROLOGY | Age: 84
End: 2024-07-12

## 2024-07-12 LAB
EKG ATRIAL RATE: 153 BPM
EKG DIAGNOSIS: NORMAL
EKG Q-T INTERVAL: 416 MS
EKG QRS DURATION: 130 MS
EKG QTC CALCULATION (BAZETT): 458 MS
EKG R AXIS: -70 DEGREES
EKG T AXIS: 111 DEGREES
EKG VENTRICULAR RATE: 73 BPM

## 2024-07-12 PROCEDURE — 93010 ELECTROCARDIOGRAM REPORT: CPT | Performed by: INTERNAL MEDICINE

## 2024-07-12 NOTE — TELEPHONE ENCOUNTER
Called patient-he is still having a lot of discomfort but does not want to take any pain medications-instructed the patient-you mitali call him later and he verbalizes understanding

## 2024-07-12 NOTE — TELEPHONE ENCOUNTER
Called pt due to an urgent referral for cystic pancreatic lesion. Made appt for pt to see dr. Montaño on 7/17/24 @2:30pm

## 2024-07-17 ENCOUNTER — OFFICE VISIT (OUTPATIENT)
Dept: GASTROENTEROLOGY | Age: 84
End: 2024-07-17
Payer: MEDICARE

## 2024-07-17 ENCOUNTER — LAB (OUTPATIENT)
Dept: INTERNAL MEDICINE CLINIC | Age: 84
End: 2024-07-17
Payer: MEDICARE

## 2024-07-17 ENCOUNTER — HOSPITAL ENCOUNTER (OUTPATIENT)
Age: 84
Discharge: HOME | End: 2024-07-17
Payer: MEDICARE

## 2024-07-17 VITALS
TEMPERATURE: 97.3 F | SYSTOLIC BLOOD PRESSURE: 134 MMHG | BODY MASS INDEX: 28.37 KG/M2 | OXYGEN SATURATION: 90 % | WEIGHT: 198.2 LBS | HEIGHT: 70 IN | HEART RATE: 63 BPM | DIASTOLIC BLOOD PRESSURE: 68 MMHG

## 2024-07-17 DIAGNOSIS — E78.2 MIXED HYPERLIPIDEMIA: Primary | ICD-10-CM

## 2024-07-17 DIAGNOSIS — K86.2 PANCREATIC CYST: Primary | ICD-10-CM

## 2024-07-17 DIAGNOSIS — K74.60 CIRRHOSIS OF LIVER WITHOUT ASCITES, UNSPECIFIED HEPATIC CIRRHOSIS TYPE (HCC): ICD-10-CM

## 2024-07-17 DIAGNOSIS — K76.9 LIVER LESION: ICD-10-CM

## 2024-07-17 DIAGNOSIS — E11.69 TYPE 2 DIABETES MELLITUS WITH OTHER SPECIFIED COMPLICATION, UNSPECIFIED WHETHER LONG TERM INSULIN USE (HCC): ICD-10-CM

## 2024-07-17 LAB
ALBUMIN SERPL-MCNC: 4.5 GM/DL (ref 3.4–5)
ALP BLD-CCNC: 174 IU/L (ref 40–129)
ALT SERPL-CCNC: 14 U/L (ref 10–40)
ANION GAP SERPL CALCULATED.3IONS-SCNC: 14 MMOL/L (ref 7–16)
AST SERPL-CCNC: 19 IU/L (ref 15–37)
BILIRUB SERPL-MCNC: 0.3 MG/DL (ref 0–1)
BUN SERPL-MCNC: 30 MG/DL (ref 6–23)
CALCIUM SERPL-MCNC: 9.8 MG/DL (ref 8.3–10.6)
CHLORIDE BLD-SCNC: 106 MMOL/L (ref 99–110)
CHOLEST SERPL-MCNC: 127 MG/DL (ref 0–199)
CO2: 22 MMOL/L (ref 21–32)
CREAT SERPL-MCNC: 1.4 MG/DL (ref 0.9–1.3)
EST. AVERAGE GLUCOSE BLD GHB EST-MCNC: 151.3 MG/DL
FERRITIN: 64 NG/ML (ref 30–400)
GFR, ESTIMATED: 49 ML/MIN/1.73M2
GLUCOSE SERPL-MCNC: 95 MG/DL (ref 70–99)
HBA1C MFR BLD: 6.9 %
HCT VFR BLD CALC: 53.1 % (ref 42–52)
HDLC SERPL-MCNC: 67 MG/DL (ref 40–60)
HEMOGLOBIN: 16.5 GM/DL (ref 13.5–18)
INR BLD: 1.1 INDEX
IRON: 75 UG/DL (ref 59–158)
LDLC SERPL CALC-MCNC: 44 MG/DL
MCH RBC QN AUTO: 32 PG (ref 27–31)
MCHC RBC AUTO-ENTMCNC: 31.1 % (ref 32–36)
MCV RBC AUTO: 102.9 FL (ref 78–100)
PCT TRANSFERRIN: 24 % (ref 10–44)
PDW BLD-RTO: 15.3 % (ref 11.7–14.9)
PLATELET # BLD: 174 K/CU MM (ref 140–440)
PMV BLD AUTO: 11.6 FL (ref 7.5–11.1)
POTASSIUM SERPL-SCNC: 4.9 MMOL/L (ref 3.5–5.1)
PROTHROMBIN TIME: 14.3 SECONDS (ref 11.7–14.5)
RBC # BLD: 5.16 M/CU MM (ref 4.6–6.2)
SODIUM BLD-SCNC: 142 MMOL/L (ref 135–145)
TOTAL IRON BINDING CAPACITY: 310 UG/DL (ref 250–450)
TOTAL PROTEIN: 7.4 GM/DL (ref 6.4–8.2)
TRIGL SERPL-MCNC: 80 MG/DL (ref 0–150)
UNSATURATED IRON BINDING CAPACITY: 235 UG/DL (ref 110–370)
VLDLC SERPL CALC-MCNC: 16 MG/DL
WBC # BLD: 8 K/CU MM (ref 4–10.5)

## 2024-07-17 PROCEDURE — 36415 COLL VENOUS BLD VENIPUNCTURE: CPT | Performed by: FAMILY MEDICINE

## 2024-07-17 PROCEDURE — 86256 FLUORESCENT ANTIBODY TITER: CPT

## 2024-07-17 PROCEDURE — G8417 CALC BMI ABV UP PARAM F/U: HCPCS | Performed by: INTERNAL MEDICINE

## 2024-07-17 PROCEDURE — 1123F ACP DISCUSS/DSCN MKR DOCD: CPT | Performed by: INTERNAL MEDICINE

## 2024-07-17 PROCEDURE — 99204 OFFICE O/P NEW MOD 45 MIN: CPT | Performed by: INTERNAL MEDICINE

## 2024-07-17 PROCEDURE — 85027 COMPLETE CBC AUTOMATED: CPT

## 2024-07-17 PROCEDURE — 86038 ANTINUCLEAR ANTIBODIES: CPT

## 2024-07-17 PROCEDURE — 82103 ALPHA-1-ANTITRYPSIN TOTAL: CPT

## 2024-07-17 PROCEDURE — 80053 COMPREHEN METABOLIC PANEL: CPT

## 2024-07-17 PROCEDURE — 83540 ASSAY OF IRON: CPT

## 2024-07-17 PROCEDURE — 83550 IRON BINDING TEST: CPT

## 2024-07-17 PROCEDURE — 82390 ASSAY OF CERULOPLASMIN: CPT

## 2024-07-17 PROCEDURE — G8427 DOCREV CUR MEDS BY ELIG CLIN: HCPCS | Performed by: INTERNAL MEDICINE

## 2024-07-17 PROCEDURE — 36415 COLL VENOUS BLD VENIPUNCTURE: CPT

## 2024-07-17 PROCEDURE — 4004F PT TOBACCO SCREEN RCVD TLK: CPT | Performed by: INTERNAL MEDICINE

## 2024-07-17 PROCEDURE — 82728 ASSAY OF FERRITIN: CPT

## 2024-07-17 PROCEDURE — 83516 IMMUNOASSAY NONANTIBODY: CPT

## 2024-07-17 PROCEDURE — 85610 PROTHROMBIN TIME: CPT

## 2024-07-17 RX ORDER — LACTULOSE 10 G/15ML
10 SOLUTION ORAL EVERY EVENING
Qty: 473 ML | Refills: 1 | Status: SHIPPED | OUTPATIENT
Start: 2024-07-17

## 2024-07-17 NOTE — PROGRESS NOTES
Date:   7/17/2025    Iron and TIBC     Standing Status:   Future     Standing Expiration Date:   7/17/2025    Ferritin     Standing Status:   Future     Standing Expiration Date:   7/17/2025    Alpha-1-Antitrypsin     Standing Status:   Future     Standing Expiration Date:   7/17/2025    Ceruloplasmin     Standing Status:   Future     Standing Expiration Date:   7/17/2025    Comprehensive Metabolic Panel     Standing Status:   Future     Standing Expiration Date:   7/17/2025    CBC     Standing Status:   Future     Standing Expiration Date:   7/17/2025    Protime-INR     Standing Status:   Future     Standing Expiration Date:   7/17/2025     Order Specific Question:   Daily Coumadin Dose?     Answer:   None     #1 pancreatic cyst.  Patient noted to have a cystic pancreatic lesion noted to be very small about 5 mm x 6 mm in the pancreatic body.  No associated ductal dilation noted.  His previous CT was done in May 2024 at outside facility at [facility] does not make any note of pancreatic cyst.  Had a long discussion with the patient.  Currently he is 84-year-old with multiple comorbidities including his liver cirrhosis, on oral anticoagulation.  Ideally would have preferred an MRI to further characterize his cyst however he mentions he cannot undergo MRI.  He is also on chronic long-term oxygen.  He denies any jaundice, melena or hematochezia.  I discussed with him that options include doing an endoscopic ultrasound to better characterize the cyst and to see if there is any solid component or need for drainage.  The cyst however is very small and will be difficult to biopsy.  We will repeat CT pancreas protocol in 3 months to follow-up on the cyst.  If he gets any concerning signs like weight loss, abdominal pain, nausea, vomiting or jaundice then will consider EUS.    #2 liver cirrhosis  - MELD labs ordered.   - Patient denies any ETOH intake   Recommend MELD labs  - r/o other etiologies.   - Per patients wife he

## 2024-07-18 ENCOUNTER — TELEPHONE (OUTPATIENT)
Dept: GASTROENTEROLOGY | Age: 84
End: 2024-07-18

## 2024-07-18 NOTE — RESULT ENCOUNTER NOTE
Please call the patient and inform them that their CMP was noted to be stable LFTs also remained stable complete blood count reveals no anemia.  Ferritin is also noted to be normal as well as iron and to pick PT/INR is stable

## 2024-07-18 NOTE — TELEPHONE ENCOUNTER
Called pt to discuss results, his wife answered and stated she expressed verbal understanding and she'd let the pt know.         ----- Message from ORVILLE Shah CNP sent at 7/18/2024  1:27 PM EDT -----  Please call the patient and inform them that their CMP was noted to be stable LFTs also remained stable complete blood count reveals no anemia.  Ferritin is also noted to be normal as well as iron and to pick PT/INR is stable   DISCHARGE SUMMARY from Nurse PATIENT INSTRUCTIONS: 
 
After general anesthesia or intravenous sedation, for 24 hours or while taking prescription Narcotics: · Limit your activities · Do not drive and operate hazardous machinery · Do not make important personal or business decisions · Do  not drink alcoholic beverages · If you have not urinated within 8 hours after discharge, please contact your surgeon on call. Report the following to your surgeon: 
· Excessive pain, swelling, redness or odor of or around the surgical area · Temperature over 100.5 · Nausea and vomiting lasting longer than 4 hours or if unable to take medications · Any signs of decreased circulation or nerve impairment to extremity: change in color, persistent  numbness, tingling, coldness or increase pain · Any questions What to do at Home: 
Recommended activity: Activity as tolerated, If you experience any of the following symptoms bleeding, decreased fetal movement, contractions, pain water breaks, please follow up with MD. 
 
*  Please give a list of your current medications to your Primary Care Provider. *  Please update this list whenever your medications are discontinued, doses are 
    changed, or new medications (including over-the-counter products) are added. *  Please carry medication information at all times in case of emergency situations. These are general instructions for a healthy lifestyle: No smoking/ No tobacco products/ Avoid exposure to second hand smoke Surgeon General's Warning:  Quitting smoking now greatly reduces serious risk to your health. Obesity, smoking, and sedentary lifestyle greatly increases your risk for illness A healthy diet, regular physical exercise & weight monitoring are important for maintaining a healthy lifestyle You may be retaining fluid if you have a history of heart failure or if you experience any of the following symptoms:  Weight gain of 3 pounds or more overnight or 5 pounds in a week, increased swelling in our hands or feet or shortness of breath while lying flat in bed. Please call your doctor as soon as you notice any of these symptoms; do not wait until your next office visit. Recognize signs and symptoms of STROKE: 
 
F-face looks uneven A-arms unable to move or move unevenly S-speech slurred or non-existent T-time-call 911 as soon as signs and symptoms begin-DO NOT go Back to bed or wait to see if you get better-TIME IS BRAIN. Warning Signs of HEART ATTACK Call 911 if you have these symptoms: 
? Chest discomfort. Most heart attacks involve discomfort in the center of the chest that lasts more than a few minutes, or that goes away and comes back. It can feel like uncomfortable pressure, squeezing, fullness, or pain. ? Discomfort in other areas of the upper body. Symptoms can include pain or discomfort in one or both arms, the back, neck, jaw, or stomach. ? Shortness of breath with or without chest discomfort. ? Other signs may include breaking out in a cold sweat, nausea, or lightheadedness. Don't wait more than five minutes to call [address]! Fast action can save your life. Calling 911 is almost always the fastest way to get lifesaving treatment. Emergency Medical Services staff can begin treatment when they arrive  up to an hour sooner than if someone gets to the hospital by car. The discharge information has been reviewed with the patient. The patient verbalized understanding. Discharge medications reviewed with the patient and appropriate educational materials and side effects teaching were provided. ___________________________________________________________________________________________________________________________________

## 2024-07-19 LAB — A1AT SERPL-MCNC: 188 MG/DL (ref 90–200)

## 2024-07-20 LAB
CERULOPLASMIN SERPL-MCNC: 29 MG/DL (ref 15–30)
MITOCHONDRIA M2 IGG SER-ACNC: 24 UNITS (ref 0–24.9)
NUCLEAR IGG SER QL IA: DETECTED
SMA IGG SER-ACNC: 20 UNITS (ref 0–19)

## 2024-07-21 LAB
ANA PATTERN: ABNORMAL
ANA TITER: ABNORMAL
ANTINUCLEAR ANTIBODY, HEP-2, IGG: DETECTED
INTERPRETATION: ABNORMAL
SMOOTH MUSCLE AB IGG TITER: ABNORMAL

## 2024-07-23 ENCOUNTER — OFFICE VISIT (OUTPATIENT)
Dept: INTERNAL MEDICINE CLINIC | Age: 84
End: 2024-07-23
Payer: MEDICARE

## 2024-07-23 VITALS
DIASTOLIC BLOOD PRESSURE: 62 MMHG | WEIGHT: 195 LBS | HEART RATE: 64 BPM | SYSTOLIC BLOOD PRESSURE: 112 MMHG | OXYGEN SATURATION: 93 % | BODY MASS INDEX: 27.98 KG/M2

## 2024-07-23 DIAGNOSIS — K74.69 OTHER CIRRHOSIS OF LIVER (HCC): ICD-10-CM

## 2024-07-23 DIAGNOSIS — E78.2 MIXED HYPERLIPIDEMIA: ICD-10-CM

## 2024-07-23 DIAGNOSIS — Z00.00 MEDICARE ANNUAL WELLNESS VISIT, SUBSEQUENT: Primary | ICD-10-CM

## 2024-07-23 DIAGNOSIS — E03.4 HYPOTHYROIDISM DUE TO ACQUIRED ATROPHY OF THYROID: ICD-10-CM

## 2024-07-23 DIAGNOSIS — K86.9 PANCREATIC LESION: ICD-10-CM

## 2024-07-23 DIAGNOSIS — E11.69 TYPE 2 DIABETES MELLITUS WITH OTHER SPECIFIED COMPLICATION, UNSPECIFIED WHETHER LONG TERM INSULIN USE (HCC): ICD-10-CM

## 2024-07-23 DIAGNOSIS — I48.91 ATRIAL FIBRILLATION, UNSPECIFIED TYPE (HCC): ICD-10-CM

## 2024-07-23 DIAGNOSIS — I15.9 HYPERTENSION, SECONDARY: ICD-10-CM

## 2024-07-23 PROCEDURE — 1123F ACP DISCUSS/DSCN MKR DOCD: CPT | Performed by: FAMILY MEDICINE

## 2024-07-23 PROCEDURE — 3044F HG A1C LEVEL LT 7.0%: CPT | Performed by: FAMILY MEDICINE

## 2024-07-23 PROCEDURE — G0439 PPPS, SUBSEQ VISIT: HCPCS | Performed by: FAMILY MEDICINE

## 2024-07-23 NOTE — PATIENT INSTRUCTIONS
Learning About Being Active as an Older Adult  Why is being active important as you get older?     Being active is one of the best things you can do for your health. And it's never too late to start. Being active--or getting active, if you aren't already--has definite benefits. It can:  Give you more energy,  Keep your mind sharp.  Improve balance to reduce your risk of falls.  Help you manage chronic illness with fewer medicines.  No matter how old you are, how fit you are, or what health problems you have, there is a form of activity that will work for you. And the more physical activity you can do, the better your overall health will be.  What kinds of activity can help you stay healthy?  Being more active will make your daily activities easier. Physical activity includes planned exercise and things you do in daily life. There are four types of activity:  Aerobic.  Doing aerobic activity makes your heart and lungs strong.  Includes walking, dancing, and gardening.  Aim for at least 2½ hours spread throughout the week.  It improves your energy and can help you sleep better.  Muscle-strengthening.  This type of activity can help maintain muscle and strengthen bones.  Includes climbing stairs, using resistance bands, and lifting or carrying heavy loads.  Aim for at least twice a week.  It can help protect the knees and other joints.  Stretching.  Stretching gives you better range of motion in joints and muscles.  Includes upper arm stretches, calf stretches, and gentle yoga.  Aim for at least twice a week, preferably after your muscles are warmed up from other activities.  It can help you function better in daily life.  Balancing.  This helps you stay coordinated and have good posture.  Includes heel-to-toe walking, marla chi, and certain types of yoga.  Aim for at least 3 days a week.  It can reduce your risk of falling.  Even if you have a hard time meeting the recommendations, it's better to be more active

## 2024-07-23 NOTE — PROGRESS NOTES
Medicare Annual Wellness Visit    Devyn Kirk is here for Follow-up (4 mth f/u) and Medicare AWV    Assessment & Plan   Medicare annual wellness visit, subsequent  Hypothyroidism due to acquired atrophy of thyroid  Last TSH wnl.  Will monitor labs, continue current dose of synthroid.   -     TSH; Future  Type 2 diabetes mellitus with other specified complication, unspecified whether long term insulin use (HCC)  Well controlled with diet, will continue to monitor.  -     Hemoglobin A1C; Future  Mixed hyperlipidemia  Continue repatha.  -     Lipid Panel; Future  Hypertension, secondary  Stable, well controlled.  Continue current medications.   -     CBC with Auto Differential; Future  -     Comprehensive Metabolic Panel; Future  Atrial fibrillation, unspecified type (HCC)  Has been switched from coumadin to eliquis- continue following with cardiology.  Pancreatic lesion  Monitoring per GI. Repeat imaging in 3 months.   Other cirrhosis of liver (HCC)  Now on lactulose, continue following with GI.      Recommendations for Preventive Services Due: see orders and patient instructions/AVS.  Recommended screening schedule for the next 5-10 years is provided to the patient in written form: see Patient Instructions/AVS.     Return in 4 months (on 11/23/2024).     Subjective   The following acute and/or chronic problems were also addressed today:    Has been following with GI for liver lesions and cystic pancreatic lesion.  Was considering biopsy but given small size of lesions and anticoagulation, decided to monitor for now.  Will be getting repeat CT in 3 months.  Was also started on lactulose- states he has not had any issues with diarrhea or other side effects.      Coumadin and ASA were also discontinued by cardiology about a month ago.  Was started on eliquis 2.5mg BID.      Patient has been tolerating without issues.       Tremor:  Still doing well on austedo.  Following with neurology.       HLP:  Has been  Moderna dose 1 and 2

## 2024-08-13 RX ORDER — LACTULOSE 10 G/15ML
10 SOLUTION ORAL EVERY EVENING
Qty: 1419 ML | Refills: 1 | Status: SHIPPED | OUTPATIENT
Start: 2024-08-13

## 2024-08-16 ENCOUNTER — OFFICE VISIT (OUTPATIENT)
Dept: GASTROENTEROLOGY | Age: 84
End: 2024-08-16
Payer: MEDICARE

## 2024-08-16 VITALS
TEMPERATURE: 97 F | SYSTOLIC BLOOD PRESSURE: 136 MMHG | OXYGEN SATURATION: 90 % | BODY MASS INDEX: 28.35 KG/M2 | HEIGHT: 70 IN | WEIGHT: 198 LBS | DIASTOLIC BLOOD PRESSURE: 74 MMHG | HEART RATE: 69 BPM

## 2024-08-16 DIAGNOSIS — K76.9 LIVER LESION: ICD-10-CM

## 2024-08-16 DIAGNOSIS — E78.2 MIXED HYPERLIPIDEMIA: ICD-10-CM

## 2024-08-16 DIAGNOSIS — K74.60 CIRRHOSIS OF LIVER WITHOUT ASCITES, UNSPECIFIED HEPATIC CIRRHOSIS TYPE (HCC): Primary | ICD-10-CM

## 2024-08-16 PROCEDURE — G8427 DOCREV CUR MEDS BY ELIG CLIN: HCPCS | Performed by: INTERNAL MEDICINE

## 2024-08-16 PROCEDURE — 99214 OFFICE O/P EST MOD 30 MIN: CPT | Performed by: INTERNAL MEDICINE

## 2024-08-16 PROCEDURE — G8417 CALC BMI ABV UP PARAM F/U: HCPCS | Performed by: INTERNAL MEDICINE

## 2024-08-16 PROCEDURE — 4004F PT TOBACCO SCREEN RCVD TLK: CPT | Performed by: INTERNAL MEDICINE

## 2024-08-16 PROCEDURE — 1123F ACP DISCUSS/DSCN MKR DOCD: CPT | Performed by: INTERNAL MEDICINE

## 2024-08-16 RX ORDER — EVOLOCUMAB 140 MG/ML
INJECTION, SOLUTION SUBCUTANEOUS
Qty: 3 EACH | Refills: 2 | Status: SHIPPED | OUTPATIENT
Start: 2024-08-16

## 2024-08-16 NOTE — PROGRESS NOTES
[facility]             MD Amelia Lebron MD Carol Christensen, APRN-CNP       Mercy Couch, APRN-CNP             [address] [place] [address]             [phone] fax [phone]        Gastroenterology Clinic Consultation    Amelia Montaño MD  Encounter Date: 08/16/24     CC: Follow-up (1 month f/u )       No referring provider defined for this encounter.     History obtained from: patient, medical records     Subjective:       Devyn Kirk is an 84 y.o. male with past medical history of A-fib on Coumadin, history of adenomatous colon polyp, recent diagnosis of liver cirrhosis with possible liver mass who presents for Follow-up (1 month f/u )  08/16/2024  Since his last visit the patient has had blood workup done which showed ASMA to be weakly positive, FRANKLIN was also positive.  Alpha 1 antitrypsin, ceruloplasmin noted to be normal.  Ferritin 64.   He mentions he is taking lactulose daily and pooping 2-3 times   Denies melena, hematochezia, GERD, nausea, Dysphagia.     7/17/2024  On review of patient's chart he was diagnosed with recent CT showing nodular liver with concern of liver masses.  He has been established with Dr. Baez as his GI doctor He had a CT scan done in May 2, 2024 which revealed cirrhotic morphology with 2.6 x 3.1 x 2.8 cm subtle mass in segment 2 3, redemonstration of 1.6 x 1.8 cm left hepatic dome segment 2 mass and an additional 2.3 x 2.4 cm mass suspected in segment 4B none of these masses appear to demonstrate arterial hyperenhancement.  The pancreas was noted to have no ductal dilation no parenchymal lesion.  He was subsequently scheduled for liver biopsy however after discussion of IR with Dr. Baez the plan was made to hold off on his liver biopsy due to lesions being small, AFP being normal and risk of bleeding in the setting of cirrhosis.  Patient did have a CT scan follow-up

## 2024-08-22 DIAGNOSIS — G25.2 RESTING TREMOR: ICD-10-CM

## 2024-08-22 RX ORDER — TRIHEXYPHENIDYL HYDROCHLORIDE 2 MG/1
TABLET ORAL
Qty: 90 TABLET | Refills: 1 | Status: SHIPPED | OUTPATIENT
Start: 2024-08-22

## 2024-08-23 RX ORDER — FAMOTIDINE 20 MG/1
TABLET, FILM COATED ORAL
Qty: 90 TABLET | Refills: 1 | Status: SHIPPED | OUTPATIENT
Start: 2024-08-23

## 2024-09-11 DIAGNOSIS — G25.3 MYOCLONUS: ICD-10-CM

## 2024-09-11 NOTE — TELEPHONE ENCOUNTER
Called patient to make follow up appointment.  Scheduled patient for tomorrow, will refill medication at that time.

## 2024-09-12 ENCOUNTER — OFFICE VISIT (OUTPATIENT)
Dept: NEUROLOGY | Age: 84
End: 2024-09-12
Payer: MEDICARE

## 2024-09-12 VITALS
WEIGHT: 197.4 LBS | OXYGEN SATURATION: 90 % | HEART RATE: 87 BPM | DIASTOLIC BLOOD PRESSURE: 74 MMHG | SYSTOLIC BLOOD PRESSURE: 132 MMHG | BODY MASS INDEX: 28.32 KG/M2

## 2024-09-12 DIAGNOSIS — G25.3 MYOCLONUS: ICD-10-CM

## 2024-09-12 PROCEDURE — 99213 OFFICE O/P EST LOW 20 MIN: CPT | Performed by: NURSE PRACTITIONER

## 2024-09-12 PROCEDURE — 1123F ACP DISCUSS/DSCN MKR DOCD: CPT | Performed by: NURSE PRACTITIONER

## 2024-09-12 PROCEDURE — G8417 CALC BMI ABV UP PARAM F/U: HCPCS | Performed by: NURSE PRACTITIONER

## 2024-09-12 PROCEDURE — G8427 DOCREV CUR MEDS BY ELIG CLIN: HCPCS | Performed by: NURSE PRACTITIONER

## 2024-09-12 PROCEDURE — 4004F PT TOBACCO SCREEN RCVD TLK: CPT | Performed by: NURSE PRACTITIONER

## 2024-09-12 RX ORDER — GABAPENTIN 400 MG/1
400 CAPSULE ORAL 2 TIMES DAILY
Qty: 180 CAPSULE | Refills: 3 | Status: SHIPPED | OUTPATIENT
Start: 2024-09-12 | End: 2025-09-12

## 2024-09-12 RX ORDER — ZONISAMIDE 100 MG/1
CAPSULE ORAL
Qty: 90 CAPSULE | Refills: 1 | OUTPATIENT
Start: 2024-09-12

## 2024-09-12 RX ORDER — ZONISAMIDE 100 MG/1
100 CAPSULE ORAL NIGHTLY
Qty: 90 CAPSULE | Refills: 1 | Status: CANCELLED | OUTPATIENT
Start: 2024-09-12

## 2024-09-16 ENCOUNTER — HOSPITAL ENCOUNTER (OUTPATIENT)
Age: 84
Discharge: HOME | End: 2024-09-16
Payer: MEDICARE

## 2024-09-16 DIAGNOSIS — K74.60 CIRRHOSIS OF LIVER WITHOUT ASCITES, UNSPECIFIED HEPATIC CIRRHOSIS TYPE (HCC): ICD-10-CM

## 2024-09-16 LAB
ALBUMIN SERPL-MCNC: 4.4 G/DL (ref 3.4–5)
ALBUMIN/GLOB SERPL: 1.7 {RATIO} (ref 1.1–2.2)
ALP SERPL-CCNC: 165 U/L (ref 40–129)
ALT SERPL-CCNC: 18 U/L (ref 10–40)
ANION GAP SERPL CALCULATED.3IONS-SCNC: 11 MMOL/L (ref 9–17)
AST SERPL-CCNC: 22 U/L (ref 15–37)
BILIRUB SERPL-MCNC: 0.6 MG/DL (ref 0–1)
BUN SERPL-MCNC: 27 MG/DL (ref 7–20)
CALCIUM SERPL-MCNC: 9.3 MG/DL (ref 8.3–10.6)
CHLORIDE SERPL-SCNC: 105 MMOL/L (ref 99–110)
CO2 SERPL-SCNC: 26 MMOL/L (ref 21–32)
CREAT SERPL-MCNC: 1.6 MG/DL (ref 0.8–1.3)
ERYTHROCYTE [DISTWIDTH] IN BLOOD BY AUTOMATED COUNT: 15.4 % (ref 11.7–14.9)
GFR, ESTIMATED: 41 ML/MIN/1.73M2
GLUCOSE SERPL-MCNC: 120 MG/DL (ref 74–99)
HCT VFR BLD AUTO: 49.7 % (ref 42–52)
HGB BLD-MCNC: 16.3 G/DL (ref 13.5–18)
INR PPP: 1
MCH RBC QN AUTO: 32.4 PG (ref 27–31)
MCHC RBC AUTO-ENTMCNC: 32.8 G/DL (ref 32–36)
MCV RBC AUTO: 98.8 FL (ref 78–100)
PLATELET # BLD AUTO: 155 K/UL (ref 140–440)
PMV BLD AUTO: 11.6 FL (ref 7.5–11.1)
POTASSIUM SERPL-SCNC: 4.8 MMOL/L (ref 3.5–5.1)
PROT SERPL-MCNC: 6.9 G/DL (ref 6.4–8.2)
PROTHROMBIN TIME: 13.5 SEC (ref 11.7–14.5)
RBC # BLD AUTO: 5.03 M/UL (ref 4.6–6.2)
SODIUM SERPL-SCNC: 142 MMOL/L (ref 136–145)
WBC OTHER # BLD: 9.2 K/UL (ref 4–10.5)

## 2024-09-16 PROCEDURE — 85610 PROTHROMBIN TIME: CPT

## 2024-09-16 PROCEDURE — 83516 IMMUNOASSAY NONANTIBODY: CPT

## 2024-09-16 PROCEDURE — 85027 COMPLETE CBC AUTOMATED: CPT

## 2024-09-16 PROCEDURE — 36415 COLL VENOUS BLD VENIPUNCTURE: CPT

## 2024-09-16 PROCEDURE — 80053 COMPREHEN METABOLIC PANEL: CPT

## 2024-09-16 PROCEDURE — 82105 ALPHA-FETOPROTEIN SERUM: CPT

## 2024-09-17 ENCOUNTER — TELEPHONE (OUTPATIENT)
Dept: GASTROENTEROLOGY | Age: 84
End: 2024-09-17

## 2024-09-18 LAB — AFP-TM SERPL-MCNC: 2 NG/ML (ref 0–9)

## 2024-09-19 LAB — SMOOTH MUSCLE ANTIBODY: 18 UNITS (ref 0–19)

## 2024-09-24 ENCOUNTER — HOSPITAL ENCOUNTER (OUTPATIENT)
Dept: CT IMAGING | Age: 84
Discharge: HOME | End: 2024-09-24
Attending: INTERNAL MEDICINE
Payer: MEDICARE

## 2024-09-24 DIAGNOSIS — K74.60 CIRRHOSIS OF LIVER WITHOUT ASCITES, UNSPECIFIED HEPATIC CIRRHOSIS TYPE (HCC): ICD-10-CM

## 2024-09-24 DIAGNOSIS — K76.9 LIVER LESION: ICD-10-CM

## 2024-09-24 DIAGNOSIS — K86.2 PANCREATIC CYST: ICD-10-CM

## 2024-09-24 PROCEDURE — 6360000004 HC RX CONTRAST MEDICATION: Performed by: INTERNAL MEDICINE

## 2024-09-24 PROCEDURE — 2580000003 HC RX 258: Performed by: INTERNAL MEDICINE

## 2024-09-24 PROCEDURE — 74178 CT ABD&PLV WO CNTR FLWD CNTR: CPT

## 2024-09-24 RX ORDER — IOPAMIDOL 755 MG/ML
90 INJECTION, SOLUTION INTRAVASCULAR
Status: COMPLETED | OUTPATIENT
Start: 2024-09-24 | End: 2024-09-24

## 2024-09-24 RX ORDER — SODIUM CHLORIDE 9 MG/ML
10 INJECTION, SOLUTION INTRAMUSCULAR; INTRAVENOUS; SUBCUTANEOUS PRN
Status: DISCONTINUED | OUTPATIENT
Start: 2024-09-24 | End: 2024-09-25 | Stop reason: HOSPADM

## 2024-09-24 RX ADMIN — IOPAMIDOL 90 ML: 755 INJECTION, SOLUTION INTRAVENOUS at 09:18

## 2024-09-24 RX ADMIN — SODIUM CHLORIDE, PRESERVATIVE FREE 10 ML: 5 INJECTION INTRAVENOUS at 09:16

## 2024-09-26 DIAGNOSIS — Z23 INFLUENZA VACCINE ADMINISTERED: Primary | ICD-10-CM

## 2024-10-02 ENCOUNTER — TELEPHONE (OUTPATIENT)
Dept: GASTROENTEROLOGY | Age: 84
End: 2024-10-02

## 2024-10-02 NOTE — TELEPHONE ENCOUNTER
----- Message from ORVILLE Zhang CNP sent at 10/2/2024  2:05 PM EDT -----  Please call the patient and inform them that their CT of the abdomen showed no hepatic or pancreatic mass.  There was still a small midsection on the left kidney where the radiologist is recommending a repeat CT scan in 6 months to document stability and/or resolution.

## 2024-10-11 ENCOUNTER — TELEPHONE (OUTPATIENT)
Dept: GASTROENTEROLOGY | Age: 84
End: 2024-10-11

## 2024-10-11 NOTE — TELEPHONE ENCOUNTER
Called pt to discuss, his wife answered. We discussed results and she expressed verbal understanding.       ----- Message from ORVILLE Zhang CNP sent at 10/2/2024  2:05 PM EDT -----  Please call the patient and inform them that their CT of the abdomen showed no hepatic or pancreatic mass.  There was still a small midsection on the left kidney where the radiologist is recommending a repeat CT scan in 6 months to document stability and/or resolution.

## 2024-10-18 ENCOUNTER — HOSPITAL ENCOUNTER (OUTPATIENT)
Age: 84
Discharge: HOME | End: 2024-10-18
Payer: MEDICARE

## 2024-10-18 PROCEDURE — 84153 ASSAY OF PSA TOTAL: CPT

## 2024-10-18 PROCEDURE — 84154 ASSAY OF PSA FREE: CPT

## 2024-10-18 PROCEDURE — 36415 COLL VENOUS BLD VENIPUNCTURE: CPT

## 2024-10-20 LAB
PROSTATE SPECIFIC ANTIGEN FREE: 1.1 NG/ML
PROSTATE SPECIFIC ANTIGEN PERCENT FREE: 22 %
PROSTATE SPECIFIC ANTIGEN: 4.9 NG/ML (ref 0–4)

## 2024-10-22 ENCOUNTER — TELEPHONE (OUTPATIENT)
Dept: INTERNAL MEDICINE CLINIC | Age: 84
End: 2024-10-22

## 2024-10-22 NOTE — TELEPHONE ENCOUNTER
Patient's wife called in for Ropinirole asking about dosage. Call to clarify how to take the medication.

## 2024-11-13 ENCOUNTER — OFFICE VISIT (OUTPATIENT)
Dept: GASTROENTEROLOGY | Age: 84
End: 2024-11-13
Payer: MEDICARE

## 2024-11-13 VITALS
DIASTOLIC BLOOD PRESSURE: 62 MMHG | SYSTOLIC BLOOD PRESSURE: 120 MMHG | HEIGHT: 70 IN | WEIGHT: 200.6 LBS | RESPIRATION RATE: 16 BRPM | HEART RATE: 81 BPM | OXYGEN SATURATION: 93 % | BODY MASS INDEX: 28.72 KG/M2

## 2024-11-13 DIAGNOSIS — K74.60 CIRRHOSIS OF LIVER WITHOUT ASCITES, UNSPECIFIED HEPATIC CIRRHOSIS TYPE (HCC): Primary | ICD-10-CM

## 2024-11-13 DIAGNOSIS — K76.9 LIVER LESION: ICD-10-CM

## 2024-11-13 PROCEDURE — 1123F ACP DISCUSS/DSCN MKR DOCD: CPT | Performed by: INTERNAL MEDICINE

## 2024-11-13 PROCEDURE — 4004F PT TOBACCO SCREEN RCVD TLK: CPT | Performed by: INTERNAL MEDICINE

## 2024-11-13 PROCEDURE — G8427 DOCREV CUR MEDS BY ELIG CLIN: HCPCS | Performed by: INTERNAL MEDICINE

## 2024-11-13 PROCEDURE — 99214 OFFICE O/P EST MOD 30 MIN: CPT | Performed by: INTERNAL MEDICINE

## 2024-11-13 PROCEDURE — 1159F MED LIST DOCD IN RCRD: CPT | Performed by: INTERNAL MEDICINE

## 2024-11-13 PROCEDURE — G2211 COMPLEX E/M VISIT ADD ON: HCPCS | Performed by: INTERNAL MEDICINE

## 2024-11-13 PROCEDURE — G8417 CALC BMI ABV UP PARAM F/U: HCPCS | Performed by: INTERNAL MEDICINE

## 2024-11-13 PROCEDURE — G8482 FLU IMMUNIZE ORDER/ADMIN: HCPCS | Performed by: INTERNAL MEDICINE

## 2024-11-13 NOTE — PROGRESS NOTES
also on chronic long-term oxygen.  He denies any jaundice, melena or hematochezia.  I discussed with him that options include doing an endoscopic ultrasound to better characterize the cyst and to see if there is any solid component or need for drainage.  The cyst however is very small and will be difficult to biopsy.  If he gets any concerning signs like weight loss, abdominal pain, nausea, vomiting or jaundice then will consider EUS.  He got a repeat CT done which showed liver cirrhosis.  Normal gallbladder and extrahepatic biliary system, normal spleen normal pancreas.  No visualization of hepatic or pancreatic mass.  There was a subcentimeter area of enhancement in the posterior aspect of the mid section of the left kidney with a follow-up recommended in 6 months.    #2 liver cirrhosis  - MELD 11 based on labs on 9/16/2024. Repeat Meld labs ordered.   - Patient denies any ETOH intake   Recommend MELD labs  -Weakly positive ASMA otherwise unremarkable labs.  Negative AMA, ceruloplasmin, alpha 1 antitrypsin, iron studies  - Continue lactulose 10 mg daily to titrate to 2-3 BM. Refilled on 08/13/24   - No ascites.   - HCC screening -repeat CT in September without any liver masses.  Due to his concerning history will repeat a CT in 6 months from September 2024  Of note he does have a liver lesion which is ill-defined.  Recommend continuing to trend his AFP levels and for possible IR guided biopsy which was deferred earlier due to the lesions being small.  He got a repeat CT done which showed liver cirrhosis.  Normal gallbladder and extrahepatic biliary system, normal spleen normal pancreas.  No visualization of hepatic or pancreatic mass.  There was a subcentimeter area of enhancement in the posterior aspect of the mid section of the left kidney with a follow-up recommended in 6 months.       MELD 3.0: 11 at 9/16/2024  7:54 AM  MELD-Na: 11 at 9/16/2024  7:54 AM  Calculated from:  Serum Creatinine: 1.6 mg/dL at

## 2024-11-14 ENCOUNTER — HOSPITAL ENCOUNTER (OUTPATIENT)
Age: 84
Discharge: HOME | End: 2024-11-14
Payer: MEDICARE

## 2024-11-14 DIAGNOSIS — K74.60 CIRRHOSIS OF LIVER WITHOUT ASCITES, UNSPECIFIED HEPATIC CIRRHOSIS TYPE (HCC): ICD-10-CM

## 2024-11-14 LAB
ALBUMIN SERPL-MCNC: 4.1 G/DL (ref 3.4–5)
ALBUMIN/GLOB SERPL: 1.5 {RATIO} (ref 1.1–2.2)
ALP SERPL-CCNC: 158 U/L (ref 40–129)
ALT SERPL-CCNC: 16 U/L (ref 10–40)
AMMONIA PLAS-SCNC: 14 UMOL/L (ref 16–60)
ANION GAP SERPL CALCULATED.3IONS-SCNC: 13 MMOL/L (ref 9–17)
AST SERPL-CCNC: 20 U/L (ref 15–37)
BILIRUB SERPL-MCNC: 0.4 MG/DL (ref 0–1)
BUN SERPL-MCNC: 22 MG/DL (ref 7–20)
CALCIUM SERPL-MCNC: 9.7 MG/DL (ref 8.3–10.6)
CHLORIDE SERPL-SCNC: 102 MMOL/L (ref 99–110)
CO2 SERPL-SCNC: 26 MMOL/L (ref 21–32)
CREAT SERPL-MCNC: 1.4 MG/DL (ref 0.8–1.3)
ERYTHROCYTE [DISTWIDTH] IN BLOOD BY AUTOMATED COUNT: 14.4 % (ref 11.7–14.9)
GFR, ESTIMATED: 50 ML/MIN/1.73M2
GLUCOSE SERPL-MCNC: 203 MG/DL (ref 74–99)
HCT VFR BLD AUTO: 48.6 % (ref 42–52)
HGB BLD-MCNC: 15.9 G/DL (ref 13.5–18)
INR PPP: 1
MCH RBC QN AUTO: 32.6 PG (ref 27–31)
MCHC RBC AUTO-ENTMCNC: 32.7 G/DL (ref 32–36)
MCV RBC AUTO: 99.6 FL (ref 78–100)
PLATELET # BLD AUTO: 169 K/UL (ref 140–440)
PMV BLD AUTO: 11.1 FL (ref 7.5–11.1)
POTASSIUM SERPL-SCNC: 4.5 MMOL/L (ref 3.5–5.1)
PROT SERPL-MCNC: 6.8 G/DL (ref 6.4–8.2)
PROTHROMBIN TIME: 13.8 SEC (ref 11.7–14.5)
RBC # BLD AUTO: 4.88 M/UL (ref 4.6–6.2)
SODIUM SERPL-SCNC: 141 MMOL/L (ref 136–145)
WBC OTHER # BLD: 8.7 K/UL (ref 4–10.5)

## 2024-11-14 PROCEDURE — 85610 PROTHROMBIN TIME: CPT

## 2024-11-14 PROCEDURE — 36415 COLL VENOUS BLD VENIPUNCTURE: CPT

## 2024-11-14 PROCEDURE — 80053 COMPREHEN METABOLIC PANEL: CPT

## 2024-11-14 PROCEDURE — 82140 ASSAY OF AMMONIA: CPT

## 2024-11-14 PROCEDURE — 85027 COMPLETE CBC AUTOMATED: CPT

## 2024-11-15 RX ORDER — ROPINIROLE 0.25 MG/1
0.25 TABLET, FILM COATED ORAL DAILY
Qty: 90 TABLET | Refills: 1 | Status: SHIPPED | OUTPATIENT
Start: 2024-11-15

## 2024-11-19 ENCOUNTER — TELEPHONE (OUTPATIENT)
Dept: GASTROENTEROLOGY | Age: 84
End: 2024-11-19

## 2024-11-19 NOTE — TELEPHONE ENCOUNTER
Called pt to discuss results, he expressed understanding.       ----- Message from ORVILLE Zhang CNP sent at 11/18/2024  2:35 PM EST -----  Please call patient and inform him that his creatinine, BUN are stable but remain elevated.  His blood counts are normal  Ammonia is also fine and PT/INR is also normal.

## 2024-11-25 ENCOUNTER — HOSPITAL ENCOUNTER (OUTPATIENT)
Age: 84
Discharge: HOME | End: 2024-11-25
Payer: MEDICARE

## 2024-11-25 LAB
ALBUMIN SERPL-MCNC: 4.2 G/DL (ref 3.4–5)
ALBUMIN/GLOB SERPL: 1.5 {RATIO} (ref 1.1–2.2)
ALP SERPL-CCNC: 163 U/L (ref 40–129)
ALT SERPL-CCNC: 16 U/L (ref 10–40)
AMMONIA PLAS-SCNC: 14 UMOL/L (ref 16–60)
ANION GAP SERPL CALCULATED.3IONS-SCNC: 12 MMOL/L (ref 9–17)
AST SERPL-CCNC: 23 U/L (ref 15–37)
BILIRUB SERPL-MCNC: 0.6 MG/DL (ref 0–1)
BUN SERPL-MCNC: 24 MG/DL (ref 7–20)
CALCIUM SERPL-MCNC: 9.4 MG/DL (ref 8.3–10.6)
CHLORIDE SERPL-SCNC: 103 MMOL/L (ref 99–110)
CO2 SERPL-SCNC: 25 MMOL/L (ref 21–32)
CREAT SERPL-MCNC: 1.5 MG/DL (ref 0.8–1.3)
ERYTHROCYTE [DISTWIDTH] IN BLOOD BY AUTOMATED COUNT: 14.1 % (ref 11.7–14.9)
GFR, ESTIMATED: 43 ML/MIN/1.73M2
GLUCOSE SERPL-MCNC: 236 MG/DL (ref 74–99)
HCT VFR BLD AUTO: 48.9 % (ref 42–52)
HGB BLD-MCNC: 15.8 G/DL (ref 13.5–18)
INR PPP: 1.1
MCH RBC QN AUTO: 32.2 PG (ref 27–31)
MCHC RBC AUTO-ENTMCNC: 32.3 G/DL (ref 32–36)
MCV RBC AUTO: 99.6 FL (ref 78–100)
PLATELET # BLD AUTO: 180 K/UL (ref 140–440)
PMV BLD AUTO: 11.1 FL (ref 7.5–11.1)
POTASSIUM SERPL-SCNC: 4.6 MMOL/L (ref 3.5–5.1)
PROT SERPL-MCNC: 6.9 G/DL (ref 6.4–8.2)
PROTHROMBIN TIME: 14.6 SEC (ref 11.7–14.5)
RBC # BLD AUTO: 4.91 M/UL (ref 4.6–6.2)
SODIUM SERPL-SCNC: 141 MMOL/L (ref 136–145)
WBC OTHER # BLD: 10.7 K/UL (ref 4–10.5)

## 2024-11-25 PROCEDURE — 36415 COLL VENOUS BLD VENIPUNCTURE: CPT

## 2024-11-25 PROCEDURE — 85610 PROTHROMBIN TIME: CPT

## 2024-11-25 PROCEDURE — 85027 COMPLETE CBC AUTOMATED: CPT

## 2024-11-25 PROCEDURE — 82140 ASSAY OF AMMONIA: CPT

## 2024-11-25 PROCEDURE — 80053 COMPREHEN METABOLIC PANEL: CPT

## 2024-12-02 ENCOUNTER — OFFICE VISIT (OUTPATIENT)
Dept: INTERNAL MEDICINE CLINIC | Age: 84
End: 2024-12-02
Payer: MEDICARE

## 2024-12-02 ENCOUNTER — TELEPHONE (OUTPATIENT)
Dept: GASTROENTEROLOGY | Age: 84
End: 2024-12-02

## 2024-12-02 VITALS
WEIGHT: 202 LBS | DIASTOLIC BLOOD PRESSURE: 64 MMHG | OXYGEN SATURATION: 90 % | HEART RATE: 85 BPM | SYSTOLIC BLOOD PRESSURE: 120 MMHG | BODY MASS INDEX: 28.98 KG/M2

## 2024-12-02 DIAGNOSIS — E55.9 VITAMIN D DEFICIENCY: ICD-10-CM

## 2024-12-02 DIAGNOSIS — I15.9 HYPERTENSION, SECONDARY: ICD-10-CM

## 2024-12-02 DIAGNOSIS — I48.91 ATRIAL FIBRILLATION, UNSPECIFIED TYPE (HCC): Primary | ICD-10-CM

## 2024-12-02 DIAGNOSIS — E78.2 MIXED HYPERLIPIDEMIA: ICD-10-CM

## 2024-12-02 DIAGNOSIS — E11.69 TYPE 2 DIABETES MELLITUS WITH OTHER SPECIFIED COMPLICATION, UNSPECIFIED WHETHER LONG TERM INSULIN USE (HCC): ICD-10-CM

## 2024-12-02 DIAGNOSIS — E03.4 HYPOTHYROIDISM DUE TO ACQUIRED ATROPHY OF THYROID: ICD-10-CM

## 2024-12-02 PROCEDURE — G8417 CALC BMI ABV UP PARAM F/U: HCPCS | Performed by: FAMILY MEDICINE

## 2024-12-02 PROCEDURE — 4004F PT TOBACCO SCREEN RCVD TLK: CPT | Performed by: FAMILY MEDICINE

## 2024-12-02 PROCEDURE — 3044F HG A1C LEVEL LT 7.0%: CPT | Performed by: FAMILY MEDICINE

## 2024-12-02 PROCEDURE — 1123F ACP DISCUSS/DSCN MKR DOCD: CPT | Performed by: FAMILY MEDICINE

## 2024-12-02 PROCEDURE — G8482 FLU IMMUNIZE ORDER/ADMIN: HCPCS | Performed by: FAMILY MEDICINE

## 2024-12-02 PROCEDURE — G8428 CUR MEDS NOT DOCUMENT: HCPCS | Performed by: FAMILY MEDICINE

## 2024-12-02 PROCEDURE — G2211 COMPLEX E/M VISIT ADD ON: HCPCS | Performed by: FAMILY MEDICINE

## 2024-12-02 PROCEDURE — 99213 OFFICE O/P EST LOW 20 MIN: CPT | Performed by: FAMILY MEDICINE

## 2024-12-02 ASSESSMENT — ENCOUNTER SYMPTOMS
CHEST TIGHTNESS: 0
CONSTIPATION: 0
SORE THROAT: 0
VOMITING: 0
ABDOMINAL PAIN: 0
SHORTNESS OF BREATH: 0
DIARRHEA: 0
COLOR CHANGE: 0

## 2024-12-02 NOTE — PROGRESS NOTES
A1C; Future    4. Mixed hyperlipidemia  Lipid panel stable, continue repatha.   - Lipid Panel; Future    5. Hypertension, secondary  Stable, well controlled.  Continue current medications.   - CBC with Auto Differential; Future  - Comprehensive Metabolic Panel; Future    6. Vitamin D deficiency  Will monitor.  - Vitamin D 25 Hydroxy; Future        I have spent 25 minutes on this patient encounter.     Patient voiced understanding and agreement with plan.  All questions/concerns were addressed, risks/side effects of medications were reviewed.  Return precautions and after visit summary were provided.  Return in about 4 months (around 4/2/2025). or earlier as needed.      Ana Leslie MD

## 2024-12-02 NOTE — TELEPHONE ENCOUNTER
Pt was already notified.           ----- Message from ORVILLE Zhang CNP sent at 12/2/2024 12:25 PM EST -----  Please call the patient and inform them that their  ammonia level is normal.  PT/INR are okay  Kidney and liver function stable

## 2024-12-12 DIAGNOSIS — E03.4 HYPOTHYROIDISM DUE TO ACQUIRED ATROPHY OF THYROID: ICD-10-CM

## 2024-12-12 RX ORDER — LEVOTHYROXINE SODIUM 50 UG/1
50 TABLET ORAL DAILY
Qty: 90 TABLET | Refills: 1 | Status: SHIPPED | OUTPATIENT
Start: 2024-12-12

## 2024-12-27 RX ORDER — AMLODIPINE BESYLATE 10 MG/1
TABLET ORAL
Qty: 90 TABLET | Refills: 3 | Status: SHIPPED | OUTPATIENT
Start: 2024-12-27

## 2025-01-23 NOTE — TELEPHONE ENCOUNTER
Requested Prescriptions     Pending Prescriptions Disp Refills    gabapentin (NEURONTIN) 100 MG capsule [Pharmacy Med Name: GABAPENTIN 100 MG CAPSULE] 540 capsule 0     Sig: TAKE 2 CAPSULES BY MOUTH 3 TIMES DAILY FOR 90 DAYS. Automatic refill request sent from pharmacy. Upon review of the In Basket request we were able to locate, review, and update the patient chart as requested for DEXA Scan.    Any additional questions or concerns should be emailed to the Practice Liaisons via the appropriate education email address, please do not reply via In Basket.    Thank you  Rich Marie MA   [facility]

## 2025-02-20 RX ORDER — ROPINIROLE 0.25 MG/1
0.25 TABLET, FILM COATED ORAL DAILY
Qty: 90 TABLET | Refills: 1 | Status: SHIPPED | OUTPATIENT
Start: 2025-02-20

## 2025-03-25 RX ORDER — FAMOTIDINE 20 MG/1
20 TABLET, FILM COATED ORAL DAILY
Qty: 90 TABLET | Refills: 1 | Status: SHIPPED | OUTPATIENT
Start: 2025-03-25

## 2025-03-26 ENCOUNTER — HOSPITAL ENCOUNTER (OUTPATIENT)
Age: 85
Discharge: HOME | End: 2025-03-26
Payer: MEDICARE

## 2025-03-26 DIAGNOSIS — E55.9 VITAMIN D DEFICIENCY: ICD-10-CM

## 2025-03-26 DIAGNOSIS — I15.9 HYPERTENSION, SECONDARY: ICD-10-CM

## 2025-03-26 DIAGNOSIS — E11.69 TYPE 2 DIABETES MELLITUS WITH OTHER SPECIFIED COMPLICATION, UNSPECIFIED WHETHER LONG TERM INSULIN USE (HCC): ICD-10-CM

## 2025-03-26 DIAGNOSIS — E78.2 MIXED HYPERLIPIDEMIA: ICD-10-CM

## 2025-03-26 DIAGNOSIS — E03.4 HYPOTHYROIDISM DUE TO ACQUIRED ATROPHY OF THYROID: ICD-10-CM

## 2025-03-26 LAB
25(OH)D3 SERPL-MCNC: 103 NG/ML (ref 30–150)
ALBUMIN SERPL-MCNC: 4.2 G/DL (ref 3.4–5)
ALBUMIN/GLOB SERPL: 1.3 {RATIO} (ref 1.1–2.2)
ALP SERPL-CCNC: 158 U/L (ref 40–129)
ALT SERPL-CCNC: 21 U/L (ref 10–40)
ANION GAP SERPL CALCULATED.3IONS-SCNC: 10 MMOL/L (ref 9–17)
AST SERPL-CCNC: 25 U/L (ref 15–37)
BASOPHILS # BLD: 0.06 K/UL
BASOPHILS NFR BLD: 1 % (ref 0–1)
BILIRUB SERPL-MCNC: 0.6 MG/DL (ref 0–1)
BUN SERPL-MCNC: 21 MG/DL (ref 7–20)
CALCIUM SERPL-MCNC: 9.6 MG/DL (ref 8.3–10.6)
CHLORIDE SERPL-SCNC: 105 MMOL/L (ref 99–110)
CHOLEST SERPL-MCNC: 124 MG/DL (ref 125–199)
CO2 SERPL-SCNC: 25 MMOL/L (ref 21–32)
CREAT SERPL-MCNC: 1.2 MG/DL (ref 0.8–1.3)
EOSINOPHIL # BLD: 0.21 K/UL
EOSINOPHILS RELATIVE PERCENT: 2 % (ref 0–3)
ERYTHROCYTE [DISTWIDTH] IN BLOOD BY AUTOMATED COUNT: 14.8 % (ref 11.7–14.9)
EST. AVERAGE GLUCOSE BLD GHB EST-MCNC: 198 MG/DL
GFR, ESTIMATED: 59 ML/MIN/1.73M2
GLUCOSE SERPL-MCNC: 146 MG/DL (ref 74–99)
HBA1C MFR BLD: 8.5 % (ref 4.2–6.3)
HCT VFR BLD AUTO: 52.5 % (ref 42–52)
HDLC SERPL-MCNC: 72 MG/DL
HGB BLD-MCNC: 16.9 G/DL (ref 13.5–18)
IMM GRANULOCYTES # BLD AUTO: 0.04 K/UL
IMM GRANULOCYTES NFR BLD: 0 %
LDLC SERPL CALC-MCNC: 37 MG/DL
LYMPHOCYTES NFR BLD: 2.14 K/UL
LYMPHOCYTES RELATIVE PERCENT: 21 % (ref 24–44)
MCH RBC QN AUTO: 31.1 PG (ref 27–31)
MCHC RBC AUTO-ENTMCNC: 32.2 G/DL (ref 32–36)
MCV RBC AUTO: 96.5 FL (ref 78–100)
MONOCYTES NFR BLD: 1.09 K/UL
MONOCYTES NFR BLD: 11 % (ref 0–4)
NEUTROPHILS NFR BLD: 65 % (ref 36–66)
NEUTS SEG NFR BLD: 6.46 K/UL
PLATELET # BLD AUTO: 155 K/UL (ref 140–440)
PMV BLD AUTO: 11.4 FL (ref 7.5–11.1)
POTASSIUM SERPL-SCNC: 4.6 MMOL/L (ref 3.5–5.1)
PROT SERPL-MCNC: 7.4 G/DL (ref 6.4–8.2)
RBC # BLD AUTO: 5.44 M/UL (ref 4.6–6.2)
SODIUM SERPL-SCNC: 141 MMOL/L (ref 136–145)
TRIGL SERPL-MCNC: 71 MG/DL
TSH SERPL DL<=0.05 MIU/L-ACNC: 2.98 UIU/ML (ref 0.27–4.2)
WBC OTHER # BLD: 10 K/UL (ref 4–10.5)

## 2025-03-26 PROCEDURE — 80053 COMPREHEN METABOLIC PANEL: CPT

## 2025-03-26 PROCEDURE — 80061 LIPID PANEL: CPT

## 2025-03-26 PROCEDURE — 85025 COMPLETE CBC W/AUTO DIFF WBC: CPT

## 2025-03-26 PROCEDURE — 83036 HEMOGLOBIN GLYCOSYLATED A1C: CPT

## 2025-03-26 PROCEDURE — 84443 ASSAY THYROID STIM HORMONE: CPT

## 2025-03-26 PROCEDURE — 82306 VITAMIN D 25 HYDROXY: CPT

## 2025-04-01 ENCOUNTER — OFFICE VISIT (OUTPATIENT)
Dept: INTERNAL MEDICINE CLINIC | Age: 85
End: 2025-04-01
Payer: MEDICARE

## 2025-04-01 VITALS
HEIGHT: 70 IN | WEIGHT: 206.5 LBS | BODY MASS INDEX: 29.56 KG/M2 | HEART RATE: 60 BPM | SYSTOLIC BLOOD PRESSURE: 118 MMHG | DIASTOLIC BLOOD PRESSURE: 72 MMHG

## 2025-04-01 DIAGNOSIS — E11.69 TYPE 2 DIABETES MELLITUS WITH OTHER SPECIFIED COMPLICATION, UNSPECIFIED WHETHER LONG TERM INSULIN USE (HCC): Primary | ICD-10-CM

## 2025-04-01 DIAGNOSIS — E55.9 VITAMIN D DEFICIENCY: ICD-10-CM

## 2025-04-01 DIAGNOSIS — N18.31 STAGE 3A CHRONIC KIDNEY DISEASE (HCC): ICD-10-CM

## 2025-04-01 DIAGNOSIS — Z00.00 MEDICARE ANNUAL WELLNESS VISIT, SUBSEQUENT: ICD-10-CM

## 2025-04-01 DIAGNOSIS — E03.4 HYPOTHYROIDISM DUE TO ACQUIRED ATROPHY OF THYROID: ICD-10-CM

## 2025-04-01 LAB — HBA1C MFR BLD: 8.1 %

## 2025-04-01 PROCEDURE — 1159F MED LIST DOCD IN RCRD: CPT | Performed by: FAMILY MEDICINE

## 2025-04-01 PROCEDURE — G0439 PPPS, SUBSEQ VISIT: HCPCS | Performed by: FAMILY MEDICINE

## 2025-04-01 PROCEDURE — 1123F ACP DISCUSS/DSCN MKR DOCD: CPT | Performed by: FAMILY MEDICINE

## 2025-04-01 PROCEDURE — 3052F HG A1C>EQUAL 8.0%<EQUAL 9.0%: CPT | Performed by: FAMILY MEDICINE

## 2025-04-01 RX ORDER — METFORMIN HYDROCHLORIDE 500 MG/1
500 TABLET, EXTENDED RELEASE ORAL
Qty: 90 TABLET | Refills: 1 | Status: SHIPPED | OUTPATIENT
Start: 2025-04-01

## 2025-04-01 SDOH — ECONOMIC STABILITY: FOOD INSECURITY: WITHIN THE PAST 12 MONTHS, THE FOOD YOU BOUGHT JUST DIDN'T LAST AND YOU DIDN'T HAVE MONEY TO GET MORE.: NEVER TRUE

## 2025-04-01 SDOH — ECONOMIC STABILITY: FOOD INSECURITY: WITHIN THE PAST 12 MONTHS, YOU WORRIED THAT YOUR FOOD WOULD RUN OUT BEFORE YOU GOT MONEY TO BUY MORE.: NEVER TRUE

## 2025-04-01 ASSESSMENT — PATIENT HEALTH QUESTIONNAIRE - PHQ9
2. FEELING DOWN, DEPRESSED OR HOPELESS: NOT AT ALL
SUM OF ALL RESPONSES TO PHQ QUESTIONS 1-9: 0
1. LITTLE INTEREST OR PLEASURE IN DOING THINGS: NOT AT ALL

## 2025-04-01 NOTE — PATIENT INSTRUCTIONS
Adults.\"  Current as of: July 31, 2024  Content Version: 14.4  © 4284-5116 [employer].   Care instructions adapted under license by [employer]. If you have questions about a medical condition or this instruction, always ask your healthcare professional. [employer], [employer], disclaims any warranty or liability for your use of this information.         Learning About Vision Tests  What are vision tests?     The four most common vision tests are visual acuity tests, refraction, visual field tests, and color vision tests.  Visual acuity (sharpness) tests  These tests are used:  To see if you need glasses or contact lenses.  To monitor an eye problem.  To check an eye injury.  Visual acuity tests are done as part of routine exams. You may also have this test when you get your 's license or apply for some types of jobs.  Visual field tests  These tests are used:  To check for vision loss in any area of your range of vision.  To screen for certain eye diseases.  To look for nerve damage after a stroke, head injury, or other problem that could reduce blood flow to the brain.  Refraction and color tests  A refraction test is done to find the right prescription for glasses and contact lenses.  A color vision test is done to check for color blindness.  Color vision is often tested as part of a routine exam. You may also have this test when you apply for a job where recognizing different colors is important, such as , electronics, or the .  How are vision tests done?  Visual acuity test   You cover one eye at a time.  You read aloud from a wall chart across the room.  You read aloud from a small card that you hold in your hand.  Refraction   You look into a special device.  The device puts lenses of different strengths in front of each eye to see how strong your glasses or contact lenses need to be.  Visual field tests   Your doctor may have you look through special machines.  Or your

## 2025-04-01 NOTE — PROGRESS NOTES
Medicare Annual Wellness Visit    Devyn Kirk is here for Medicare AWV (4 month f/u.)    Assessment & Plan   1. Medicare annual wellness visit, subsequent    2. Type 2 diabetes mellitus with other specified complication, unspecified whether long term insulin use (HCC)  Uncontrolled. Rechecked HbA1c today as patient denied any significant changes in diet and HbA1c was much higher than normal for patient.  Slightly improved, but still >8 on POC check.  Will start low dose metformin and recheck labs in 3 months.  Discussed dietary modifications as well.   - POCT glycosylated hemoglobin (Hb A1C)  - metFORMIN (GLUCOPHAGE-XR) 500 MG extended release tablet; Take 1 tablet by mouth daily (with breakfast)  Dispense: 90 tablet; Refill: 1  - CBC with Auto Differential; Future  - Comprehensive Metabolic Panel; Future  - Hemoglobin A1C; Future    3. Hypothyroidism due to acquired atrophy of thyroid  Last TSH wnl.  Will monitor labs, continue current dose of synthroid.   - TSH; Future    4. Stage 3a chronic kidney disease (HCC)  Stable, continue following with nephrology.   - Comprehensive Metabolic Panel; Future    5. Vitamin D deficiency  Levels high, advised to decrease supplementation.  Will monitor.   - Vitamin D 25 Hydroxy; Future        Return in 3 months (on 7/1/2025).     Subjective   The following acute and/or chronic problems were also addressed today:    DM:  Patient denies any significant changes in diet- thinks he may have been eating more bread than usual and wife states they have been eating more more dessert but no major changes.  Has never been on medication for diabetes in the past.     No other changes since his last appointment.  Labs reviewed.      Patient's complete Health Risk Assessment and screening values have been reviewed and are found in Flowsheets. The following problems were reviewed today and where indicated follow up appointments were made and/or referrals ordered.    Positive Risk Factor

## 2025-04-11 ENCOUNTER — HOSPITAL ENCOUNTER (OUTPATIENT)
Dept: LAB | Age: 85
Discharge: HOME | End: 2025-04-11
Payer: MEDICARE

## 2025-04-11 DIAGNOSIS — N18.31 STAGE 3A CHRONIC KIDNEY DISEASE (HCC): ICD-10-CM

## 2025-04-11 LAB
ANION GAP SERPL CALCULATED.3IONS-SCNC: 10 MMOL/L (ref 9–17)
BUN SERPL-MCNC: 23 MG/DL (ref 7–20)
CALCIUM SERPL-MCNC: 9.4 MG/DL (ref 8.3–10.6)
CHLORIDE SERPL-SCNC: 103 MMOL/L (ref 99–110)
CO2 SERPL-SCNC: 26 MMOL/L (ref 21–32)
CREAT SERPL-MCNC: 1.4 MG/DL (ref 0.8–1.3)
GFR, ESTIMATED: 48 ML/MIN/1.73M2
GLUCOSE SERPL-MCNC: 149 MG/DL (ref 74–99)
POTASSIUM SERPL-SCNC: 4.7 MMOL/L (ref 3.5–5.1)
SODIUM SERPL-SCNC: 139 MMOL/L (ref 136–145)

## 2025-04-11 PROCEDURE — 80048 BASIC METABOLIC PNL TOTAL CA: CPT

## 2025-04-15 DIAGNOSIS — G25.2 RESTING TREMOR: ICD-10-CM

## 2025-04-15 RX ORDER — TRIHEXYPHENIDYL HYDROCHLORIDE 2 MG/1
TABLET ORAL
Qty: 90 TABLET | Refills: 1 | Status: SHIPPED | OUTPATIENT
Start: 2025-04-15

## 2025-04-22 ENCOUNTER — TRANSCRIBE ORDERS (OUTPATIENT)
Dept: ADMINISTRATIVE | Age: 85
End: 2025-04-22

## 2025-04-22 DIAGNOSIS — N90.1 MODERATE DYSPLASIA OF VULVA: Primary | ICD-10-CM

## 2025-04-30 ENCOUNTER — TRANSCRIBE ORDERS (OUTPATIENT)
Dept: GENERAL RADIOLOGY | Age: 85
End: 2025-04-30

## 2025-04-30 ENCOUNTER — HOSPITAL ENCOUNTER (OUTPATIENT)
Age: 85
Discharge: HOME | End: 2025-04-30
Payer: MEDICARE

## 2025-04-30 ENCOUNTER — HOSPITAL ENCOUNTER (OUTPATIENT)
Dept: GENERAL RADIOLOGY | Age: 85
Discharge: HOME | End: 2025-04-30
Payer: MEDICARE

## 2025-04-30 DIAGNOSIS — R60.0 LOCALIZED EDEMA: ICD-10-CM

## 2025-04-30 DIAGNOSIS — R06.02 SHORTNESS OF BREATH: ICD-10-CM

## 2025-04-30 DIAGNOSIS — R06.02 SHORTNESS OF BREATH: Primary | ICD-10-CM

## 2025-04-30 LAB — BNP SERPL-MCNC: 556 PG/ML (ref 0–450)

## 2025-04-30 PROCEDURE — 71046 X-RAY EXAM CHEST 2 VIEWS: CPT

## 2025-04-30 PROCEDURE — 83880 ASSAY OF NATRIURETIC PEPTIDE: CPT

## 2025-05-02 ENCOUNTER — HOSPITAL ENCOUNTER (OUTPATIENT)
Dept: CT IMAGING | Age: 85
Discharge: HOME | End: 2025-05-02
Attending: UROLOGY
Payer: MEDICARE

## 2025-05-02 DIAGNOSIS — N90.1 MODERATE DYSPLASIA OF VULVA: ICD-10-CM

## 2025-05-02 PROCEDURE — 74176 CT ABD & PELVIS W/O CONTRAST: CPT

## 2025-05-19 DIAGNOSIS — E03.4 HYPOTHYROIDISM DUE TO ACQUIRED ATROPHY OF THYROID: ICD-10-CM

## 2025-05-19 RX ORDER — LEVOTHYROXINE SODIUM 50 UG/1
50 TABLET ORAL DAILY
Qty: 90 TABLET | Refills: 1 | Status: SHIPPED | OUTPATIENT
Start: 2025-05-19

## 2025-06-11 ENCOUNTER — OFFICE VISIT (OUTPATIENT)
Dept: PULMONOLOGY | Age: 85
End: 2025-06-11
Payer: MEDICARE

## 2025-06-11 ENCOUNTER — OFFICE VISIT (OUTPATIENT)
Dept: GASTROENTEROLOGY | Age: 85
End: 2025-06-11
Payer: MEDICARE

## 2025-06-11 VITALS
HEIGHT: 70 IN | SYSTOLIC BLOOD PRESSURE: 118 MMHG | HEART RATE: 82 BPM | OXYGEN SATURATION: 92 % | BODY MASS INDEX: 29.06 KG/M2 | WEIGHT: 203 LBS | DIASTOLIC BLOOD PRESSURE: 66 MMHG | RESPIRATION RATE: 13 BRPM

## 2025-06-11 VITALS
SYSTOLIC BLOOD PRESSURE: 128 MMHG | HEART RATE: 90 BPM | OXYGEN SATURATION: 89 % | BODY MASS INDEX: 29.09 KG/M2 | WEIGHT: 203.2 LBS | HEIGHT: 70 IN | DIASTOLIC BLOOD PRESSURE: 66 MMHG

## 2025-06-11 DIAGNOSIS — J44.9 MODERATE COPD (CHRONIC OBSTRUCTIVE PULMONARY DISEASE) (HCC): ICD-10-CM

## 2025-06-11 DIAGNOSIS — R91.1 LUNG NODULE SEEN ON IMAGING STUDY: Primary | ICD-10-CM

## 2025-06-11 DIAGNOSIS — K74.60 CIRRHOSIS OF LIVER WITHOUT ASCITES, UNSPECIFIED HEPATIC CIRRHOSIS TYPE (HCC): Primary | ICD-10-CM

## 2025-06-11 DIAGNOSIS — K86.2 PANCREAS CYST: ICD-10-CM

## 2025-06-11 PROCEDURE — 1123F ACP DISCUSS/DSCN MKR DOCD: CPT | Performed by: NURSE PRACTITIONER

## 2025-06-11 PROCEDURE — 1160F RVW MEDS BY RX/DR IN RCRD: CPT | Performed by: NURSE PRACTITIONER

## 2025-06-11 PROCEDURE — 99213 OFFICE O/P EST LOW 20 MIN: CPT | Performed by: NURSE PRACTITIONER

## 2025-06-11 PROCEDURE — 3023F SPIROM DOC REV: CPT | Performed by: NURSE PRACTITIONER

## 2025-06-11 PROCEDURE — G8417 CALC BMI ABV UP PARAM F/U: HCPCS | Performed by: NURSE PRACTITIONER

## 2025-06-11 PROCEDURE — G8427 DOCREV CUR MEDS BY ELIG CLIN: HCPCS | Performed by: NURSE PRACTITIONER

## 2025-06-11 PROCEDURE — G2211 COMPLEX E/M VISIT ADD ON: HCPCS | Performed by: NURSE PRACTITIONER

## 2025-06-11 PROCEDURE — 99204 OFFICE O/P NEW MOD 45 MIN: CPT | Performed by: NURSE PRACTITIONER

## 2025-06-11 PROCEDURE — 4004F PT TOBACCO SCREEN RCVD TLK: CPT | Performed by: NURSE PRACTITIONER

## 2025-06-11 PROCEDURE — 1159F MED LIST DOCD IN RCRD: CPT | Performed by: NURSE PRACTITIONER

## 2025-06-11 RX ORDER — LACTULOSE 10 G/15ML
10 SOLUTION ORAL EVERY EVENING
Qty: 1419 ML | Refills: 4 | Status: SHIPPED | OUTPATIENT
Start: 2025-06-11

## 2025-06-11 ASSESSMENT — ENCOUNTER SYMPTOMS
ABDOMINAL PAIN: 0
NAUSEA: 0
SHORTNESS OF BREATH: 1
BLOOD IN STOOL: 0
EYE PAIN: 0
BACK PAIN: 0
COLOR CHANGE: 0
COUGH: 1
EYE DISCHARGE: 0
CONSTIPATION: 0
DIARRHEA: 0
VOMITING: 0

## 2025-06-11 NOTE — PROGRESS NOTES
Devyn Kirk (:  1940) is a 85 y.o. male,New patient, here for evaluation of the following chief complaint(s):  New Patient (Saw Dr. Edwards many years ago, thinks it may it have been for emphysema. Referred by cardiology for SOB approx 1 mo ago, SOB worse with exertion)    Subjective   SUBJECTIVE/OBJECTIVE:  Devyn Kirk is an 84 yo male who was last seen by Jones Edwards in the  while inpatient. He has been followed by [facility] since 2015. He is accompanied by his wife, Ruthy. He was referred by cardiology for a CC of worsening SOB since one month ago. He was seen by Jones Edwards for emphysema and lung nodules from . He underwent pacemaker implant in  for Atrial Fibrillation. Oxygen saturation was 89% on room air in clinic today. Devyn has multiple health co-morbidities. He is a current smoker since 1948 and expresses no desire to quit smoking.     CT lung scan from 2012 showed   1. Extensive emphysematous and fibrotic changes of the lungs   bilaterally   2. Small anterior left upper lobe pulmonary nodule has been   stable since 6/3/2010 consistent with a benign lesion. No more follow ups available.     Advanced diffuse emphysematous change with subpleural nodule in the left upper lobe measuring approximately 6 mm in greatest dimension was noted on CT lung scan from .          Review of Systems   Constitutional:  Positive for fatigue.   Respiratory:  Positive for shortness of breath.    All other systems reviewed and are negative.         Objective   Physical Exam  Vitals and nursing note reviewed. Chaperone present: RUTHY, spouse.   Constitutional:       General: He is awake.      Appearance: Normal appearance. He is well-developed and well-groomed.   HENT:      Mouth/Throat:      Mouth: Mucous membranes are moist.      Pharynx: Oropharynx is clear.   Eyes:      Extraocular Movements: Extraocular movements intact.      Conjunctiva/sclera: Conjunctivae normal.      Pupils: Pupils are

## 2025-06-11 NOTE — PROGRESS NOTES
nausea, vomiting or jaundice then will consider EUS.  He got a repeat CT done which showed liver cirrhosis.  Normal gallbladder and extrahepatic biliary system, normal spleen normal pancreas.  No visualization of hepatic or pancreatic mass.  There was a subcentimeter area of enhancement in the posterior aspect of the mid section of the left kidney with a follow-up recommended in 6 months.     2.  Liver cirrhosis  - MELD 10 based on labs on 11/25/2024. Repeat MELD labs ordered.   - Patient denies any ETOH intake   Recommend MELD labs  -Weakly positive ASMA otherwise unremarkable labs.  Negative AMA, ceruloplasmin, alpha 1 antitrypsin, iron studies  - Continue lactulose 10 mg daily to titrate to 2-3 BM.    - No ascites.   - HCC screening -repeat CT in May without any liver masses.  Due to his concerning history will repeat a CT in 6 months   Of note he does have a liver lesion which is ill-defined.  Recommend continuing to trend his AFP levels and for possible IR guided biopsy which was deferred earlier due to the lesions being small.  Instructed to continue with low Sodium diet <2 grams daily, avoid NSAID's, may take Tylenol do not exceed 2 grams daily, avoid raw seafood and increase protein intake    3.  The patient was encouraged to follow-up in 6 months.    Total time:  25 minutes.

## 2025-06-13 ENCOUNTER — HOSPITAL ENCOUNTER (OUTPATIENT)
Age: 85
Discharge: HOME | End: 2025-06-13
Payer: MEDICARE

## 2025-06-13 ENCOUNTER — HOSPITAL ENCOUNTER (OUTPATIENT)
Dept: CT IMAGING | Age: 85
Discharge: HOME | End: 2025-06-13
Payer: MEDICARE

## 2025-06-13 DIAGNOSIS — R91.1 LUNG NODULE SEEN ON IMAGING STUDY: ICD-10-CM

## 2025-06-13 LAB
ALBUMIN SERPL-MCNC: 4 G/DL (ref 3.4–5)
ALBUMIN/GLOB SERPL: 1.4 {RATIO} (ref 1.1–2.2)
ALP SERPL-CCNC: 162 U/L (ref 40–129)
ALT SERPL-CCNC: 19 U/L (ref 10–40)
ANION GAP SERPL CALCULATED.3IONS-SCNC: 12 MMOL/L (ref 9–17)
AST SERPL-CCNC: 22 U/L (ref 15–37)
BASOPHILS # BLD: 0.06 K/UL
BASOPHILS NFR BLD: 1 % (ref 0–1)
BILIRUB SERPL-MCNC: 0.5 MG/DL (ref 0–1)
BUN SERPL-MCNC: 27 MG/DL (ref 7–20)
CALCIUM SERPL-MCNC: 9.6 MG/DL (ref 8.3–10.6)
CHLORIDE SERPL-SCNC: 106 MMOL/L (ref 99–110)
CO2 SERPL-SCNC: 23 MMOL/L (ref 21–32)
CREAT SERPL-MCNC: 1.4 MG/DL (ref 0.8–1.3)
EOSINOPHIL # BLD: 0.16 K/UL
EOSINOPHILS RELATIVE PERCENT: 2 % (ref 0–3)
ERYTHROCYTE [DISTWIDTH] IN BLOOD BY AUTOMATED COUNT: 15 % (ref 11.7–14.9)
GFR, ESTIMATED: 49 ML/MIN/1.73M2
GLUCOSE SERPL-MCNC: 261 MG/DL (ref 74–99)
HCT VFR BLD AUTO: 49.6 % (ref 42–52)
HGB BLD-MCNC: 16.4 G/DL (ref 13.5–18)
IMM GRANULOCYTES # BLD AUTO: 0.03 K/UL
IMM GRANULOCYTES NFR BLD: 0 %
INR PPP: 1.2
LYMPHOCYTES NFR BLD: 1.47 K/UL
LYMPHOCYTES RELATIVE PERCENT: 19 % (ref 24–44)
MCH RBC QN AUTO: 32.3 PG (ref 27–31)
MCHC RBC AUTO-ENTMCNC: 33.1 G/DL (ref 32–36)
MCV RBC AUTO: 97.8 FL (ref 78–100)
MONOCYTES NFR BLD: 0.92 K/UL
MONOCYTES NFR BLD: 12 % (ref 0–5)
NEUTROPHILS NFR BLD: 67 % (ref 36–66)
NEUTS SEG NFR BLD: 5.25 K/UL
PLATELET # BLD AUTO: 172 K/UL (ref 140–440)
PMV BLD AUTO: 11.1 FL (ref 7.5–11.1)
POTASSIUM SERPL-SCNC: 4.6 MMOL/L (ref 3.5–5.1)
PROT SERPL-MCNC: 6.9 G/DL (ref 6.4–8.2)
PROTHROMBIN TIME: 15.1 SEC (ref 11.7–14.5)
RBC # BLD AUTO: 5.07 M/UL (ref 4.6–6.2)
SODIUM SERPL-SCNC: 141 MMOL/L (ref 136–145)
WBC OTHER # BLD: 7.9 K/UL (ref 4–10.5)

## 2025-06-13 PROCEDURE — 80053 COMPREHEN METABOLIC PANEL: CPT

## 2025-06-13 PROCEDURE — 36415 COLL VENOUS BLD VENIPUNCTURE: CPT

## 2025-06-13 PROCEDURE — 85610 PROTHROMBIN TIME: CPT

## 2025-06-13 PROCEDURE — 82105 ALPHA-FETOPROTEIN SERUM: CPT

## 2025-06-13 PROCEDURE — 71250 CT THORAX DX C-: CPT

## 2025-06-13 PROCEDURE — 85025 COMPLETE CBC W/AUTO DIFF WBC: CPT

## 2025-06-14 LAB — AFP-TM SERPL-MCNC: 2 NG/ML (ref 0–9)

## 2025-06-16 ENCOUNTER — RESULTS FOLLOW-UP (OUTPATIENT)
Dept: GASTROENTEROLOGY | Age: 85
End: 2025-06-16

## 2025-06-17 ENCOUNTER — OFFICE VISIT (OUTPATIENT)
Dept: INTERNAL MEDICINE CLINIC | Age: 85
End: 2025-06-17
Payer: MEDICARE

## 2025-06-17 ENCOUNTER — HOSPITAL ENCOUNTER (OUTPATIENT)
Age: 85
Discharge: HOME | End: 2025-06-17
Payer: MEDICARE

## 2025-06-17 VITALS
RESPIRATION RATE: 18 BRPM | WEIGHT: 201 LBS | DIASTOLIC BLOOD PRESSURE: 60 MMHG | BODY MASS INDEX: 29.26 KG/M2 | SYSTOLIC BLOOD PRESSURE: 102 MMHG

## 2025-06-17 DIAGNOSIS — E11.69 TYPE 2 DIABETES MELLITUS WITH OTHER SPECIFIED COMPLICATION, UNSPECIFIED WHETHER LONG TERM INSULIN USE (HCC): Primary | ICD-10-CM

## 2025-06-17 DIAGNOSIS — E03.4 HYPOTHYROIDISM DUE TO ACQUIRED ATROPHY OF THYROID: ICD-10-CM

## 2025-06-17 DIAGNOSIS — N18.31 STAGE 3A CHRONIC KIDNEY DISEASE (HCC): ICD-10-CM

## 2025-06-17 DIAGNOSIS — E55.9 VITAMIN D DEFICIENCY: ICD-10-CM

## 2025-06-17 DIAGNOSIS — I15.9 HYPERTENSION, SECONDARY: ICD-10-CM

## 2025-06-17 DIAGNOSIS — E78.2 MIXED HYPERLIPIDEMIA: ICD-10-CM

## 2025-06-17 LAB
25(OH)D3 SERPL-MCNC: 104 NG/ML (ref 30–150)
ALBUMIN SERPL-MCNC: 4 G/DL (ref 3.4–5)
ALBUMIN/GLOB SERPL: 1.3 {RATIO} (ref 1.1–2.2)
ALP SERPL-CCNC: 156 U/L (ref 40–129)
ALT SERPL-CCNC: 17 U/L (ref 10–40)
ANION GAP SERPL CALCULATED.3IONS-SCNC: 11 MMOL/L (ref 9–17)
AST SERPL-CCNC: 22 U/L (ref 15–37)
BASOPHILS # BLD: 0.04 K/UL
BASOPHILS NFR BLD: 1 % (ref 0–1)
BILIRUB SERPL-MCNC: 0.6 MG/DL (ref 0–1)
BUN SERPL-MCNC: 22 MG/DL (ref 7–20)
CALCIUM SERPL-MCNC: 9.2 MG/DL (ref 8.3–10.6)
CHLORIDE SERPL-SCNC: 104 MMOL/L (ref 99–110)
CHOLEST SERPL-MCNC: 132 MG/DL (ref 125–199)
CO2 SERPL-SCNC: 23 MMOL/L (ref 21–32)
CREAT SERPL-MCNC: 1.3 MG/DL (ref 0.8–1.3)
EOSINOPHIL # BLD: 0.16 K/UL
EOSINOPHILS RELATIVE PERCENT: 2 % (ref 0–3)
ERYTHROCYTE [DISTWIDTH] IN BLOOD BY AUTOMATED COUNT: 15.1 % (ref 11.7–14.9)
GFR, ESTIMATED: 55 ML/MIN/1.73M2
GLUCOSE SERPL-MCNC: 256 MG/DL (ref 74–99)
HBA1C MFR BLD: 7.7 %
HCT VFR BLD AUTO: 48.9 % (ref 42–52)
HDLC SERPL-MCNC: 54 MG/DL
HGB BLD-MCNC: 15.9 G/DL (ref 13.5–18)
IMM GRANULOCYTES # BLD AUTO: 0.04 K/UL
IMM GRANULOCYTES NFR BLD: 1 %
LDLC SERPL CALC-MCNC: 57 MG/DL
LYMPHOCYTES NFR BLD: 1.34 K/UL
LYMPHOCYTES RELATIVE PERCENT: 16 % (ref 24–44)
MCH RBC QN AUTO: 32.1 PG (ref 27–31)
MCHC RBC AUTO-ENTMCNC: 32.5 G/DL (ref 32–36)
MCV RBC AUTO: 98.8 FL (ref 78–100)
MONOCYTES NFR BLD: 0.98 K/UL
MONOCYTES NFR BLD: 12 % (ref 0–5)
NEUTROPHILS NFR BLD: 69 % (ref 36–66)
NEUTS SEG NFR BLD: 5.59 K/UL
PLATELET # BLD AUTO: 172 K/UL (ref 140–440)
PMV BLD AUTO: 11.6 FL (ref 7.5–11.1)
POTASSIUM SERPL-SCNC: 4.5 MMOL/L (ref 3.5–5.1)
PROT SERPL-MCNC: 7 G/DL (ref 6.4–8.2)
RBC # BLD AUTO: 4.95 M/UL (ref 4.6–6.2)
SODIUM SERPL-SCNC: 138 MMOL/L (ref 136–145)
TRIGL SERPL-MCNC: 109 MG/DL
TSH SERPL DL<=0.05 MIU/L-ACNC: 2.92 UIU/ML (ref 0.27–4.2)
WBC OTHER # BLD: 8.2 K/UL (ref 4–10.5)

## 2025-06-17 PROCEDURE — 4004F PT TOBACCO SCREEN RCVD TLK: CPT | Performed by: FAMILY MEDICINE

## 2025-06-17 PROCEDURE — 84443 ASSAY THYROID STIM HORMONE: CPT

## 2025-06-17 PROCEDURE — 85025 COMPLETE CBC W/AUTO DIFF WBC: CPT

## 2025-06-17 PROCEDURE — 99214 OFFICE O/P EST MOD 30 MIN: CPT | Performed by: FAMILY MEDICINE

## 2025-06-17 PROCEDURE — 3051F HG A1C>EQUAL 7.0%<8.0%: CPT | Performed by: FAMILY MEDICINE

## 2025-06-17 PROCEDURE — 1159F MED LIST DOCD IN RCRD: CPT | Performed by: FAMILY MEDICINE

## 2025-06-17 PROCEDURE — 1123F ACP DISCUSS/DSCN MKR DOCD: CPT | Performed by: FAMILY MEDICINE

## 2025-06-17 PROCEDURE — G2211 COMPLEX E/M VISIT ADD ON: HCPCS | Performed by: FAMILY MEDICINE

## 2025-06-17 PROCEDURE — G8417 CALC BMI ABV UP PARAM F/U: HCPCS | Performed by: FAMILY MEDICINE

## 2025-06-17 PROCEDURE — 83036 HEMOGLOBIN GLYCOSYLATED A1C: CPT | Performed by: FAMILY MEDICINE

## 2025-06-17 PROCEDURE — 80053 COMPREHEN METABOLIC PANEL: CPT

## 2025-06-17 PROCEDURE — G8427 DOCREV CUR MEDS BY ELIG CLIN: HCPCS | Performed by: FAMILY MEDICINE

## 2025-06-17 PROCEDURE — 82306 VITAMIN D 25 HYDROXY: CPT

## 2025-06-17 PROCEDURE — 80061 LIPID PANEL: CPT

## 2025-06-17 RX ORDER — BLOOD-GLUCOSE METER
1 KIT MISCELLANEOUS DAILY
Qty: 1 KIT | Refills: 0 | Status: SHIPPED | OUTPATIENT
Start: 2025-06-17

## 2025-06-17 RX ORDER — GLUCOSAMINE HCL/CHONDROITIN SU 500-400 MG
CAPSULE ORAL
Qty: 100 STRIP | Refills: 3 | Status: SHIPPED | OUTPATIENT
Start: 2025-06-17

## 2025-06-17 RX ORDER — AVOBENZONE, HOMOSALATE, OCTISALATE, OCTOCRYLENE 30; 40; 45; 26 MG/ML; MG/ML; MG/ML; MG/ML
1 CREAM TOPICAL DAILY
Qty: 100 EACH | Refills: 3 | Status: SHIPPED | OUTPATIENT
Start: 2025-06-17

## 2025-06-17 RX ORDER — METFORMIN HYDROCHLORIDE 500 MG/1
500 TABLET, EXTENDED RELEASE ORAL 2 TIMES DAILY WITH MEALS
Qty: 180 TABLET | Refills: 1 | Status: SHIPPED | OUTPATIENT
Start: 2025-06-17

## 2025-06-17 ASSESSMENT — ENCOUNTER SYMPTOMS
CHEST TIGHTNESS: 0
SORE THROAT: 0
SHORTNESS OF BREATH: 0
ABDOMINAL PAIN: 0
DIARRHEA: 0
CONSTIPATION: 0
COLOR CHANGE: 0
VOMITING: 0

## 2025-06-17 NOTE — PROGRESS NOTES
Cell Defense Yes Oren Lynne MD   Saw Palmetto, Serenoa repens, (SAW PALMETTO BERRY PO) Take by mouth Yes Oren Lynne MD   MAGNESIUM CHLORIDE PO Take 1 each by mouth daily  Yes Oren Lynne MD   Multiple Vitamins-Minerals (CENTRUM PO) Take 1 tablet by mouth nightly Over The Counter  Yes Oren Lynne MD   Cholecalciferol (VITAMIN D PO) Take 2,000 Units by mouth 2 times daily Over The Counter  Yes Oren Lynne MD          Objective:      /60 (BP Site: Left Upper Arm, Patient Position: Sitting, BP Cuff Size: Large Adult)   Resp 18   Wt 91.2 kg (201 lb)   BMI 29.26 kg/m²      Physical Exam  Vitals and nursing note reviewed.   Constitutional:       General: He is not in acute distress.     Appearance: Normal appearance. He is not ill-appearing or toxic-appearing.   HENT:      Head: Normocephalic and atraumatic.      Right Ear: External ear normal.      Left Ear: External ear normal.      Nose: Nose normal.      Mouth/Throat:      Pharynx: Oropharynx is clear.   Eyes:      General: No scleral icterus.        Right eye: No discharge.         Left eye: No discharge.      Extraocular Movements: Extraocular movements intact.      Conjunctiva/sclera: Conjunctivae normal.   Cardiovascular:      Rate and Rhythm: Normal rate and regular rhythm.      Heart sounds: Normal heart sounds.   Pulmonary:      Effort: Pulmonary effort is normal.      Breath sounds: Normal breath sounds. No wheezing or rales.   Musculoskeletal:         General: No deformity.      Cervical back: Normal range of motion and neck supple. No rigidity.   Skin:     General: Skin is warm and dry.      Findings: No rash.   Neurological:      General: No focal deficit present.      Mental Status: He is alert. Mental status is at baseline.      Motor: No weakness.   Psychiatric:         Mood and Affect: Mood normal.         Behavior: Behavior normal.            Assessment / Plan:      1. Type 2 diabetes mellitus

## 2025-07-10 ENCOUNTER — HOSPITAL ENCOUNTER (OUTPATIENT)
Dept: PULMONOLOGY | Age: 85
Discharge: HOME | End: 2025-07-10
Payer: MEDICARE

## 2025-07-10 DIAGNOSIS — J44.9 MODERATE COPD (CHRONIC OBSTRUCTIVE PULMONARY DISEASE) (HCC): ICD-10-CM

## 2025-07-10 LAB
DLCO %PRED: 40 %
DLCO PRED: NORMAL
DLCO/VA %PRED: NORMAL
DLCO/VA PRED: NORMAL
DLCO/VA: NORMAL
DLCO: NORMAL
EXPIRATORY TIME-POST: NORMAL
EXPIRATORY TIME: NORMAL
FEF 25-75 %CHNG: NORMAL
FEF 25-75 POST %PRED: 90 %
FEF 25-75% %PRED-PRE: 87 L/SEC
FEF 25-75% PRED: NORMAL
FEF 25-75-POST: NORMAL
FEF 25-75-PRE: NORMAL
FEV1 %PRED-POST: 93 %
FEV1 %PRED-PRE: 87 %
FEV1 PRED: NORMAL
FEV1-POST: NORMAL
FEV1-PRE: NORMAL
FEV1/FVC %PRED-POST: 81 %
FEV1/FVC %PRED-PRE: 87 %
FEV1/FVC PRED: NORMAL
FEV1/FVC-POST: NORMAL
FEV1/FVC-PRE: NORMAL
FVC %PRED-POST: 113 L
FVC %PRED-PRE: 99 %
FVC PRED: NORMAL
FVC-POST: NORMAL
FVC-PRE: NORMAL
GAW %PRED: NORMAL
GAW PRED: NORMAL
GAW: NORMAL
IC PRE %PRED: NORMAL
IC PRED: NORMAL
IC: NORMAL
MEP: NORMAL
MIP: NORMAL
MVV %PRED-PRE: NORMAL
MVV PRED: NORMAL
MVV-PRE: NORMAL
PEF %PRED-POST: NORMAL
PEF %PRED-PRE: NORMAL
PEF PRED: NORMAL
PEF%CHNG: NORMAL
PEF-POST: NORMAL
PEF-PRE: NORMAL
RAW %PRED: NORMAL
RAW PRED: NORMAL
RAW: NORMAL
RV PRE %PRED: NORMAL
RV PRED: NORMAL
RV: NORMAL
SVC %PRED: 102 %
SVC PRED: NORMAL
SVC: NORMAL
TLC PRE %PRED: 89 %
TLC PRED: NORMAL
TLC: NORMAL
VA %PRED: NORMAL
VA PRED: NORMAL
VA: NORMAL
VTG %PRED: NORMAL
VTG PRED: NORMAL
VTG: NORMAL

## 2025-07-10 PROCEDURE — 94729 DIFFUSING CAPACITY: CPT

## 2025-07-10 PROCEDURE — 94726 PLETHYSMOGRAPHY LUNG VOLUMES: CPT

## 2025-07-10 PROCEDURE — 94060 EVALUATION OF WHEEZING: CPT

## 2025-07-10 ASSESSMENT — PULMONARY FUNCTION TESTS
FVC_PERCENT_PREDICTED_PRE: 99
FEV1_PERCENT_PREDICTED_POST: 93
FEV1/FVC_PERCENT_PREDICTED_POST: 81
FEV1/FVC_PERCENT_PREDICTED_PRE: 87
FVC_PERCENT_PREDICTED_POST: 113
FEV1_PERCENT_PREDICTED_PRE: 87

## 2025-08-11 ENCOUNTER — OFFICE VISIT (OUTPATIENT)
Dept: PULMONOLOGY | Age: 85
End: 2025-08-11
Payer: MEDICARE

## 2025-08-11 VITALS
BODY MASS INDEX: 28.12 KG/M2 | OXYGEN SATURATION: 89 % | SYSTOLIC BLOOD PRESSURE: 118 MMHG | DIASTOLIC BLOOD PRESSURE: 70 MMHG | HEART RATE: 100 BPM | WEIGHT: 196.4 LBS | HEIGHT: 70 IN

## 2025-08-11 DIAGNOSIS — J44.9 MODERATE COPD (CHRONIC OBSTRUCTIVE PULMONARY DISEASE) (HCC): Primary | ICD-10-CM

## 2025-08-11 PROCEDURE — 1123F ACP DISCUSS/DSCN MKR DOCD: CPT | Performed by: NURSE PRACTITIONER

## 2025-08-11 PROCEDURE — G8427 DOCREV CUR MEDS BY ELIG CLIN: HCPCS | Performed by: NURSE PRACTITIONER

## 2025-08-11 PROCEDURE — 1159F MED LIST DOCD IN RCRD: CPT | Performed by: NURSE PRACTITIONER

## 2025-08-11 PROCEDURE — 4004F PT TOBACCO SCREEN RCVD TLK: CPT | Performed by: NURSE PRACTITIONER

## 2025-08-11 PROCEDURE — 1160F RVW MEDS BY RX/DR IN RCRD: CPT | Performed by: NURSE PRACTITIONER

## 2025-08-11 PROCEDURE — G8417 CALC BMI ABV UP PARAM F/U: HCPCS | Performed by: NURSE PRACTITIONER

## 2025-08-11 PROCEDURE — 3023F SPIROM DOC REV: CPT | Performed by: NURSE PRACTITIONER

## 2025-08-11 PROCEDURE — 99214 OFFICE O/P EST MOD 30 MIN: CPT | Performed by: NURSE PRACTITIONER

## 2025-08-12 DIAGNOSIS — G25.2 RESTING TREMOR: ICD-10-CM

## 2025-08-12 DIAGNOSIS — G25.3 MYOCLONUS: ICD-10-CM

## 2025-08-12 RX ORDER — ROPINIROLE 0.25 MG/1
0.25 TABLET, FILM COATED ORAL DAILY
Qty: 90 TABLET | Refills: 0 | Status: SHIPPED | OUTPATIENT
Start: 2025-08-12

## 2025-08-12 RX ORDER — TRIHEXYPHENIDYL HYDROCHLORIDE 2 MG/1
2 TABLET ORAL DAILY
Qty: 90 TABLET | Refills: 0 | Status: SHIPPED | OUTPATIENT
Start: 2025-08-12

## 2025-08-12 RX ORDER — FAMOTIDINE 20 MG/1
20 TABLET, FILM COATED ORAL DAILY
Qty: 90 TABLET | Refills: 0 | Status: SHIPPED | OUTPATIENT
Start: 2025-08-12

## 2025-08-18 RX ORDER — GABAPENTIN 400 MG/1
CAPSULE ORAL
Qty: 180 CAPSULE | Refills: 3 | Status: SHIPPED | OUTPATIENT
Start: 2025-08-18 | End: 2026-02-18

## 2025-08-21 PROBLEM — Z86.73 HISTORY OF STROKE: Status: ACTIVE | Noted: 2017-05-18

## 2025-09-01 DIAGNOSIS — E03.4 HYPOTHYROIDISM DUE TO ACQUIRED ATROPHY OF THYROID: ICD-10-CM

## 2025-09-02 RX ORDER — LEVOTHYROXINE SODIUM 50 UG/1
50 TABLET ORAL DAILY
Qty: 90 TABLET | Refills: 0 | Status: SHIPPED | OUTPATIENT
Start: 2025-09-02

## (undated) DEVICE — ELECTRODE ES AD CRDLSS PT RET REM POLYHESIVE

## (undated) DEVICE — SUTURE PERMAHAND SZ 2-0 L18IN NONABSORBABLE BLK L26MM SH C012D

## (undated) DEVICE — KIT WRNCH CARD W/ NO2 TORQ RATCH WHT HEX FOR CARD PACEMKR

## (undated) DEVICE — AMD ANTIMICROBIAL NON-ADHERENT PAD,0.2% POLYHEXAMETHYLENE BIGUANIDE HCI (PHMB): Brand: TELFA

## (undated) DEVICE — 3M™ IOBAN™ 2 ANTIMICROBIAL INCISE DRAPE 6650EZ: Brand: IOBAN™ 2

## (undated) DEVICE — SUTURE VCRL SZ 4-0 L27IN ABSRB VLT L26MM SH 1/2 CIR J315H

## (undated) DEVICE — SHEET,T,THYROID,STERILE: Brand: MEDLINE

## (undated) DEVICE — TOWEL,OR,DSP,ST,BLUE,STD,6/PK,12PK/CS: Brand: MEDLINE

## (undated) DEVICE — COUNTER NDL 30 COUNT FOAM STRP SGL MAG

## (undated) DEVICE — DECANTER FLD 9IN ST BG FOR ASEP TRNSF OF FLD

## (undated) DEVICE — DRAPE,UTILITY,XL,4/PK,STERILE: Brand: MEDLINE

## (undated) DEVICE — ZINACTIVE USE 2539609 APPLICATOR MEDICATED 10.5 CC SOLUTION HI LT ORNG CHLORAPREP

## (undated) DEVICE — STAPLER SKIN L39MM DIA0.53MM CRWN 5.7MM S STL FIX HD PROX

## (undated) DEVICE — DRESSING TRNSPAR W5XL4.5IN FLM SHT SEMIPERMEABLE WIND

## (undated) DEVICE — PLASMABLADE PS210-030S 3.0S LOCK: Brand: PLASMABLADE™

## (undated) DEVICE — INTENDED FOR TISSUE SEPARATION, AND OTHER PROCEDURES THAT REQUIRE A SHARP SURGICAL BLADE TO PUNCTURE OR CUT.: Brand: BARD-PARKER ® STAINLESS STEEL BLADES

## (undated) DEVICE — GAUZE,SPONGE,4"X4",16PLY,XRAY,STRL,LF: Brand: MEDLINE